# Patient Record
Sex: FEMALE | Race: WHITE | NOT HISPANIC OR LATINO
[De-identification: names, ages, dates, MRNs, and addresses within clinical notes are randomized per-mention and may not be internally consistent; named-entity substitution may affect disease eponyms.]

---

## 2017-01-25 DIAGNOSIS — M76.892 OTHER SPECIFIED ENTHESOPATHIES OF LEFT LOWER LIMB, EXCLUDING FOOT: ICD-10-CM

## 2017-01-25 DIAGNOSIS — M25.552 PAIN IN LEFT HIP: ICD-10-CM

## 2017-02-02 ENCOUNTER — FORM ENCOUNTER (OUTPATIENT)
Age: 67
End: 2017-02-02

## 2017-02-03 ENCOUNTER — APPOINTMENT (OUTPATIENT)
Dept: RADIOLOGY | Facility: CLINIC | Age: 67
End: 2017-02-03

## 2017-02-03 ENCOUNTER — OUTPATIENT (OUTPATIENT)
Dept: OUTPATIENT SERVICES | Facility: HOSPITAL | Age: 67
LOS: 1 days | End: 2017-02-03
Payer: MEDICARE

## 2017-02-03 ENCOUNTER — APPOINTMENT (OUTPATIENT)
Dept: MRI IMAGING | Facility: CLINIC | Age: 67
End: 2017-02-03

## 2017-02-03 DIAGNOSIS — N84.0 POLYP OF CORPUS UTERI: Chronic | ICD-10-CM

## 2017-02-03 DIAGNOSIS — Z96.641 PRESENCE OF RIGHT ARTIFICIAL HIP JOINT: Chronic | ICD-10-CM

## 2017-02-03 DIAGNOSIS — Z98.89 OTHER SPECIFIED POSTPROCEDURAL STATES: Chronic | ICD-10-CM

## 2017-02-03 DIAGNOSIS — Z00.8 ENCOUNTER FOR OTHER GENERAL EXAMINATION: ICD-10-CM

## 2017-02-03 PROCEDURE — 73721 MRI JNT OF LWR EXTRE W/O DYE: CPT

## 2017-04-05 ENCOUNTER — CLINICAL ADVICE (OUTPATIENT)
Age: 67
End: 2017-04-05

## 2017-04-11 ENCOUNTER — APPOINTMENT (OUTPATIENT)
Dept: ORTHOPEDIC SURGERY | Facility: CLINIC | Age: 67
End: 2017-04-11

## 2017-04-11 VITALS
DIASTOLIC BLOOD PRESSURE: 83 MMHG | BODY MASS INDEX: 25.76 KG/M2 | HEIGHT: 68 IN | WEIGHT: 170 LBS | HEART RATE: 86 BPM | SYSTOLIC BLOOD PRESSURE: 143 MMHG

## 2017-04-20 RX ADMIN — Medication 0 MG/3ML: at 00:00

## 2017-05-01 RX ORDER — HYALURONATE SOD, CROSS-LINKED 30 MG/3 ML
30 SYRINGE (ML) INTRAARTICULAR
Refills: 0 | Status: COMPLETED | OUTPATIENT
Start: 2017-04-20

## 2017-07-25 ENCOUNTER — RESULT REVIEW (OUTPATIENT)
Age: 67
End: 2017-07-25

## 2017-11-07 ENCOUNTER — APPOINTMENT (OUTPATIENT)
Dept: ORTHOPEDIC SURGERY | Facility: CLINIC | Age: 67
End: 2017-11-07
Payer: MEDICARE

## 2017-11-07 VITALS
DIASTOLIC BLOOD PRESSURE: 84 MMHG | SYSTOLIC BLOOD PRESSURE: 130 MMHG | HEIGHT: 68 IN | WEIGHT: 173.8 LBS | BODY MASS INDEX: 26.34 KG/M2 | HEART RATE: 85 BPM

## 2017-11-07 DIAGNOSIS — T84.84XA PAIN DUE TO INTERNAL ORTHOPEDIC PROSTHETIC DEVICES, IMPLANTS AND GRAFTS, INITIAL ENCOUNTER: ICD-10-CM

## 2017-11-07 DIAGNOSIS — Z96.649 PAIN DUE TO INTERNAL ORTHOPEDIC PROSTHETIC DEVICES, IMPLANTS AND GRAFTS, INITIAL ENCOUNTER: ICD-10-CM

## 2017-11-07 PROCEDURE — 20610 DRAIN/INJ JOINT/BURSA W/O US: CPT | Mod: RT

## 2017-11-07 PROCEDURE — 73502 X-RAY EXAM HIP UNI 2-3 VIEWS: CPT

## 2017-11-07 PROCEDURE — 99213 OFFICE O/P EST LOW 20 MIN: CPT | Mod: 25

## 2017-11-07 PROCEDURE — 73562 X-RAY EXAM OF KNEE 3: CPT | Mod: RT

## 2018-01-17 ENCOUNTER — TRANSCRIPTION ENCOUNTER (OUTPATIENT)
Age: 68
End: 2018-01-17

## 2018-01-20 ENCOUNTER — APPOINTMENT (OUTPATIENT)
Dept: ORTHOPEDIC SURGERY | Facility: CLINIC | Age: 68
End: 2018-01-20
Payer: MEDICARE

## 2018-01-20 VITALS
DIASTOLIC BLOOD PRESSURE: 84 MMHG | HEART RATE: 75 BPM | WEIGHT: 173 LBS | SYSTOLIC BLOOD PRESSURE: 137 MMHG | HEIGHT: 68 IN | BODY MASS INDEX: 26.22 KG/M2

## 2018-01-20 PROCEDURE — 20610 DRAIN/INJ JOINT/BURSA W/O US: CPT | Mod: RT

## 2018-01-20 PROCEDURE — 99214 OFFICE O/P EST MOD 30 MIN: CPT | Mod: 25

## 2018-01-20 RX ADMIN — Medication 0 MG/3ML: at 00:00

## 2018-01-26 RX ORDER — HYALURONATE SOD, CROSS-LINKED 30 MG/3 ML
30 SYRINGE (ML) INTRAARTICULAR
Qty: 0 | Refills: 0 | Status: COMPLETED | OUTPATIENT
Start: 2018-01-20

## 2018-07-20 ENCOUNTER — APPOINTMENT (OUTPATIENT)
Dept: ORTHOPEDIC SURGERY | Facility: CLINIC | Age: 68
End: 2018-07-20
Payer: MEDICARE

## 2018-07-20 VITALS
SYSTOLIC BLOOD PRESSURE: 121 MMHG | HEIGHT: 68 IN | DIASTOLIC BLOOD PRESSURE: 76 MMHG | BODY MASS INDEX: 26.22 KG/M2 | HEART RATE: 71 BPM | WEIGHT: 173 LBS

## 2018-07-20 PROCEDURE — 20610 DRAIN/INJ JOINT/BURSA W/O US: CPT | Mod: 50

## 2018-07-20 PROCEDURE — 99214 OFFICE O/P EST MOD 30 MIN: CPT | Mod: 25

## 2018-07-26 ENCOUNTER — RESULT REVIEW (OUTPATIENT)
Age: 68
End: 2018-07-26

## 2018-11-27 ENCOUNTER — APPOINTMENT (OUTPATIENT)
Dept: ORTHOPEDIC SURGERY | Facility: CLINIC | Age: 68
End: 2018-11-27
Payer: MEDICARE

## 2018-11-27 VITALS
HEART RATE: 91 BPM | DIASTOLIC BLOOD PRESSURE: 89 MMHG | BODY MASS INDEX: 23.57 KG/M2 | WEIGHT: 155 LBS | SYSTOLIC BLOOD PRESSURE: 142 MMHG

## 2018-11-27 PROCEDURE — 99213 OFFICE O/P EST LOW 20 MIN: CPT

## 2018-11-27 PROCEDURE — 73502 X-RAY EXAM HIP UNI 2-3 VIEWS: CPT

## 2019-01-31 ENCOUNTER — APPOINTMENT (OUTPATIENT)
Dept: ORTHOPEDIC SURGERY | Facility: CLINIC | Age: 69
End: 2019-01-31

## 2019-05-07 ENCOUNTER — APPOINTMENT (OUTPATIENT)
Dept: ORTHOPEDIC SURGERY | Facility: CLINIC | Age: 69
End: 2019-05-07
Payer: MEDICARE

## 2019-05-07 VITALS — DIASTOLIC BLOOD PRESSURE: 84 MMHG | HEART RATE: 79 BPM | SYSTOLIC BLOOD PRESSURE: 125 MMHG

## 2019-05-07 PROCEDURE — 99213 OFFICE O/P EST LOW 20 MIN: CPT

## 2019-05-07 PROCEDURE — 73502 X-RAY EXAM HIP UNI 2-3 VIEWS: CPT

## 2019-05-07 PROCEDURE — 73562 X-RAY EXAM OF KNEE 3: CPT | Mod: LT

## 2019-05-08 ENCOUNTER — FORM ENCOUNTER (OUTPATIENT)
Age: 69
End: 2019-05-08

## 2019-05-08 LAB
COBALT: NORMAL UG/L
NICKEL: NORMAL UG/L

## 2019-05-09 ENCOUNTER — OUTPATIENT (OUTPATIENT)
Dept: OUTPATIENT SERVICES | Facility: HOSPITAL | Age: 69
LOS: 1 days | End: 2019-05-09
Payer: MEDICARE

## 2019-05-09 ENCOUNTER — APPOINTMENT (OUTPATIENT)
Dept: MRI IMAGING | Facility: CLINIC | Age: 69
End: 2019-05-09
Payer: MEDICARE

## 2019-05-09 DIAGNOSIS — Z98.89 OTHER SPECIFIED POSTPROCEDURAL STATES: Chronic | ICD-10-CM

## 2019-05-09 DIAGNOSIS — Z96.641 PRESENCE OF RIGHT ARTIFICIAL HIP JOINT: Chronic | ICD-10-CM

## 2019-05-09 DIAGNOSIS — Z00.8 ENCOUNTER FOR OTHER GENERAL EXAMINATION: ICD-10-CM

## 2019-05-09 DIAGNOSIS — N84.0 POLYP OF CORPUS UTERI: Chronic | ICD-10-CM

## 2019-05-09 LAB — CR BLD-MCNC: 1.1 UG/L

## 2019-05-09 PROCEDURE — 73721 MRI JNT OF LWR EXTRE W/O DYE: CPT | Mod: 26,RT

## 2019-05-09 PROCEDURE — 73721 MRI JNT OF LWR EXTRE W/O DYE: CPT

## 2019-05-14 ENCOUNTER — FORM ENCOUNTER (OUTPATIENT)
Age: 69
End: 2019-05-14

## 2019-05-14 ENCOUNTER — APPOINTMENT (OUTPATIENT)
Dept: ORTHOPEDIC SURGERY | Facility: CLINIC | Age: 69
End: 2019-05-14
Payer: MEDICARE

## 2019-05-14 VITALS — DIASTOLIC BLOOD PRESSURE: 81 MMHG | SYSTOLIC BLOOD PRESSURE: 126 MMHG | HEART RATE: 72 BPM

## 2019-05-14 DIAGNOSIS — M70.61 TROCHANTERIC BURSITIS, RIGHT HIP: ICD-10-CM

## 2019-05-14 PROCEDURE — 99213 OFFICE O/P EST LOW 20 MIN: CPT

## 2019-05-15 ENCOUNTER — APPOINTMENT (OUTPATIENT)
Dept: ULTRASOUND IMAGING | Facility: CLINIC | Age: 69
End: 2019-05-15
Payer: MEDICARE

## 2019-05-15 ENCOUNTER — OUTPATIENT (OUTPATIENT)
Dept: OUTPATIENT SERVICES | Facility: HOSPITAL | Age: 69
LOS: 1 days | End: 2019-05-15
Payer: MEDICARE

## 2019-05-15 DIAGNOSIS — Z98.89 OTHER SPECIFIED POSTPROCEDURAL STATES: Chronic | ICD-10-CM

## 2019-05-15 DIAGNOSIS — N84.0 POLYP OF CORPUS UTERI: Chronic | ICD-10-CM

## 2019-05-15 DIAGNOSIS — Z96.641 PRESENCE OF RIGHT ARTIFICIAL HIP JOINT: Chronic | ICD-10-CM

## 2019-05-15 DIAGNOSIS — Z00.8 ENCOUNTER FOR OTHER GENERAL EXAMINATION: ICD-10-CM

## 2019-05-15 PROCEDURE — 20611 DRAIN/INJ JOINT/BURSA W/US: CPT

## 2019-05-15 PROCEDURE — 20611 DRAIN/INJ JOINT/BURSA W/US: CPT | Mod: RT

## 2019-05-21 ENCOUNTER — APPOINTMENT (OUTPATIENT)
Dept: ORTHOPEDIC SURGERY | Facility: CLINIC | Age: 69
End: 2019-05-21

## 2019-06-12 ENCOUNTER — APPOINTMENT (OUTPATIENT)
Dept: ORTHOPEDIC SURGERY | Facility: CLINIC | Age: 69
End: 2019-06-12

## 2019-06-30 ENCOUNTER — INPATIENT (INPATIENT)
Facility: HOSPITAL | Age: 69
LOS: 4 days | Discharge: HOME | DRG: 392 | End: 2019-07-05
Attending: INTERNAL MEDICINE | Admitting: INTERNAL MEDICINE
Payer: MEDICARE

## 2019-06-30 VITALS
SYSTOLIC BLOOD PRESSURE: 131 MMHG | WEIGHT: 160.06 LBS | RESPIRATION RATE: 20 BRPM | TEMPERATURE: 98 F | HEIGHT: 68 IN | HEART RATE: 91 BPM | OXYGEN SATURATION: 99 % | DIASTOLIC BLOOD PRESSURE: 71 MMHG

## 2019-06-30 DIAGNOSIS — Z98.89 OTHER SPECIFIED POSTPROCEDURAL STATES: Chronic | ICD-10-CM

## 2019-06-30 DIAGNOSIS — Z96.641 PRESENCE OF RIGHT ARTIFICIAL HIP JOINT: Chronic | ICD-10-CM

## 2019-06-30 DIAGNOSIS — N84.0 POLYP OF CORPUS UTERI: Chronic | ICD-10-CM

## 2019-06-30 LAB
ALBUMIN SERPL ELPH-MCNC: 2.7 G/DL — LOW (ref 3.3–5)
ALP SERPL-CCNC: 60 U/L — SIGNIFICANT CHANGE UP (ref 40–120)
ALT FLD-CCNC: 35 U/L — SIGNIFICANT CHANGE UP (ref 10–45)
ANION GAP SERPL CALC-SCNC: 15 MMOL/L — SIGNIFICANT CHANGE UP (ref 5–17)
APTT BLD: 20.1 SEC — LOW (ref 27.5–36.3)
AST SERPL-CCNC: 54 U/L — HIGH (ref 10–40)
BASE EXCESS BLDV CALC-SCNC: 3.8 MMOL/L — HIGH (ref -2–2)
BASOPHILS # BLD AUTO: 0 K/UL — SIGNIFICANT CHANGE UP (ref 0–0.2)
BASOPHILS NFR BLD AUTO: 0.1 % — SIGNIFICANT CHANGE UP (ref 0–2)
BILIRUB SERPL-MCNC: 0.6 MG/DL — SIGNIFICANT CHANGE UP (ref 0.2–1.2)
BUN SERPL-MCNC: 12 MG/DL — SIGNIFICANT CHANGE UP (ref 7–23)
CA-I SERPL-SCNC: 1.15 MMOL/L — SIGNIFICANT CHANGE UP (ref 1.12–1.3)
CALCIUM SERPL-MCNC: 9.1 MG/DL — SIGNIFICANT CHANGE UP (ref 8.4–10.5)
CHLORIDE BLDV-SCNC: 101 MMOL/L — SIGNIFICANT CHANGE UP (ref 96–108)
CHLORIDE SERPL-SCNC: 92 MMOL/L — LOW (ref 96–108)
CO2 BLDV-SCNC: 30 MMOL/L — SIGNIFICANT CHANGE UP (ref 22–30)
CO2 SERPL-SCNC: 23 MMOL/L — SIGNIFICANT CHANGE UP (ref 22–31)
CREAT SERPL-MCNC: 0.54 MG/DL — SIGNIFICANT CHANGE UP (ref 0.5–1.3)
EOSINOPHIL # BLD AUTO: 0.4 K/UL — SIGNIFICANT CHANGE UP (ref 0–0.5)
EOSINOPHIL NFR BLD AUTO: 3.6 % — SIGNIFICANT CHANGE UP (ref 0–6)
GAS PNL BLDV: 132 MMOL/L — LOW (ref 135–145)
GAS PNL BLDV: SIGNIFICANT CHANGE UP
GLUCOSE BLDV-MCNC: 127 MG/DL — HIGH (ref 70–99)
GLUCOSE SERPL-MCNC: 129 MG/DL — HIGH (ref 70–99)
HCO3 BLDV-SCNC: 28 MMOL/L — SIGNIFICANT CHANGE UP (ref 21–29)
HCT VFR BLD CALC: 40.4 % — SIGNIFICANT CHANGE UP (ref 34.5–45)
HCT VFR BLDA CALC: 41 % — SIGNIFICANT CHANGE UP (ref 39–50)
HGB BLD CALC-MCNC: 13.5 G/DL — SIGNIFICANT CHANGE UP (ref 11.5–15.5)
HGB BLD-MCNC: 13.8 G/DL — SIGNIFICANT CHANGE UP (ref 11.5–15.5)
INR BLD: 1.19 RATIO — HIGH (ref 0.88–1.16)
LACTATE BLDV-MCNC: 1.4 MMOL/L — SIGNIFICANT CHANGE UP (ref 0.7–2)
LIDOCAIN IGE QN: 17 U/L — SIGNIFICANT CHANGE UP (ref 7–60)
LYMPHOCYTES # BLD AUTO: 0.6 K/UL — LOW (ref 1–3.3)
LYMPHOCYTES # BLD AUTO: 5.9 % — LOW (ref 13–44)
MAGNESIUM SERPL-MCNC: 2 MG/DL — SIGNIFICANT CHANGE UP (ref 1.6–2.6)
MCHC RBC-ENTMCNC: 33.3 PG — SIGNIFICANT CHANGE UP (ref 27–34)
MCHC RBC-ENTMCNC: 34.1 GM/DL — SIGNIFICANT CHANGE UP (ref 32–36)
MCV RBC AUTO: 97.6 FL — SIGNIFICANT CHANGE UP (ref 80–100)
MONOCYTES # BLD AUTO: 0.9 K/UL — SIGNIFICANT CHANGE UP (ref 0–0.9)
MONOCYTES NFR BLD AUTO: 8.6 % — SIGNIFICANT CHANGE UP (ref 2–14)
NEUTROPHILS # BLD AUTO: 8.8 K/UL — HIGH (ref 1.8–7.4)
NEUTROPHILS NFR BLD AUTO: 81.9 % — HIGH (ref 43–77)
PCO2 BLDV: 44 MMHG — SIGNIFICANT CHANGE UP (ref 35–50)
PH BLDV: 7.42 — SIGNIFICANT CHANGE UP (ref 7.35–7.45)
PLATELET # BLD AUTO: 294 K/UL — SIGNIFICANT CHANGE UP (ref 150–400)
PO2 BLDV: 41 MMHG — SIGNIFICANT CHANGE UP (ref 25–45)
POTASSIUM BLDV-SCNC: 3.4 MMOL/L — LOW (ref 3.5–5.3)
POTASSIUM SERPL-MCNC: 5.9 MMOL/L — HIGH (ref 3.5–5.3)
POTASSIUM SERPL-SCNC: 5.9 MMOL/L — HIGH (ref 3.5–5.3)
PROT SERPL-MCNC: 7.8 G/DL — SIGNIFICANT CHANGE UP (ref 6–8.3)
PROTHROM AB SERPL-ACNC: 13.6 SEC — HIGH (ref 10–12.9)
RBC # BLD: 4.13 M/UL — SIGNIFICANT CHANGE UP (ref 3.8–5.2)
RBC # FLD: 11.7 % — SIGNIFICANT CHANGE UP (ref 10.3–14.5)
SAO2 % BLDV: 70 % — SIGNIFICANT CHANGE UP (ref 67–88)
SODIUM SERPL-SCNC: 130 MMOL/L — LOW (ref 135–145)
WBC # BLD: 10.7 K/UL — HIGH (ref 3.8–10.5)
WBC # FLD AUTO: 10.7 K/UL — HIGH (ref 3.8–10.5)

## 2019-06-30 PROCEDURE — 74177 CT ABD & PELVIS W/CONTRAST: CPT | Mod: 26

## 2019-06-30 PROCEDURE — 99284 EMERGENCY DEPT VISIT MOD MDM: CPT | Mod: GC

## 2019-06-30 RX ORDER — ONDANSETRON 8 MG/1
4 TABLET, FILM COATED ORAL ONCE
Refills: 0 | Status: COMPLETED | OUTPATIENT
Start: 2019-06-30 | End: 2019-06-30

## 2019-06-30 RX ORDER — ACETAMINOPHEN 500 MG
650 TABLET ORAL EVERY 6 HOURS
Refills: 0 | Status: DISCONTINUED | OUTPATIENT
Start: 2019-06-30 | End: 2019-06-30

## 2019-06-30 RX ORDER — SODIUM CHLORIDE 9 MG/ML
1000 INJECTION, SOLUTION INTRAVENOUS ONCE
Refills: 0 | Status: COMPLETED | OUTPATIENT
Start: 2019-06-30 | End: 2019-06-30

## 2019-06-30 RX ADMIN — ONDANSETRON 4 MILLIGRAM(S): 8 TABLET, FILM COATED ORAL at 22:45

## 2019-06-30 RX ADMIN — SODIUM CHLORIDE 1000 MILLILITER(S): 9 INJECTION, SOLUTION INTRAVENOUS at 22:37

## 2019-06-30 RX ADMIN — Medication 650 MILLIGRAM(S): at 22:45

## 2019-06-30 NOTE — ED ADULT NURSE NOTE - ED STAT RN HANDOFF DETAILS 2
Report received from Alis Cerrato RN. Pt TBA med. No bed yet. A&Ox4. IV intact x 2 RACF and LACF without sx of infilt. Heparin infusing well via pump. Color pink. skin W&D. lungs clear. Abd distended TTP upper abd bilat. c/o upper bilat abd pain 4/10. voiding well 0700Nursing Reassesment: Report received from Alis Cerrato RN. Pt TBA med. No bed yet. A&Ox4. IV intact x 2 RACF and LACF without sx of infilt. Heparin infusing well via pump. Color pink. skin W&D. lungs clear. Abd distended TTP upper abd bilat. c/o upper bilat abd pain 4/10. voiding well.  0840 c/o increasing abd pain, lower back pain and nausea. PA Ladece notified. 0910 Pt medicated for pain and nausea.  0915 PMD Dr León salgado pt.  0920 Awaiting transport to [address] [place] [address]. Feeling a little better.

## 2019-06-30 NOTE — ED ADULT NURSE NOTE - OBJECTIVE STATEMENT
68y female with hx of HLD, factor V and OA presents to the ER c/o abdominal pain x2 days. pt is alert and oriented x 4 and speaking coherently. pt states that last monday she began to have fevers, chills, nausea pt called her PCP who wanted her to have an abdominal CT. Pt states she was supposed to go to the Dr tomorrow for the CT but the pain and abdominal swelling worsened tonight. Pt denies cp, sob. pt abdomen is distended. pt passing gas and BM. Pt c/o generalized abdominal pain. MD salgado completed. will reassess.

## 2019-07-01 DIAGNOSIS — R10.9 UNSPECIFIED ABDOMINAL PAIN: ICD-10-CM

## 2019-07-01 LAB
ALBUMIN SERPL ELPH-MCNC: 2.4 G/DL — LOW (ref 3.3–5)
ALBUMIN SERPL ELPH-MCNC: 2.5 G/DL — LOW (ref 3.3–5)
ALP SERPL-CCNC: 47 U/L — SIGNIFICANT CHANGE UP (ref 40–120)
ALP SERPL-CCNC: 49 U/L — SIGNIFICANT CHANGE UP (ref 40–120)
ALT FLD-CCNC: 19 U/L — SIGNIFICANT CHANGE UP (ref 10–45)
ALT FLD-CCNC: 28 U/L — SIGNIFICANT CHANGE UP (ref 10–45)
ANION GAP SERPL CALC-SCNC: 10 MMOL/L — SIGNIFICANT CHANGE UP (ref 5–17)
ANION GAP SERPL CALC-SCNC: 14 MMOL/L — SIGNIFICANT CHANGE UP (ref 5–17)
APPEARANCE UR: CLEAR — SIGNIFICANT CHANGE UP
APPEARANCE UR: CLEAR — SIGNIFICANT CHANGE UP
APTT BLD: 40.6 SEC — HIGH (ref 27.5–36.3)
APTT BLD: 54.9 SEC — HIGH (ref 27.5–36.3)
AST SERPL-CCNC: 19 U/L — SIGNIFICANT CHANGE UP (ref 10–40)
AST SERPL-CCNC: 29 U/L — SIGNIFICANT CHANGE UP (ref 10–40)
BACTERIA # UR AUTO: NEGATIVE — SIGNIFICANT CHANGE UP
BILIRUB SERPL-MCNC: 0.3 MG/DL — SIGNIFICANT CHANGE UP (ref 0.2–1.2)
BILIRUB SERPL-MCNC: 0.4 MG/DL — SIGNIFICANT CHANGE UP (ref 0.2–1.2)
BILIRUB UR-MCNC: NEGATIVE — SIGNIFICANT CHANGE UP
BILIRUB UR-MCNC: NEGATIVE — SIGNIFICANT CHANGE UP
BLD GP AB SCN SERPL QL: NEGATIVE — SIGNIFICANT CHANGE UP
BUN SERPL-MCNC: 11 MG/DL — SIGNIFICANT CHANGE UP (ref 7–23)
BUN SERPL-MCNC: 8 MG/DL — SIGNIFICANT CHANGE UP (ref 7–23)
CALCIUM SERPL-MCNC: 8.6 MG/DL — SIGNIFICANT CHANGE UP (ref 8.4–10.5)
CALCIUM SERPL-MCNC: 8.8 MG/DL — SIGNIFICANT CHANGE UP (ref 8.4–10.5)
CHLORIDE SERPL-SCNC: 101 MMOL/L — SIGNIFICANT CHANGE UP (ref 96–108)
CHLORIDE SERPL-SCNC: 93 MMOL/L — LOW (ref 96–108)
CO2 SERPL-SCNC: 24 MMOL/L — SIGNIFICANT CHANGE UP (ref 22–31)
CO2 SERPL-SCNC: 25 MMOL/L — SIGNIFICANT CHANGE UP (ref 22–31)
COLOR SPEC: SIGNIFICANT CHANGE UP
COLOR SPEC: SIGNIFICANT CHANGE UP
CREAT SERPL-MCNC: 0.64 MG/DL — SIGNIFICANT CHANGE UP (ref 0.5–1.3)
CREAT SERPL-MCNC: 0.67 MG/DL — SIGNIFICANT CHANGE UP (ref 0.5–1.3)
DIFF PNL FLD: NEGATIVE — SIGNIFICANT CHANGE UP
DIFF PNL FLD: NEGATIVE — SIGNIFICANT CHANGE UP
EPI CELLS # UR: 1 /HPF — SIGNIFICANT CHANGE UP
GLUCOSE SERPL-MCNC: 136 MG/DL — HIGH (ref 70–99)
GLUCOSE SERPL-MCNC: 147 MG/DL — HIGH (ref 70–99)
GLUCOSE UR QL: NEGATIVE — SIGNIFICANT CHANGE UP
GLUCOSE UR QL: NEGATIVE — SIGNIFICANT CHANGE UP
HCT VFR BLD CALC: 34.2 % — LOW (ref 34.5–45)
HCT VFR BLD CALC: 38.2 % — SIGNIFICANT CHANGE UP (ref 34.5–45)
HGB BLD-MCNC: 11.6 G/DL — SIGNIFICANT CHANGE UP (ref 11.5–15.5)
HGB BLD-MCNC: 12.6 G/DL — SIGNIFICANT CHANGE UP (ref 11.5–15.5)
HYALINE CASTS # UR AUTO: 0 /LPF — SIGNIFICANT CHANGE UP (ref 0–2)
KETONES UR-MCNC: ABNORMAL
KETONES UR-MCNC: ABNORMAL
LACTATE SERPL-SCNC: 2.1 MMOL/L — HIGH (ref 0.7–2)
LEUKOCYTE ESTERASE UR-ACNC: NEGATIVE — SIGNIFICANT CHANGE UP
LEUKOCYTE ESTERASE UR-ACNC: NEGATIVE — SIGNIFICANT CHANGE UP
MCHC RBC-ENTMCNC: 32.5 PG — SIGNIFICANT CHANGE UP (ref 27–34)
MCHC RBC-ENTMCNC: 33 PG — SIGNIFICANT CHANGE UP (ref 27–34)
MCHC RBC-ENTMCNC: 33.1 GM/DL — SIGNIFICANT CHANGE UP (ref 32–36)
MCHC RBC-ENTMCNC: 34 GM/DL — SIGNIFICANT CHANGE UP (ref 32–36)
MCV RBC AUTO: 97.1 FL — SIGNIFICANT CHANGE UP (ref 80–100)
MCV RBC AUTO: 98.1 FL — SIGNIFICANT CHANGE UP (ref 80–100)
NITRITE UR-MCNC: NEGATIVE — SIGNIFICANT CHANGE UP
NITRITE UR-MCNC: NEGATIVE — SIGNIFICANT CHANGE UP
PH UR: 6.5 — SIGNIFICANT CHANGE UP (ref 5–8)
PH UR: 6.5 — SIGNIFICANT CHANGE UP (ref 5–8)
PLATELET # BLD AUTO: 282 K/UL — SIGNIFICANT CHANGE UP (ref 150–400)
PLATELET # BLD AUTO: 327 K/UL — SIGNIFICANT CHANGE UP (ref 150–400)
POTASSIUM SERPL-MCNC: 3.6 MMOL/L — SIGNIFICANT CHANGE UP (ref 3.5–5.3)
POTASSIUM SERPL-MCNC: 4.1 MMOL/L — SIGNIFICANT CHANGE UP (ref 3.5–5.3)
POTASSIUM SERPL-SCNC: 3.6 MMOL/L — SIGNIFICANT CHANGE UP (ref 3.5–5.3)
POTASSIUM SERPL-SCNC: 4.1 MMOL/L — SIGNIFICANT CHANGE UP (ref 3.5–5.3)
PROT SERPL-MCNC: 5.8 G/DL — LOW (ref 6–8.3)
PROT SERPL-MCNC: 6.1 G/DL — SIGNIFICANT CHANGE UP (ref 6–8.3)
PROT UR-MCNC: SIGNIFICANT CHANGE UP
PROT UR-MCNC: SIGNIFICANT CHANGE UP
RBC # BLD: 3.52 M/UL — LOW (ref 3.8–5.2)
RBC # BLD: 3.89 M/UL — SIGNIFICANT CHANGE UP (ref 3.8–5.2)
RBC # FLD: 11.4 % — SIGNIFICANT CHANGE UP (ref 10.3–14.5)
RBC # FLD: 11.7 % — SIGNIFICANT CHANGE UP (ref 10.3–14.5)
RBC CASTS # UR COMP ASSIST: 1 /HPF — SIGNIFICANT CHANGE UP (ref 0–4)
RH IG SCN BLD-IMP: POSITIVE — SIGNIFICANT CHANGE UP
SODIUM SERPL-SCNC: 131 MMOL/L — LOW (ref 135–145)
SODIUM SERPL-SCNC: 136 MMOL/L — SIGNIFICANT CHANGE UP (ref 135–145)
SP GR SPEC: 1.02 — SIGNIFICANT CHANGE UP (ref 1.01–1.02)
SP GR SPEC: 1.04 — HIGH (ref 1.01–1.02)
UROBILINOGEN FLD QL: NEGATIVE — SIGNIFICANT CHANGE UP
UROBILINOGEN FLD QL: SIGNIFICANT CHANGE UP
WBC # BLD: 10 K/UL — SIGNIFICANT CHANGE UP (ref 3.8–10.5)
WBC # BLD: 9.7 K/UL — SIGNIFICANT CHANGE UP (ref 3.8–10.5)
WBC # FLD AUTO: 10 K/UL — SIGNIFICANT CHANGE UP (ref 3.8–10.5)
WBC # FLD AUTO: 9.7 K/UL — SIGNIFICANT CHANGE UP (ref 3.8–10.5)
WBC UR QL: 2 /HPF — SIGNIFICANT CHANGE UP (ref 0–5)

## 2019-07-01 PROCEDURE — 99284 EMERGENCY DEPT VISIT MOD MDM: CPT

## 2019-07-01 PROCEDURE — 76705 ECHO EXAM OF ABDOMEN: CPT | Mod: 26

## 2019-07-01 PROCEDURE — 74019 RADEX ABDOMEN 2 VIEWS: CPT | Mod: 26

## 2019-07-01 RX ORDER — ACETAMINOPHEN 500 MG
650 TABLET ORAL ONCE
Refills: 0 | Status: COMPLETED | OUTPATIENT
Start: 2019-07-01 | End: 2019-07-01

## 2019-07-01 RX ORDER — ONDANSETRON 8 MG/1
4 TABLET, FILM COATED ORAL EVERY 6 HOURS
Refills: 0 | Status: DISCONTINUED | OUTPATIENT
Start: 2019-07-01 | End: 2019-07-05

## 2019-07-01 RX ORDER — ATORVASTATIN CALCIUM 80 MG/1
10 TABLET, FILM COATED ORAL AT BEDTIME
Refills: 0 | Status: DISCONTINUED | OUTPATIENT
Start: 2019-07-01 | End: 2019-07-05

## 2019-07-01 RX ORDER — METRONIDAZOLE 500 MG
500 TABLET ORAL EVERY 8 HOURS
Refills: 0 | Status: DISCONTINUED | OUTPATIENT
Start: 2019-07-01 | End: 2019-07-05

## 2019-07-01 RX ORDER — CEFTRIAXONE 500 MG/1
1000 INJECTION, POWDER, FOR SOLUTION INTRAMUSCULAR; INTRAVENOUS EVERY 24 HOURS
Refills: 0 | Status: DISCONTINUED | OUTPATIENT
Start: 2019-07-02 | End: 2019-07-02

## 2019-07-01 RX ORDER — METRONIDAZOLE 500 MG
500 TABLET ORAL ONCE
Refills: 0 | Status: COMPLETED | OUTPATIENT
Start: 2019-07-01 | End: 2019-07-01

## 2019-07-01 RX ORDER — SODIUM CHLORIDE 9 MG/ML
1000 INJECTION, SOLUTION INTRAVENOUS
Refills: 0 | Status: DISCONTINUED | OUTPATIENT
Start: 2019-07-01 | End: 2019-07-05

## 2019-07-01 RX ORDER — CEFTRIAXONE 500 MG/1
1000 INJECTION, POWDER, FOR SOLUTION INTRAMUSCULAR; INTRAVENOUS ONCE
Refills: 0 | Status: COMPLETED | OUTPATIENT
Start: 2019-07-01 | End: 2019-07-01

## 2019-07-01 RX ORDER — PANTOPRAZOLE SODIUM 20 MG/1
40 TABLET, DELAYED RELEASE ORAL AT BEDTIME
Refills: 0 | Status: DISCONTINUED | OUTPATIENT
Start: 2019-07-01 | End: 2019-07-05

## 2019-07-01 RX ORDER — HEPARIN SODIUM 5000 [USP'U]/ML
6000 INJECTION INTRAVENOUS; SUBCUTANEOUS ONCE
Refills: 0 | Status: COMPLETED | OUTPATIENT
Start: 2019-07-01 | End: 2019-07-01

## 2019-07-01 RX ORDER — HEPARIN SODIUM 5000 [USP'U]/ML
3000 INJECTION INTRAVENOUS; SUBCUTANEOUS EVERY 6 HOURS
Refills: 0 | Status: DISCONTINUED | OUTPATIENT
Start: 2019-07-01 | End: 2019-07-02

## 2019-07-01 RX ORDER — ACETAMINOPHEN 500 MG
1000 TABLET ORAL ONCE
Refills: 0 | Status: COMPLETED | OUTPATIENT
Start: 2019-07-01 | End: 2019-07-01

## 2019-07-01 RX ORDER — HEPARIN SODIUM 5000 [USP'U]/ML
6000 INJECTION INTRAVENOUS; SUBCUTANEOUS EVERY 6 HOURS
Refills: 0 | Status: DISCONTINUED | OUTPATIENT
Start: 2019-07-01 | End: 2019-07-02

## 2019-07-01 RX ORDER — TRAMADOL HYDROCHLORIDE 50 MG/1
50 TABLET ORAL EVERY 8 HOURS
Refills: 0 | Status: DISCONTINUED | OUTPATIENT
Start: 2019-07-01 | End: 2019-07-01

## 2019-07-01 RX ORDER — HEPARIN SODIUM 5000 [USP'U]/ML
INJECTION INTRAVENOUS; SUBCUTANEOUS
Qty: 25000 | Refills: 0 | Status: DISCONTINUED | OUTPATIENT
Start: 2019-07-01 | End: 2019-07-02

## 2019-07-01 RX ORDER — METRONIDAZOLE 500 MG
TABLET ORAL
Refills: 0 | Status: DISCONTINUED | OUTPATIENT
Start: 2019-07-01 | End: 2019-07-05

## 2019-07-01 RX ORDER — SODIUM CHLORIDE 9 MG/ML
1000 INJECTION, SOLUTION INTRAVENOUS ONCE
Refills: 0 | Status: COMPLETED | OUTPATIENT
Start: 2019-07-01 | End: 2019-07-01

## 2019-07-01 RX ADMIN — ONDANSETRON 4 MILLIGRAM(S): 8 TABLET, FILM COATED ORAL at 09:20

## 2019-07-01 RX ADMIN — HEPARIN SODIUM 3000 UNIT(S): 5000 INJECTION INTRAVENOUS; SUBCUTANEOUS at 10:58

## 2019-07-01 RX ADMIN — HEPARIN SODIUM 3000 UNIT(S): 5000 INJECTION INTRAVENOUS; SUBCUTANEOUS at 18:21

## 2019-07-01 RX ADMIN — HEPARIN SODIUM 6000 UNIT(S): 5000 INJECTION INTRAVENOUS; SUBCUTANEOUS at 03:37

## 2019-07-01 RX ADMIN — HEPARIN SODIUM 1500 UNIT(S)/HR: 5000 INJECTION INTRAVENOUS; SUBCUTANEOUS at 10:50

## 2019-07-01 RX ADMIN — SODIUM CHLORIDE 100 MILLILITER(S): 9 INJECTION, SOLUTION INTRAVENOUS at 12:12

## 2019-07-01 RX ADMIN — SODIUM CHLORIDE 1000 MILLILITER(S): 9 INJECTION, SOLUTION INTRAVENOUS at 03:44

## 2019-07-01 RX ADMIN — TRAMADOL HYDROCHLORIDE 50 MILLIGRAM(S): 50 TABLET ORAL at 12:11

## 2019-07-01 RX ADMIN — HEPARIN SODIUM 1300 UNIT(S)/HR: 5000 INJECTION INTRAVENOUS; SUBCUTANEOUS at 03:38

## 2019-07-01 RX ADMIN — TRAMADOL HYDROCHLORIDE 50 MILLIGRAM(S): 50 TABLET ORAL at 13:11

## 2019-07-01 RX ADMIN — ATORVASTATIN CALCIUM 10 MILLIGRAM(S): 80 TABLET, FILM COATED ORAL at 22:44

## 2019-07-01 RX ADMIN — Medication 650 MILLIGRAM(S): at 06:25

## 2019-07-01 RX ADMIN — Medication 100 MILLIGRAM(S): at 02:25

## 2019-07-01 RX ADMIN — Medication 10 MILLIGRAM(S): at 18:21

## 2019-07-01 RX ADMIN — Medication 100 MILLIGRAM(S): at 10:58

## 2019-07-01 RX ADMIN — SODIUM CHLORIDE 1000 MILLILITER(S): 9 INJECTION, SOLUTION INTRAVENOUS at 03:15

## 2019-07-01 RX ADMIN — HEPARIN SODIUM 1700 UNIT(S)/HR: 5000 INJECTION INTRAVENOUS; SUBCUTANEOUS at 18:17

## 2019-07-01 RX ADMIN — Medication 650 MILLIGRAM(S): at 03:46

## 2019-07-01 RX ADMIN — PANTOPRAZOLE SODIUM 40 MILLIGRAM(S): 20 TABLET, DELAYED RELEASE ORAL at 22:44

## 2019-07-01 RX ADMIN — ONDANSETRON 4 MILLIGRAM(S): 8 TABLET, FILM COATED ORAL at 18:17

## 2019-07-01 RX ADMIN — Medication 1000 MILLIGRAM(S): at 10:21

## 2019-07-01 RX ADMIN — CEFTRIAXONE 100 MILLIGRAM(S): 500 INJECTION, POWDER, FOR SOLUTION INTRAMUSCULAR; INTRAVENOUS at 01:17

## 2019-07-01 RX ADMIN — Medication 400 MILLIGRAM(S): at 09:20

## 2019-07-01 RX ADMIN — ONDANSETRON 4 MILLIGRAM(S): 8 TABLET, FILM COATED ORAL at 12:12

## 2019-07-01 RX ADMIN — Medication 650 MILLIGRAM(S): at 03:13

## 2019-07-01 RX ADMIN — Medication 100 MILLIGRAM(S): at 18:17

## 2019-07-01 RX ADMIN — CEFTRIAXONE 1000 MILLIGRAM(S): 500 INJECTION, POWDER, FOR SOLUTION INTRAMUSCULAR; INTRAVENOUS at 03:15

## 2019-07-01 NOTE — CHART NOTE - NSCHARTNOTEFT_GEN_A_CORE
GENERAL SURGERY ABDOMINAL EXAM:       Patient examined at bedside in ED. Denies nausea/vomiting, last time passed flatus/BM was 6/29 evening    Vital Signs Last 24 Hrs  T(C): 36.9 (01 Jul 2019 10:21), Max: 36.9 (01 Jul 2019 10:21)  T(F): 98.4 (01 Jul 2019 10:21), Max: 98.4 (01 Jul 2019 10:21)  HR: 78 (01 Jul 2019 10:21) (78 - 91)  BP: 109/72 (01 Jul 2019 10:21) (109/72 - 136/78)  BP(mean): --  RR: 18 (01 Jul 2019 10:21) (16 - 20)  SpO2: 98% (01 Jul 2019 10:21) (98% - 99%)      General: NAD, well-nourished  HEENT: Atraumatic, EOMI  Resp: Breathing comfortably on RA  CV: Normal sinus rhythm  Abd: soft, mildly distended, minimal tenderness in RLQ  Ext: ROMIx4, motor strength intact x 4      Plan:   - Serial abdominal exam  - Anticoagulation  - Antibiotics  - F/u GI plan  - F/u Heme Onc plan    VIKY Mcguire, PGY2  Acute Care Surgery, [id number] GENERAL SURGERY ABDOMINAL EXAM:       Patient examined at bedside in ED. Denies nausea/vomiting, last time passed flatus/BM was 6/29 evening    Vital Signs Last 24 Hrs  T(C): 36.9 (01 Jul 2019 10:21), Max: 36.9 (01 Jul 2019 10:21)  T(F): 98.4 (01 Jul 2019 10:21), Max: 98.4 (01 Jul 2019 10:21)  HR: 78 (01 Jul 2019 10:21) (78 - 91)  BP: 109/72 (01 Jul 2019 10:21) (109/72 - 136/78)  BP(mean): --  RR: 18 (01 Jul 2019 10:21) (16 - 20)  SpO2: 98% (01 Jul 2019 10:21) (98% - 99%)      General: NAD, well-nourished  HEENT: Atraumatic, EOMI  Resp: Breathing comfortably on RA  CV: Normal sinus rhythm  Abd: soft, mildly distended, minimal tenderness in RLQ  Ext: ROMIx4, motor strength intact x 4      Plan:   - Serial abdominal exams  - Anticoagulation  - Antibiotics  - F/u GI plan  - F/u Heme Onc plan    VIKY Mcguire, PGY2  Acute Care Surgery, [id number]

## 2019-07-01 NOTE — CHART NOTE - NSCHARTNOTEFT_GEN_A_CORE
GENERAL SURGERY ABDOMINAL EXAM:       Patient examined at bedside. Denies nausea/vomiting, passed flatus this AM    Vital Signs Last 24 Hrs  T(C): 36.9 (01 Jul 2019 10:21), Max: 36.9 (01 Jul 2019 10:21)  T(F): 98.4 (01 Jul 2019 10:21), Max: 98.4 (01 Jul 2019 10:21)  HR: 78 (01 Jul 2019 10:21) (78 - 91)  BP: 109/72 (01 Jul 2019 10:21) (109/72 - 136/78)  BP(mean): --  RR: 18 (01 Jul 2019 10:21) (16 - 20)  SpO2: 98% (01 Jul 2019 10:21) (98% - 99%)      General: NAD, well-nourished  HEENT: Atraumatic, EOMI  Resp: Breathing comfortably on RA  CV: Normal sinus rhythm  Abd: soft, mildly distended, minimal tenderness in RLQ  Ext: ROMIx4, motor strength intact x 4      Plan:   - Serial abdominal exams  - Anticoagulation  - Antibiotics  - F/u GI plan  - F/u Heme Onc plan    VIKY Mcguire, PGY2  Acute Care Surgery, [id number] GENERAL SURGERY ABDOMINAL EXAM:       Patient examined at bedside. Denies nausea/vomiting, passed flatus this AM    Vital Signs Last 24 Hrs  T(C): 36.9 (01 Jul 2019 10:21), Max: 36.9 (01 Jul 2019 10:21)  T(F): 98.4 (01 Jul 2019 10:21), Max: 98.4 (01 Jul 2019 10:21)  HR: 78 (01 Jul 2019 10:21) (78 - 91)  BP: 109/72 (01 Jul 2019 10:21) (109/72 - 136/78)  BP(mean): --  RR: 18 (01 Jul 2019 10:21) (16 - 20)  SpO2: 98% (01 Jul 2019 10:21) (98% - 99%)      General: NAD, well-nourished  HEENT: Atraumatic, EOMI  Resp: Breathing comfortably on RA  CV: Normal sinus rhythm  Abd: soft, mildly distended, minimal tenderness in RLQ  Ext: ROMIx4, motor strength intact x 4      Plan:   - Serial abdominal exams  - Anticoagulation  - Antibiotics  - F/u GI plan  - F/u Heme Onc plan    VIKY Mcguire, PGY2  Acute Care Surgery, [id number]    Surgery Attending   Patient seen and examine on AM rounds with house staff  Pain improving  No peritoneal signs  No signs of uncontrolled sepsis  No indications for acute surgical treatment at this time

## 2019-07-01 NOTE — ED PROVIDER NOTE - ATTENDING CONTRIBUTION TO CARE
Attending MD Malgorzata Carlson:  I personally have seen and examined this patient.  Resident note reviewed and agree on plan of care and except where noted.  See HPI, PE, and MDM for details.

## 2019-07-01 NOTE — ED PROVIDER NOTE - CLINICAL SUMMARY MEDICAL DECISION MAKING FREE TEXT BOX
68y female with diffuse abdominal pain x1 week. Will get labs, ct ab/pel. 68y female with diffuse abdominal pain x1 week. Will get labs, ct ab/pel.  Attending Malgorzata Carlson: 69 y/o female presenting with worsening abdominal pain. on exam abd distended with diffuse ttp. pocus shows evidence of free fluid. no trauma or injury. will obtain ct abd/pelv to further evluate, for possible obstruction vs colitis, vs diverticulitis, labs, and likely admit.

## 2019-07-01 NOTE — CONSULT NOTE ADULT - ATTENDING COMMENTS
Patient seen and examined.  Agree with above NP note.  patient with hx of factor 5 leiden def adm with abdominal pain, ct imaging concerning for bowel ischemia  care per surgery  ivx abx, npo  heme f/u and a/c for factor 5 le deficiency  no concern for acs, no chf  med f/u

## 2019-07-01 NOTE — H&P ADULT - NSHPPHYSICALEXAM_GEN_ALL_CORE
wdwn woman in severe pain, dry mucous membranes    T(F): 98.4 (07-01-19 @ 10:21), Max: 98.4 (07-01-19 @ 10:21)  HR: 78 (07-01-19 @ 10:21) (78 - 91)  BP: 109/72 (07-01-19 @ 10:21) (109/72 - 136/78)  RR: 18 (07-01-19 @ 10:21) (16 - 20)  SpO2: 98% (07-01-19 @ 10:21) (98% - 99%)    PHYSICAL EXAM:  GENERAL: NAD, well-developed  HEAD:  Atraumatic, Normocephalic  EYES: EOMI, PERRLA, conjunctiva and sclera clear  NECK: Supple, No JVD  CHEST/LUNG: Clear to auscultation bilaterally; No wheeze  HEART: Regular rate and rhythm; No murmurs, rubs, or gallops  ABDOMEN: Soft, RLQ, RUQ tend, no rebound, Nondistended; Bowel sounds present  EXTREMITIES:  2+ Peripheral Pulses, No clubbing, cyanosis, or edema  PSYCH: AAOx3  NEUROLOGY: non-focal  SKIN: No rashes or lesions

## 2019-07-01 NOTE — CONSULT NOTE ADULT - ATTENDING COMMENTS
Pt seen and examined.  Chart reviewed.  Resident note confirmed.  Pt is a 68 year old female with a medical history signficant for Factor V Leiden mutation who presents to [facility] with abdominal pain, anorexia and nausea of one week's duration. She denies fever or chills. She continues to pass flatus and have bowel movements. She saw her gastroenterologist (Dr. Reji Figueredo) on Thursday and treatment with cipro was started for a presumed infectious gastroenteritis. She developed worsening abdominal pain and distension over the weekend and presented to the ED for evaluation.     In the ED, she was afebrile, with normal vital signs. WBC and lactate are normal (1.4). CT reveals  thickened jejunum with mesenteric edema and interloop fluid concerning for venous thrombosis.    PMH/PSH/MEDS/ALL/SH/FH/ROS:  Unchanged from H&P above  Vitals/PE/Labs/Radiographic data:  Reviewed       A/P  Neuro:  Abdominal pain  	Contine to monitor		    CVS:	No active issues  	Monitor vitals    Pulm:  	Atelectasis   	Continue ISP    GI:	Diffuse thickening of the small intestine  	Rule out mesenteric venous thrombosis  	Suggest anticoagulation  	Suggest GI consultation.    :  	No active issues  	Monitor I’s and O’s    Heme:   Factor V Leiden mutation  	Suggest Hematology consultation  	Monitor H/h    ID: 	Rule out infectious etiology for jejunal thickening  	Suggest stool culture.  	Consider abx.    Will follow with you.

## 2019-07-01 NOTE — ED ADULT NURSE REASSESSMENT NOTE - NS ED NURSE REASSESS COMMENT FT1
pt sleeping comfortably, heparin infusing. MD frye spoke to family on the phone with pts permission and updated them as to the plan of care.

## 2019-07-01 NOTE — CONSULT NOTE ADULT - ASSESSMENT
69 y/o woman with h/o Factor V leiden. No h/o thrombosis.  Patient on chronic advil.  Now with possible mesenteric ischemia.   Gi and surgery seeing patient.  No hematologic objection to use of AC if this is felt to be the appropriate dx/course of action.   Case d/w Dr. Kenny who is planning to review findings.      Patient is known to Dr. Pemberton who asked that i see patient today, his team will likely follow in days ahead

## 2019-07-01 NOTE — CONSULT NOTE ADULT - ASSESSMENT
agree with current abx regimen. unclear if hypercoaguble, will need heme input.  agree with current managmetn.  will folow with you

## 2019-07-01 NOTE — H&P ADULT - NSICDXPASTMEDICALHX_GEN_ALL_CORE_FT
PAST MEDICAL HISTORY:  Factor V Leiden     Gastroesophageal reflux disease with esophagitis     History of osteoarthritis     Hypercholesteremia     Insomnia

## 2019-07-01 NOTE — ED PROVIDER NOTE - OBJECTIVE STATEMENT
Attending Malgorzata Carlson: 69 y/o female h/o factor 5 leiden, gerd in the past who gets regular tvus, with last colonoscopy recently presenting with abdominal pain. pt states has tvus a week ago and at that time found to have small amount of free fluid and was supposed to follow up. pt initially denied any pain. over last couple of days worsening lower abdominal pain. +nausea. was scheduled to have o/p CT but noticed today that her abdomen has become more bloated. no fevers. no prior abdominal surgeries. no dysuria. no weight loss or weight gain. denies any trauma to abdomen. additionally reports decreased po

## 2019-07-01 NOTE — CONSULT NOTE ADULT - ASSESSMENT
69 yo woman heterozygous for Factor V Leiden mutation on no ASA, AC presenting with abdominal pain, anorexia and nausea for one week, found to have jejunal thickening secondary to possible venous bowel ischemia.     - NPO / IVF  - GI consultation   - Heme / onc consultation 69 yo woman heterozygous for Factor V Leiden mutation on no ASA, AC presenting with abdominal pain, anorexia and nausea for one week, found to have jejunal thickening secondary to possible venous bowel ischemia.     - NPO / IVF  - GI consultation   - Heme / onc consultation for AC recommendations  - Serial abdominal exams  - No acute surgical intervention     Patient seen and examined with Surgical Attending, Dr. John Huffman MD PGY3  [id number]

## 2019-07-01 NOTE — ED PROVIDER NOTE - CADM POA PRESS ULCER
MARTY       Rupinder Spaulding is a 21 year old  who presents for   Chief Complaint   Patient presents with     RECHECK     weight       Malnutrition  After patient's last visit when she was told that she may need to be admitted in the hospital or have a tube placed for tube feeding her mother and her report that she has been eating much better.  Her mother reports that she has been eating 3 and sometimes 5 meals a day and feeling much better.  Patient also reports feeling better and enjoying her food more.  N 648 form  Mother requested a immigration form assistance.  I informed her that this would take some time and we needed the form and we needed them to come in to complete the form.  A Entellium  was used for  this visit.    +++++++      Problem, Medication and Allergy Lists were reviewed and updated if needed..    Patient is an established patient of this clinic..         Review of Systems:   Review of Systems   Constitutional: Negative for fever.   HENT: Negative for trouble swallowing.    Eyes: Negative for pain.   Respiratory: Negative for cough and wheezing.    Cardiovascular: Negative for chest pain.   Gastrointestinal: Negative for abdominal pain.   Genitourinary: Negative for dysuria and vaginal discharge.            Physical Exam:     Vitals:    11/16/18 1517   BP: 107/71   Pulse: 74   Temp: 97.4  F (36.3  C)   TempSrc: Oral   SpO2: 100%   Weight: 81 lb 9.6 oz (37 kg)     Body mass index is 16.02 kg/(m^2).  Vitals were reviewed and were normal     Physical Exam   Constitutional: She is oriented to person, place, and time. No distress.   She appears thin and cachectic.   Neurological: She is alert and oriented to person, place, and time.   Psychiatric: She has a normal mood and affect. Judgment normal. Cognition and memory are normal.   MENTAL STATUS EXAM  Appearance: appropriate  Attitude: answers with head movements at other times she will answers with one words  Behavior: withdrawn   Eye Contact:  minimal  Speech: Minimally verbal though she is much more verbal when speaking with her community health worker thnt she has with other staff.  Orientation: unable to assess  Mood: appears more depressed than usual  Affect: Mood Congruent, though at times she smiles inappropriately.  Thought Process: unable to assess, she often does not seem to understand the question.  Hallucination: no     Nursing note and vitals reviewed.        Assessment and Plan          1. Moderate malnutrition (H)  Patient has gained significant weight and is doing better also appears to be happier today.  Continue eating 4-5 meals a day  I informed mom and patient that I would like to see her in 1 week and also for 45-minute visit to review her immigration status and do some testing.    Please call or return to clinic if your symptoms don't go away.    Follow up plan  Please make a clinic appointment for follow up with me (REUBEN MIXON) in 1-2   weeks for .  Make a 40 min visit for citizen ship testing.        There are no discontinued medications.    Options for treatment and follow-up care were reviewed with the patient. Rupinder Spaulding  engaged in the decision making process and verbalized understanding of the options discussed and agreed with the final plan.    Reuben Mixon MD   No

## 2019-07-01 NOTE — CONSULT NOTE ADULT - SUBJECTIVE AND OBJECTIVE BOX
OLEG LEO  MRN-[id number]    Patient is a 68y old  Female who presents with a chief complaint of same (01 Jul 2019 07:07)    HPI: 69 y/o woman with no significant PMHX. She has a known family h/o Factor V Leiden which is what led to her being identified in the past as having this problem, heterozygote. Patient saw Dr. Pemberton last week during a preop eval for facelift. Patient has never experienced a thrombotic event ( has used hormone replacement in the past, also had prior hip surgery). Due to arthritis patient ahs been on chronic advil, daily.  Patient now presents with difficulty over last week with abdominal distention and pain. No n/v/d. No f/c/s. Ct scan suggests intestinal thickening. Possible ischemia    PAST MEDICAL & SURGICAL HISTORY:  Insomnia  Gastroesophageal reflux disease with esophagitis  History of osteoarthritis  Hypercholesteremia  Factor V Leiden  S/P LASIK surgery of both eyes  Intrauterine polyp  S/P D&C (status post dilation and curettage)  S/P hip replacement, right      Current Meds  MEDICATIONS  (STANDING):  heparin  Infusion.  Unit(s)/Hr (13 mL/Hr) IV Continuous <Continuous>  metroNIDAZOLE  IVPB 500 milliGRAM(s) IV Intermittent every 8 hours  metroNIDAZOLE  IVPB        MEDICATIONS  (PRN):  heparin  Injectable 6000 Unit(s) IV Push every 6 hours PRN For aPTT less than 40  heparin  Injectable 3000 Unit(s) IV Push every 6 hours PRN For aPTT between 40 - 57      Allergies    codeine (Hives; Anaphylaxis)  Dilaudid (Unknown)  fentanyl (Pruritus; Urticaria)  iodine (Anaphylaxis)    Intolerances    Social History  ,  No tob.  No etoh      FAMILY HISTORY:  Family history of MI (myocardial infarction)  Family history of factor V Leiden mutation: osteoarthritis      REVIEW OF SYSTEMS    General:	Denies fatigue, fevers, chills, sweats, decreased appetite.    Skin/Breast: denies pruritis, rash  	  Ophthalmologic: no change in vision or blurring  	  HEENT	Denies dry mouth, oral sores, dysphagia,  change in hearing.    Respiratory and Thorax:  cough, sob, wheeze, hemoptysis  	  Cardiovascular:	no cp , palp, orthopnea    Gastrointestinal:	no n/v/d constipation. + pain and distention    Genitourinary:	no dysuria of frequency, no hematuria, no flank pain    Musculoskeletal:	no bone or joint pain. no muscle aches.     Neurological:	no change in sensory or motor function. no headache. no weakness.     Psychiatric:	no depression, no anxiety, insomnia.     Hematology/Lymphatics:	no bleeding or bruising      Vital Signs Last 24 Hrs  T(C): 36.7 (01 Jul 2019 03:50), Max: 36.7 (01 Jul 2019 03:50)  T(F): 98 (01 Jul 2019 03:50), Max: 98 (01 Jul 2019 03:50)  HR: 83 (01 Jul 2019 06:35) (78 - 91)  BP: 136/78 (01 Jul 2019 06:35) (123/77 - 136/78)  BP(mean): --  RR: 17 (01 Jul 2019 06:35) (16 - 20)  SpO2: 98% (01 Jul 2019 06:35) (98% - 99%)      PHYSICAL EXAM:  Constitutional: NAD    Eyes: PERRLA EOMI, anicteric sclera    Heent :No oral sores, no pharyngeal injection. moist mucosa.    Neck: supple, no jvd, no LAD    Respiratory: CTA b/l     Cardiovascular: s1s2, no m/g/r    Gastrointestinal: soft, nt, nd, + BS    Extremities: no c/c/e    Neurological:A&O x 3 moves all ext.    Skin: no rash on exposed skin    Lymph Nodes: no lymphadenopathy.      Lab  CBC Full  -  ( 30 Jun 2019 22:36 )  WBC Count : 10.7 K/uL  RBC Count : 4.13 M/uL  Hemoglobin : 13.8 g/dL  Hematocrit : 40.4 %  Platelet Count - Automated : 294 K/uL  Mean Cell Volume : 97.6 fl  Mean Cell Hemoglobin : 33.3 pg  Mean Cell Hemoglobin Concentration : 34.1 gm/dL  Auto Neutrophil # : 8.8 K/uL  Auto Lymphocyte # : 0.6 K/uL  Auto Monocyte # : 0.9 K/uL  Auto Eosinophil # : 0.4 K/uL  Auto Basophil # : 0.0 K/uL  Auto Neutrophil % : 81.9 %  Auto Lymphocyte % : 5.9 %  Auto Monocyte % : 8.6 %  Auto Eosinophil % : 3.6 %  Auto Basophil % : 0.1 %    07-01    131<L>  |  93<L>  |  11  ----------------------------<  136<H>  4.1   |  24  |  0.64    Ca    8.6      01 Jul 2019 00:20  Mg     2.0     06-30    TPro  6.1  /  Alb  2.5<L>  /  TBili  0.4  /  DBili  x   /  AST  29  /  ALT  28  /  AlkPhos  49  07-01    PT/INR - ( 30 Jun 2019 22:36 )   PT: 13.6 sec;   INR: 1.19 ratio         PTT - ( 30 Jun 2019 22:36 )  PTT:20.1 sec    Rad:    Assessment/Plan

## 2019-07-01 NOTE — CHART NOTE - NSCHARTNOTEFT_GEN_A_CORE
GENERAL SURGERY ABDOMINAL EXAM:       Patient examined at bedside. Had some clears today and didn't feel well and feels more bloated, denies vomiting, last passed flatus this AM.    Vital Signs Last 24 Hrs  T(C): 36.8 (01 Jul 2019 17:58), Max: 36.9 (01 Jul 2019 10:21)  T(F): 98.2 (01 Jul 2019 17:58), Max: 98.4 (01 Jul 2019 10:21)  HR: 77 (01 Jul 2019 17:58) (76 - 91)  BP: 108/66 (01 Jul 2019 17:58) (108/66 - 137/76)  BP(mean): --  RR: 18 (01 Jul 2019 17:58) (16 - 20)  SpO2: 96% (01 Jul 2019 17:58) (94% - 99%)      General: NAD, well-nourished  HEENT: Atraumatic, EOMI  Resp: Breathing comfortably on RA  CV: Normal sinus rhythm  Abd: soft, moderately distended increased from previous exam, minimal tenderness in RLQ, no RT, no guarding  Ext: ROMIx4, motor strength intact x 4      Plan:   - NPO/IVF  - Recommend Abdominal XRay  - Repeat labs and lactate  - Serial abdominal exams  - Anticoagulation  - Antibiotics  - Seen with Chief resident VIKY Mcguire, PGY2  Acute Care Surgery, [id number] GENERAL SURGERY ABDOMINAL EXAM:       Patient examined at bedside. Had some clears today and didn't feel well and feels more bloated, denies vomiting, last passed flatus this AM.    Vital Signs Last 24 Hrs  T(C): 36.8 (01 Jul 2019 17:58), Max: 36.9 (01 Jul 2019 10:21)  T(F): 98.2 (01 Jul 2019 17:58), Max: 98.4 (01 Jul 2019 10:21)  HR: 77 (01 Jul 2019 17:58) (76 - 91)  BP: 108/66 (01 Jul 2019 17:58) (108/66 - 137/76)  BP(mean): --  RR: 18 (01 Jul 2019 17:58) (16 - 20)  SpO2: 96% (01 Jul 2019 17:58) (94% - 99%)      General: NAD, well-nourished  HEENT: Atraumatic, EOMI  Resp: Breathing comfortably on RA  CV: Normal sinus rhythm  Abd: soft, moderately distended increased from previous exam, minimal tenderness in RLQ, no RT, no guarding  Ext: ROMIx4, motor strength intact x 4      Plan:   - NPO/IVF  - Recommend Abdominal XRay  - Repeat labs and lactate  - Serial abdominal exams  - Anticoagulation  - Antibiotics  - Seen with Chief resident VIKY Mcguire, PGY2  Acute Care Surgery, [id number]

## 2019-07-01 NOTE — H&P ADULT - HISTORY OF PRESENT ILLNESS
67 yo woman who was in her USOH developed severe RLQ and RUQ abd pain 5 days ago, associated w anorexia nausea, NO diarrhea, no vomiting, no chills, no change in BMs, saw her GI doc a day after and was prescribed CIPRO for an infectious GE, but ptn w no diarrhea. 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 Marquez) Ptn states pain level is 10/10. CT A/P c/w severe intramural jejunal edema w mesenteric edema, suggestive of mesenteric ischemia, all mesenteric vessels are patent, seen by GI, Surgery, Heme, placed on Flagyl, IV Heparin

## 2019-07-01 NOTE — ED PROVIDER NOTE - PHYSICAL EXAMINATION
Attending Malgorzata Carlson: Gen: NAD, heent: atrauamtic, eomi, perrla, mmm, op pink, uvula midline, neck; nttp, no nuchal rigidity, chest: nttp, no crepitus, cv: rrr, no murmurs, lungs: ctab, abd:distended ttp LUQ and RLQ, no peritoneal signs, +BS, no guarding, ext: wwp, neg homans, skin: no rash, neuro: awake and alert, following commands, speech clear, sensation and strength intact, no focal deficits

## 2019-07-01 NOTE — CONSULT NOTE ADULT - ASSESSMENT
68 year old female with  h/o Factor V leiden, HLD, Gerd, presenting with abd pain     1. Abdominal Pain   CT a/p revealed  jejunal thickening secondary to possible venous bowel ischemia.   surgery team f/u , no acute surgical intervention , on abx   NPO / IVF  GI f/u     2. Factor V leiden  a/c per hem/onc   med f/u     3. cv stable  no chest pain or sob. no decomo chf on exam  check ekg     dvt ppx 68 year old female with  h/o Factor V leiden, HLD, Gerd, presenting with abd pain     1. Abdominal Pain   CT a/p revealed  jejunal thickening secondary to possible venous bowel ischemia.   surgery team f/u , no acute surgical intervention , on abx   NPO / IVF  GI f/u     2. Factor V leiden  a/c per hem/onc   med f/u     3. cv stable  no chest pain or sob. no decomp chf on exam  check ekg     dvt ppx English

## 2019-07-01 NOTE — ED PROVIDER NOTE - PMH
Factor V Leiden    Gastroesophageal reflux disease with esophagitis    History of osteoarthritis    Hypercholesteremia    Insomnia

## 2019-07-01 NOTE — CONSULT NOTE ADULT - SUBJECTIVE AND OBJECTIVE BOX
Patient is a 68y old  Female who presents with a chief complaint of abdominal pain (2019 12:50)      HPI:  67 yo woman who was in her USOH developed severe RLQ and RUQ abd pain 5 days ago, associated w anorexia nausea, NO diarrhea, no vomiting, no chills, no change in BMs, saw her GI doc a day after and was prescribed CIPRO for an infectious GE, but ptn w no diarrhea. 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 Marquez) Ptn states pain level is 10/10. CT A/P c/w severe intramural jejunal edema w mesenteric edema, suggestive of mesenteric ischemia, all mesenteric vessels are patent, seen by GI, Surgery, Heme, placed on Flagyl, IV Heparin (2019 12:07)    ER vss, afebrile.  WBC 10.7 --> 9.7.  UA (-) nit/LE.  CTap s/o mesenteric ischemia and possible venous clot.       REVIEW OF SYSTEMS:    CONSTITUTIONAL: No fever, weight loss, or fatigue  EYES: No eye pain, visual disturbances, or discharge  ENMT:  No sore throat  NECK: No pain or stiffness  RESPIRATORY: No cough, wheezing, chills or hemoptysis; No shortness of breath  CARDIOVASCULAR: No chest pain, palpitations, dizziness, or leg swelling  GASTROINTESTINAL: No abdominal or epigastric pain. No nausea, vomiting, or hematemesis; No diarrhea or constipation. No melena or hematochezia.  GENITOURINARY: No dysuria, frequency, hematuria, or incontinence  NEUROLOGICAL: No headaches, memory loss, loss of strength, numbness, or tremors  SKIN: No itching, burning, rashes, or lesions   LYMPH NODES: No enlarged glands  MUSCULOSKELETAL: No joint pain or swelling; No muscle, back, or extremity pain      PAST MEDICAL & SURGICAL HISTORY:  Insomnia  Gastroesophageal reflux disease with esophagitis  History of osteoarthritis  Hypercholesteremia  Factor V Leiden  S/P LASIK surgery of both eyes  Intrauterine polyp  S/P D&C (status post dilation and curettage)  S/P hip replacement, right      Allergies    codeine (Hives; Anaphylaxis)  Dilaudid (Unknown)  fentanyl (Pruritus; Urticaria)  iodine (Anaphylaxis)    Intolerances        FAMILY HISTORY:  Family history of MI (myocardial infarction)  Family history of factor V Leiden mutation: osteoarthritis      SOCIAL HISTORY:        MEDICATIONS  (STANDING):  atorvastatin 10 milliGRAM(s) Oral at bedtime  dextrose 5% + sodium chloride 0.9%. 1000 milliLiter(s) (100 mL/Hr) IV Continuous <Continuous>  dicyclomine 10 milliGRAM(s) Oral four times a day before meals  heparin  Infusion.  Unit(s)/Hr (13 mL/Hr) IV Continuous <Continuous>  metroNIDAZOLE  IVPB      metroNIDAZOLE  IVPB 500 milliGRAM(s) IV Intermittent every 8 hours  ondansetron Injectable 4 milliGRAM(s) IV Push every 6 hours  pantoprazole  Injectable 40 milliGRAM(s) IV Push at bedtime    MEDICATIONS  (PRN):  heparin  Injectable 6000 Unit(s) IV Push every 6 hours PRN For aPTT less than 40  heparin  Injectable 3000 Unit(s) IV Push every 6 hours PRN For aPTT between 40 - 57  ondansetron Injectable 4 milliGRAM(s) IV Push every 6 hours PRN Nausea and/or Vomiting      Vital Signs Last 24 Hrs  T(C): 36.9 (2019 10:21), Max: 36.9 (2019 10:21)  T(F): 98.4 (2019 10:21), Max: 98.4 (2019 10:21)  HR: 78 (2019 10:21) (76 - 91)  BP: 109/72 (2019 10:21) (109/72 - 137/76)  BP(mean): --  RR: 18 (2019 10:21) (16 - 20)  SpO2: 98% (2019 10:21) (94% - 99%)    PHYSICAL EXAM:    GENERAL: NAD, well-groomed  HEAD:  Atraumatic, Normocephalic  EYES: EOMI, PERRLA, conjunctiva and sclera clear  ENMT: No tonsillar erythema, exudates, or enlargement; Moist mucous membranes  NECK: Supple, No JVD  CHEST/LUNG: Clear to percussion bilaterally; No rales, rhonchi, wheezing, or rubs  HEART: Regular rate and rhythm; No murmurs, rubs, or gallops  ABDOMEN: Soft, Nontender, Nondistended; Bowel sounds present  EXTREMITIES:  2+ Peripheral Pulses, No clubbing, cyanosis, or edema  LYMPH: No lymphadenopathy noted  SKIN: No rashes or lesions    LABS:  CBC Full  -  ( 2019 11:14 )  WBC Count : 9.7 K/uL  RBC Count : 3.52 M/uL  Hemoglobin : 11.6 g/dL  Hematocrit : 34.2 %  Platelet Count - Automated : 282 K/uL  Mean Cell Volume : 97.1 fl  Mean Cell Hemoglobin : 33.0 pg  Mean Cell Hemoglobin Concentration : 34.0 gm/dL  Auto Neutrophil # : x  Auto Lymphocyte # : x  Auto Monocyte # : x  Auto Eosinophil # : x  Auto Basophil # : x  Auto Neutrophil % : x  Auto Lymphocyte % : x  Auto Monocyte % : x  Auto Eosinophil % : x  Auto Basophil % : x          131<L>  |  93<L>  |  11  ----------------------------<  136<H>  4.1   |  24  |  0.64    Ca    8.6      2019 00:20  Mg     2.0     -30    TPro  6.1  /  Alb  2.5<L>  /  TBili  0.4  /  DBili  x   /  AST  29  /  ALT  28  /  AlkPhos  49  07-      LIVER FUNCTIONS - ( 2019 00:20 )  Alb: 2.5 g/dL / Pro: 6.1 g/dL / ALK PHOS: 49 U/L / ALT: 28 U/L / AST: 29 U/L / GGT: x                               MICROBIOLOGY:        Urinalysis Basic - ( 2019 00:38 )    Color: Light Yellow / Appearance: Clear / S.036 / pH: x  Gluc: x / Ketone: Small  / Bili: Negative / Urobili: Negative   Blood: x / Protein: Trace / Nitrite: Negative   Leuk Esterase: Negative / RBC: 1 /hpf / WBC 2 /HPF   Sq Epi: x / Non Sq Epi: 1 /hpf / Bacteria: Negative                RADIOLOGY:        < from: CT Abdomen and Pelvis w/ IV Cont (19 @ 23:43) >  EXAM:  CT ABDOMEN AND PELVIS IC                            PROCEDURE DATE:  2019            INTERPRETATION:  Abdominal/Pelvic CT    2019 12:32 AM    Indication: Abdominal distention and diffuse abdominal pain    Technique: Axial images were obtained following IV contrast from the lung   bases through pubic symphysis.  90 cc of Omnipaque 350 was administered   intravenously without complication and 10 cc was discarded.  Reformatted   coronal and sagittal images are submitted.    Comparison: None    FINDINGS:    LUNG BASES:  There are no pleural effusions.  PERITONEUM:  There is no free air or focal collection.  Mild volume of   abdominopelvic ascites  LIVER: Normal.  SPLEEN: Normal.  GALLBLADDER: Contracted.  BILIARY TREE: Unremarkable.  PANCREAS: Normal.  ADRENAL GLANDS: Normal.  KIDNEYS: Normal.  BOWEL: Small hiatal hernia. The stomach is under distended. Marked wall   thickening, intramural edema and inflammatory change involving the   jejunum with transition to normal bowelin the left hemiabdomen. There is   severe mesenteric edema and interloop fluid. Distal bowel loops are   collapsed. The appendix is unremarkable. There is scattered colonic   diverticulosis. The colon is collapsed.    URINARY BLADDER: Incompletely distended.  PELVIC ORGANS: No pelvic masses.    There is no significant adenopathy.  VASCULATURE: The superior mesenteric vein, splenic vein, and main portal   vein are patent. There are no obvious filling defects visualized. The   superior mesenteric artery is patent proximally.  RETROPERITONEUM:  There is no mass.  BONES: The patient is status post right total hip arthroplasty.  ABDOMINAL WALL: Unremarkable.    IMPRESSION:    Marked thickening and intramural edema involving the jejunum.    Marked  mesenteric edema and interloop fluid. Appearance suggests venous bowel   ischemia.  No pneumatosis or portal venous gas. The superior mesenteric   vein and superior mesenteric artery appear patent proximally.     < end of copied text > Patient is a 68y old  Female who presents with a chief complaint of abdominal pain (2019 12:50)      HPI:  67 yo woman who was in her USOH developed severe RLQ and RUQ abd pain 5 days ago, associated w anorexia nausea, NO diarrhea, no vomiting, no chills, no change in BMs, saw her GI doc a day after and was prescribed CIPRO for an infection, but ptn w no diarrhea, n/v. 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 Marquez) Ptn states pain level is 10/10. CT A/P c/w severe intramural jejunal edema w mesenteric edema, suggestive of mesenteric ischemia, all mesenteric vessels are patent, seen by GI, Surgery, Heme, placed on Flagyl, IV Heparin (2019 12:07)    ER vss, afebrile.  WBC 10.7 --> 9.7.  UA (-) nit/LE.  CTap s/o mesenteric ischemia and possible venous clot.  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  Pt states she has had giardia in the past, and her current symptoms are very similar to when she had giardia infection.  No change in dietary habits, no sick contacts.    Pt c/o feeling bloated, has not had a BM for 2-3 days.      ID consult called for further abx managment.      REVIEW OF SYSTEMS:    CONSTITUTIONAL: No fever, weight loss, or fatigue  EYES: No eye pain, visual disturbances, or discharge  ENMT:  No sore throat  NECK: No pain or stiffness  RESPIRATORY: No cough, wheezing, chills or hemoptysis; No shortness of breath  CARDIOVASCULAR: No chest pain, palpitations, dizziness, or leg swelling  GASTROINTESTINAL: No abdominal or epigastric pain. No nausea, vomiting, or hematemesis; No diarrhea or constipation. No melena or hematochezia.  GENITOURINARY: No dysuria, frequency, hematuria, or incontinence  NEUROLOGICAL: No headaches, memory loss, loss of strength, numbness, or tremors  SKIN: No itching, burning, rashes, or lesions   LYMPH NODES: No enlarged glands  MUSCULOSKELETAL: No joint pain or swelling; No muscle, back, or extremity pain      PAST MEDICAL & SURGICAL HISTORY:  Insomnia  Gastroesophageal reflux disease with esophagitis  History of osteoarthritis  Hypercholesteremia  Factor V Leiden  S/P LASIK surgery of both eyes  Intrauterine polyp  S/P D&C (status post dilation and curettage)  S/P hip replacement, right      Allergies    codeine (Hives; Anaphylaxis)  Dilaudid (Unknown)  fentanyl (Pruritus; Urticaria)  iodine (Anaphylaxis)    Intolerances        FAMILY HISTORY:  Family history of MI (myocardial infarction)  Family history of factor V Leiden mutation: osteoarthritis      SOCIAL HISTORY:    No smoking.  NO recent travel or sick contacts.  Psychologist by profession    MEDICATIONS  (STANDING):  atorvastatin 10 milliGRAM(s) Oral at bedtime  dextrose 5% + sodium chloride 0.9%. 1000 milliLiter(s) (100 mL/Hr) IV Continuous <Continuous>  dicyclomine 10 milliGRAM(s) Oral four times a day before meals  heparin  Infusion.  Unit(s)/Hr (13 mL/Hr) IV Continuous <Continuous>  metroNIDAZOLE  IVPB      metroNIDAZOLE  IVPB 500 milliGRAM(s) IV Intermittent every 8 hours  ondansetron Injectable 4 milliGRAM(s) IV Push every 6 hours  pantoprazole  Injectable 40 milliGRAM(s) IV Push at bedtime    MEDICATIONS  (PRN):  heparin  Injectable 6000 Unit(s) IV Push every 6 hours PRN For aPTT less than 40  heparin  Injectable 3000 Unit(s) IV Push every 6 hours PRN For aPTT between 40 - 57  ondansetron Injectable 4 milliGRAM(s) IV Push every 6 hours PRN Nausea and/or Vomiting      Vital Signs Last 24 Hrs  T(C): 36.9 (2019 10:21), Max: 36.9 (2019 10:21)  T(F): 98.4 (2019 10:21), Max: 98.4 (2019 10:21)  HR: 78 (2019 10:21) (76 - 91)  BP: 109/72 (2019 10:21) (109/72 - 137/76)  BP(mean): --  RR: 18 (2019 10:21) (16 - 20)  SpO2: 98% (2019 10:21) (94% - 99%)    PHYSICAL EXAM:    GENERAL: NAD, well-groomed  HEAD:  Atraumatic, Normocephalic  EYES: EOMI, PERRLA, conjunctiva and sclera clear  ENMT: No tonsillar erythema, exudates, or enlargement; Moist mucous membranes  NECK: Supple, No JVD  CHEST/LUNG: Clear to percussion bilaterally; No rales, rhonchi, wheezing, or rubs  HEART: Regular rate and rhythm; No murmurs, rubs, or gallops  ABDOMEN: Soft, (+) TTP in upper quadrants, no rebound/guarding, (+) distention,  Bowel sounds present  EXTREMITIES:  2+ Peripheral Pulses, No clubbing, cyanosis, or edema  LYMPH: No lymphadenopathy noted  SKIN: No rashes or lesions    LABS:  CBC Full  -  ( 2019 11:14 )  WBC Count : 9.7 K/uL  RBC Count : 3.52 M/uL  Hemoglobin : 11.6 g/dL  Hematocrit : 34.2 %  Platelet Count - Automated : 282 K/uL  Mean Cell Volume : 97.1 fl  Mean Cell Hemoglobin : 33.0 pg  Mean Cell Hemoglobin Concentration : 34.0 gm/dL  Auto Neutrophil # : x  Auto Lymphocyte # : x  Auto Monocyte # : x  Auto Eosinophil # : x  Auto Basophil # : x  Auto Neutrophil % : x  Auto Lymphocyte % : x  Auto Monocyte % : x  Auto Eosinophil % : x  Auto Basophil % : x      07-01    131<L>  |  93<L>  |  11  ----------------------------<  136<H>  4.1   |  24  |  0.64    Ca    8.6      2019 00:20  Mg     2.0     06-30    TPro  6.1  /  Alb  2.5<L>  /  TBili  0.4  /  DBili  x   /  AST  29  /  ALT  28  /  AlkPhos  49  07-01      LIVER FUNCTIONS - ( 2019 00:20 )  Alb: 2.5 g/dL / Pro: 6.1 g/dL / ALK PHOS: 49 U/L / ALT: 28 U/L / AST: 29 U/L / GGT: x                               MICROBIOLOGY:        Urinalysis Basic - ( 2019 00:38 )    Color: Light Yellow / Appearance: Clear / S.036 / pH: x  Gluc: x / Ketone: Small  / Bili: Negative / Urobili: Negative   Blood: x / Protein: Trace / Nitrite: Negative   Leuk Esterase: Negative / RBC: 1 /hpf / WBC 2 /HPF   Sq Epi: x / Non Sq Epi: 1 /hpf / Bacteria: Negative                RADIOLOGY:        < from: CT Abdomen and Pelvis w/ IV Cont (19 @ 23:43) >  EXAM:  CT ABDOMEN AND PELVIS IC                            PROCEDURE DATE:  2019            INTERPRETATION:  Abdominal/Pelvic CT    2019 12:32 AM    Indication: Abdominal distention and diffuse abdominal pain    Technique: Axial images were obtained following IV contrast from the lung   bases through pubic symphysis.  90 cc of Omnipaque 350 was administered   intravenously without complication and 10 cc was discarded.  Reformatted   coronal and sagittal images are submitted.    Comparison: None    FINDINGS:    LUNG BASES:  There are no pleural effusions.  PERITONEUM:  There is no free air or focal collection.  Mild volume of   abdominopelvic ascites  LIVER: Normal.  SPLEEN: Normal.  GALLBLADDER: Contracted.  BILIARY TREE: Unremarkable.  PANCREAS: Normal.  ADRENAL GLANDS: Normal.  KIDNEYS: Normal.  BOWEL: Small hiatal hernia. The stomach is under distended. Marked wall   thickening, intramural edema and inflammatory change involving the   jejunum with transition to normal bowelin the left hemiabdomen. There is   severe mesenteric edema and interloop fluid. Distal bowel loops are   collapsed. The appendix is unremarkable. There is scattered colonic   diverticulosis. The colon is collapsed.    URINARY BLADDER: Incompletely distended.  PELVIC ORGANS: No pelvic masses.    There is no significant adenopathy.  VASCULATURE: The superior mesenteric vein, splenic vein, and main portal   vein are patent. There are no obvious filling defects visualized. The   superior mesenteric artery is patent proximally.  RETROPERITONEUM:  There is no mass.  BONES: The patient is status post right total hip arthroplasty.  ABDOMINAL WALL: Unremarkable.    IMPRESSION:    Marked thickening and intramural edema involving the jejunum.    Marked  mesenteric edema and interloop fluid. Appearance suggests venous bowel   ischemia.  No pneumatosis or portal venous gas. The superior mesenteric   vein and superior mesenteric artery appear patent proximally.     < end of copied text >

## 2019-07-01 NOTE — H&P ADULT - NSICDXFAMILYHX_GEN_ALL_CORE_FT
FAMILY HISTORY:  Family history of factor V Leiden mutation, osteoarthritis  Family history of MI (myocardial infarction)

## 2019-07-01 NOTE — CONSULT NOTE ADULT - ASSESSMENT
67 yo woman who was in her USOH developed severe RLQ and RUQ abd pain 5 days ago, associated w anorexia nausea, NO diarrhea, no vomiting, no chills, no change in BMs, saw her GI doc a day after and was prescribed CIPRO for an infection, but ptn w no diarrhea, n/v. 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 Marquez) Ptn states pain level is 10/10. CT A/P c/w severe intramural jejunal edema w mesenteric edema, suggestive of mesenteric ischemia, all mesenteric vessels are patent, seen by GI, Surgery, Heme, placed on Flagyl, IV Heparin (01 Jul 2019 12:07)    ER vss, afebrile.  WBC 10.7 --> 9.7.  UA (-) nit/LE.  CTap s/o mesenteric ischemia and possible venous clot, now on heparin gtt.  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  Pt states she has had giardia in the past, and her current symptoms are very similar to when she had giardia infection.  No change in dietary habits, no sick contacts.    Pt c/o feeling bloated, has not had a BM for 2-3 days.      ID consult called for further abx managment.      r/o infectious enteritits:    - Findings s/o mesenteric ischemia.  Thickening and edema seen in the jejunum and mesenteric edema noted on CT imaging. Pt started on heparin gtt for anticoagulation.    - r/o infectious etiologies.  Send stool cx, O&P, GI pcr.  Cont flagyl/rocephin on empiric basis.  Pt planned for EGD, f/u path reports.    Will follow,    Paty Bennett  [phone] 67 yo woman who was in her USOH developed severe RLQ and RUQ abd pain 5 days ago, associated w anorexia nausea, NO diarrhea, no vomiting, no chills, no change in BMs, saw her GI doc a day after and was prescribed CIPRO for an infection, but ptn w no diarrhea, n/v. 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 Marquez) Ptn states pain level is 10/10. CT A/P c/w severe intramural jejunal edema w mesenteric edema, suggestive of mesenteric ischemia, all mesenteric vessels are patent, seen by GI, Surgery, Heme, placed on Flagyl, IV Heparin (01 Jul 2019 12:07)    ER vss, afebrile.  WBC 10.7 --> 9.7.  UA (-) nit/LE.  CTap s/o mesenteric ischemia and possible venous clot, now on heparin gtt.  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  Pt states she has had giardia in the past, and her current symptoms are very similar to when she had giardia infection.  No change in dietary habits, no sick contacts.    Pt c/o feeling bloated, has not had a BM for 2-3 days.      ID consult called for further abx managment.      r/o infectious enteritits:    - Findings s/o mesenteric ischemia.  Thickening and edema seen in the jejunum and mesenteric edema noted on CT imaging. Pt started on heparin gtt for anticoagulation.    - r/o infectious etiologies.  Send stool cx, O&P, GI pcr, giardia Ag.  Cont flagyl/rocephin on empiric basis.  Pt planned for EGD, f/u path reports.    Will follow,    Paty Bennett  [phone]

## 2019-07-01 NOTE — CHART NOTE - NSCHARTNOTEFT_GEN_A_CORE
Received a call from Surgery team re: antibiotic treatment option. Patient is currently on empiric ABx with Ceftriaxone and Flagyl for possible GI infection. As per surgery, Received a call from Surgery team re: antibiotic treatment option. Patient is currently on empiric ABx with Ceftriaxone and Flagyl for possible GI infection. As per surgery, 1) Zosyn or 2) Cipro with Flagyl give better coverage. Unable to reach Dr. Duke at this time. Will maintain current ID recommendation of ABx. Primary team / ID team to decide ABx. Will monitor closely and endorse to primary team in AM.    Pallavi Maloney PA-C

## 2019-07-01 NOTE — ED PROVIDER NOTE - PROGRESS NOTE DETAILS
Reuben Hidalgo, PGY-1: she states that she gets yearly routine TVUS, saw hematologist for presurgical work up, was get plastic surgery. Attending Malgorzata Carlson: d/w hem/onc who recommeds starting heparin, pt seen by surgery will follow, continue abx and anticoagulation Attending Malgorzata Carlson: pt seen by surgery will follow, continue abx and anticoagulation. d/w surgery attending heme onc consult to see

## 2019-07-01 NOTE — CONSULT NOTE ADULT - SUBJECTIVE AND OBJECTIVE BOX
CARDIOLOGY CONSULT - Dr. Lloyd         HPI:  67 yo woman  with Pmed hx as mentioned below presenting with severe RLQ and RUQ abd pain 5 days ago, associated w anorexia nausea.  CT A/P c/w severe intramural jejunal edema w mesenteric edema, suggestive of mesenteric ischemia, all mesenteric vessels are patent. Patient being evaluated to cardiac management, possible clearance for any upcoming procedures. She denies any cp, sob, orthopnea, LE edema or palps. Denies hx of chf, cad, valvula disease or mi. h/o Factor V leiden. No h/o thrombosis. Patient on chronic advil. Reports hx of PAC dx several years ago, not on any av barbara meds, resolved after decreasing caffeine intake. Echo >2 years ago from outpt, reportedly unremarkable. ROS otherwise negative.       PAST MEDICAL & SURGICAL HISTORY:  Insomnia  Gastroesophageal reflux disease with esophagitis  History of osteoarthritis  Hypercholesteremia  Factor V Leiden  S/P LASIK surgery of both eyes  Intrauterine polyp  S/P D&C (status post dilation and curettage)  S/P hip replacement, right          PREVIOUS DIAGNOSTIC TESTING:    [ ] Echocardiogram:  [ ]  Catheterization:  [ ] Stress Test:  	      MEDICATIONS:  Home Medications:  Lipitor 10 mg oral tablet: 1 tab(s) orally once a day (at bedtime) (01 Jul 2019 12:24)  pantoprazole 40 mg oral delayed release tablet: 1 tab(s) orally once a day (01 Jul 2019 12:24)      MEDICATIONS  (STANDING):  atorvastatin 10 milliGRAM(s) Oral at bedtime  dextrose 5% + sodium chloride 0.9%. 1000 milliLiter(s) (100 mL/Hr) IV Continuous <Continuous>  dicyclomine 10 milliGRAM(s) Oral four times a day before meals  heparin  Infusion.  Unit(s)/Hr (13 mL/Hr) IV Continuous <Continuous>  metroNIDAZOLE  IVPB      metroNIDAZOLE  IVPB 500 milliGRAM(s) IV Intermittent every 8 hours  ondansetron Injectable 4 milliGRAM(s) IV Push every 6 hours  pantoprazole  Injectable 40 milliGRAM(s) IV Push at bedtime  traMADol 50 milliGRAM(s) Oral every 8 hours      FAMILY HISTORY:  Family history of MI (myocardial infarction)  Family history of factor V Leiden mutation: osteoarthritis      SOCIAL HISTORY:    [ x] Non-smoker  [ ] Smoker  [ ] Alcohol    Allergies    codeine (Hives; Anaphylaxis)  Dilaudid (Unknown)  fentanyl (Pruritus; Urticaria)  iodine (Anaphylaxis)    Intolerances    	    REVIEW OF SYSTEMS:  CONSTITUTIONAL: No fever, weight loss, or fatigue  EYES: No eye pain, visual disturbances, or discharge  ENMT:  No difficulty hearing, tinnitus, vertigo; No sinus or throat pain  NECK: No pain or stiffness  RESPIRATORY: No cough, wheezing, chills or hemoptysis; No Shortness of Breath  CARDIOVASCULAR: No chest pain, palpitations, passing out, dizziness, or leg swelling  GASTROINTESTINAL: SEE HPI   GENITOURINARY: No dysuria, frequency, hematuria, or incontinence  NEUROLOGICAL: No headaches, memory loss, loss of strength, numbness, or tremors  SKIN: No itching, burning, rashes, or lesions   	    [x ] All others negative	  [ ] Unable to obtain    PHYSICAL EXAM:  T(C): 36.9 (07-01-19 @ 10:21), Max: 36.9 (07-01-19 @ 10:21)  HR: 78 (07-01-19 @ 10:21) (78 - 91)  BP: 109/72 (07-01-19 @ 10:21) (109/72 - 136/78)  RR: 18 (07-01-19 @ 10:21) (16 - 20)  SpO2: 98% (07-01-19 @ 10:21) (98% - 99%)  Wt(kg): --  I&O's Summary      Appearance: Normal	  Psychiatry: A & O x 3, Mood & affect appropriate  HEENT:   Normal oral mucosa, PERRL, EOMI	  Lymphatic: No lymphadenopathy  Cardiovascular: Normal S1 S2,RRR, No JVD, No murmurs  Respiratory: Lungs clear to auscultation	  Gastrointestinal:  Soft, Non-tender, + BS	  Skin: No rashes, No ecchymoses, No cyanosis	  Neurologic: Non-focal  Extremities: Normal range of motion, No clubbing, cyanosis or edema  Vascular: Peripheral pulses palpable 2+ bilaterally    TELEMETRY: 	    ECG: pending  	  RADIOLOGY:  < from: CT Abdomen and Pelvis w/ IV Cont (06.30.19 @ 23:43) >  IMPRESSION:    Marked thickening and intramural edema involving the jejunum.    Marked  mesenteric edema and interloop fluid. Appearance suggests venous bowel   ischemia.  No pneumatosis or portal venous gas. The superior mesenteric   vein and superior mesenteric artery appear patent proximally.  Findings   discussed with Dr. Carlson at1:10 am on 7/1/19 with RBV.          < end of copied text >    OTHER: 	  	  LABS:	 	    CARDIAC MARKERS:                                  11.6   9.7   )-----------( 282      ( 01 Jul 2019 11:14 )             34.2     07-01    131<L>  |  93<L>  |  11  ----------------------------<  136<H>  4.1   |  24  |  0.64    Ca    8.6      01 Jul 2019 00:20  Mg     2.0     06-30    TPro  6.1  /  Alb  2.5<L>  /  TBili  0.4  /  DBili  x   /  AST  29  /  ALT  28  /  AlkPhos  49  07-01    PT/INR - ( 30 Jun 2019 22:36 )   PT: 13.6 sec;   INR: 1.19 ratio         PTT - ( 01 Jul 2019 09:44 )  PTT:54.9 sec  proBNP:   Lipid Profile:   HgA1c:   TSH:

## 2019-07-01 NOTE — H&P ADULT - NSICDXPASTSURGICALHX_GEN_ALL_CORE_FT
PAST SURGICAL HISTORY:  Intrauterine polyp     S/P D&C (status post dilation and curettage)     S/P hip replacement, right     S/P LASIK surgery of both eyes

## 2019-07-01 NOTE — CONSULT NOTE ADULT - SUBJECTIVE AND OBJECTIVE BOX
Patient is a 68y old  Female who presents with a chief complaint of abdominal pain    HPI: factor 5 heterozygote with no history of event.  pt with 1 week of abdominal pan and ? chills.  denies travel or diarrhea, n/v  now with ? [place] clot on ct      PAST MEDICAL & SURGICAL HISTORY:  Insomnia  Gastroesophageal reflux disease with esophagitis  History of osteoarthritis  Hypercholesteremia  Factor V Leiden  S/P LASIK surgery of both eyes  Intrauterine polyp  S/P D&C (status post dilation and curettage)  S/P hip replacement, right      MEDICATIONS  (STANDING):  heparin  Infusion.  Unit(s)/Hr (13 mL/Hr) IV Continuous <Continuous>  metroNIDAZOLE  IVPB 500 milliGRAM(s) IV Intermittent every 8 hours  metroNIDAZOLE  IVPB          Allergies    codeine (Hives; Anaphylaxis)  Dilaudid (Unknown)  fentanyl (Pruritus; Urticaria)  iodine (Anaphylaxis)    Intolerances        SOCIAL HISTORY:  Denies ETOh,Smoking,     FAMILY HISTORY:  Family history of MI (myocardial infarction)  Family history of factor V Leiden mutation: osteoarthritis      REVIEW OF SYSTEMS:    CONSTITUTIONAL: No weakness, fevers or chills  EYES/ENT: No visual changes;  No vertigo or throat pain   NECK: No pain or stiffness  RESPIRATORY: No cough, wheezing, hemoptysis; No shortness of breath  CARDIOVASCULAR: No chest pain or palpitations  GASTROINTESTINAL: No abdominal or epigastric pain. No nausea, vomiting, or hematemesis; No diarrhea or constipation. No melena or hematochezia.  GENITOURINARY: No dysuria, frequency or hematuria  NEUROLOGICAL: No numbness or weakness  SKIN: No itching, burning, rashes, or lesions   All other review of systems is negative unless indicated above.    VITAL:  T(C): , Max: 36.7 (07-01-19 @ 03:50)  T(F): , Max: 98 (07-01-19 @ 03:50)  HR: 83 (07-01-19 @ 06:35)  BP: 136/78 (07-01-19 @ 06:35)  BP(mean): --  RR: 17 (07-01-19 @ 06:35)  SpO2: 98% (07-01-19 @ 06:35)  Wt(kg): --    I and O's:    Height (cm): 172.72 (06-30 @ 21:08)  Weight (kg): 74.8 (07-01 @ 03:23)  BMI (kg/m2): 25.1 (07-01 @ 03:23)  BSA (m2): 1.88 (07-01 @ 03:23)    PHYSICAL EXAM:    Constitutional: NAD  HEENT: PERRLA,   Neck: No JVD  Respiratory: CTA B/L  Cardiovascular: S1 and S2  Gastrointestinal: BS+, soft, NT/ND  Extremities: No peripheral edema  Neurological: A/O x 3, no focal deficits  Psychiatric: Normal mood, normal affect  : No Redd  Skin: No rashes  Access: Not applicable  Back: No CVA tenderness    LABS:                        13.8   10.7  )-----------( 294      ( 30 Jun 2019 22:36 )             40.4     07-01    131<L>  |  93<L>  |  11  ----------------------------<  136<H>  4.1   |  24  |  0.64    Ca    8.6      01 Jul 2019 00:20  Mg     2.0     06-30    TPro  6.1  /  Alb  2.5<L>  /  TBili  0.4  /  DBili  x   /  AST  29  /  ALT  28  /  AlkPhos  49  07-01          RADIOLOGY & ADDITIONAL STUDIES:

## 2019-07-01 NOTE — H&P ADULT - ASSESSMENT
69 yo woman w acute abd pain started 5 days ago, no diarrhea, CT findings c/w jejunal inflammation, ot clear why, no sign of vascular clot, possible mesenteric ischemia, seen by heme and GI and due to h/o being possibly hypercoagulable ptn is now on HEPARIN drip, will switch to sc lovenox to cont full AC though doubt this is vascular driven. awaiting Endoscopy tomorrow. cont empiric ABx. place on clears, place on tramadol since ptn is allergic to mult pain meds but was OK w tramadol in the past, cont prn ZOFRAN, place on IV protonix

## 2019-07-01 NOTE — CONSULT NOTE ADULT - SUBJECTIVE AND OBJECTIVE BOX
HPI:    PMHx: Factor V Leiden (heterozygous)    PSHx: Hip replacement x 2 (, )          Medications (inpatient): lactated ringers Bolus 1000 milliLiter(s) IV Bolus once  metroNIDAZOLE  IVPB      metroNIDAZOLE  IVPB 500 milliGRAM(s) IV Intermittent every 8 hours    Medications (PRN):  Allergies: codeine (Hives; Anaphylaxis)  Dilaudid (Unknown)  fentanyl (Pruritus; Urticaria)  iodine (Anaphylaxis)  (Intolerances: )  Social Hx:     Physical Exam  T(C): 36.5 (19 @ 21:08)  HR: 91 (19 @ 21:08) (91 - 91)  BP: 131/71 (19 @ 21:08) (131/71 - 131/71)  RR: 20 (19 @ 21:08) (20 - 20)  SpO2: 99% (19 @ 21:08) (99% - 99%)  Tmax: T(C): , Max: 36.5 (19 @ 21:08)    General: well developed, well nourished, NAD  Neuro: alert and oriented, no focal deficits, moves all extremities spontaneously  HEENT: NCAT, EOMI, anicteric, mucosa moist  Respiratory: airway patent, respirations unlabored  CVS: regular rate and rhythm  Abdomen: soft, mildly distended, RUQ/ RLQ mild tenderness.   Extremities: no edema, sensation and movement grossly intact  Skin: warm, dry, appropriate color    Labs:                        13.8   10.7  )-----------( 294      ( 2019 22:36 )             40.4     PT/INR - ( 2019 22:36 )   PT: 13.6 sec;   INR: 1.19 ratio         PTT - ( 2019 22:36 )  PTT:20.1 sec      131<L>  |  93<L>  |  11  ----------------------------<  136<H>  4.1   |  24  |  0.64    Ca    8.6      2019 00:20  Mg     2.0         TPro  6.1  /  Alb  2.5<L>  /  TBili  0.4  /  DBili  x   /  AST  29  /  ALT  28  /  AlkPhos  49      Urinalysis Basic - ( 2019 00:38 )    Color: Light Yellow / Appearance: Clear / S.036 / pH: x  Gluc: x / Ketone: Small  / Bili: Negative / Urobili: Negative   Blood: x / Protein: Trace / Nitrite: Negative   Leuk Esterase: Negative / RBC: 1 /hpf / WBC 2 /HPF   Sq Epi: x / Non Sq Epi: 1 /hpf / Bacteria: Negative    Imaging and other studies:  < from: CT Abdomen and Pelvis w/ IV Cont (19 @ 23:43) >    EXAM:  CT ABDOMEN AND PELVIS IC                        PROCEDURE DATE:  2019      INTERPRETATION:  Abdominal/Pelvic CT    2019 12:32 AM    Indication: Abdominal distention and diffuse abdominal pain    Technique: Axial images were obtained following IV contrast from the lung   bases through pubic symphysis.  90 cc of Omnipaque 350 was administered   intravenously without complication and 10 cc was discarded.  Reformatted   coronal and sagittal images are submitted.    Comparison: None    FINDINGS:    LUNG BASES:  There are no pleural effusions.  PERITONEUM:  There is no free air or focal collection.  Mild volume of   abdominopelvic ascites  LIVER: Normal.  SPLEEN: Normal.  GALLBLADDER: Contracted.  BILIARY TREE: Unremarkable.  PANCREAS: Normal.  ADRENAL GLANDS: Normal.  KIDNEYS: Normal.  BOWEL: Small hiatal hernia. The stomach is under distended. Marked wall   thickening, intramural edema and inflammatory change involving the   jejunum with transition to normal bowelin the left hemiabdomen. There is   severe mesenteric edema and interloop fluid. Distal bowel loops are   collapsed. The appendix is unremarkable. There is scattered colonic   diverticulosis. The colon is collapsed.    URINARY BLADDER: Incompletely distended.  PELVIC ORGANS: No pelvic masses.    There is no significant adenopathy.  VASCULATURE: The superior mesenteric vein, splenic vein, and main portal   vein are patent. There are no obvious filling defects visualized. The   superior mesenteric artery is patent proximally.  RETROPERITONEUM:  There is no mass.  BONES: The patient is status post right total hip arthroplasty.  ABDOMINAL WALL: Unremarkable.    IMPRESSION:    Marked thickening and intramural edema involving the jejunum.    Marked  mesenteric edema and interloop fluid. Appearance suggests venous bowel   ischemia.  No pneumatosis or portal venous gas. The superior mesenteric   vein and superior mesenteric artery appear patent proximally.  Findings   discussed with Dr. Carlson at1:10 am on 19 with RBV.    MAO JING M.D, ATTENDING RADIOLOGIST  This document has been electronically signed. 2019  1:11AM        < end of copied text > HPI: 69 yo woman heterozygous for Factor V Leiden mutation on no ASA, AC presenting with abdominal pain, anorexia and nausea for one week. She denies fever or chills. She continues to pass flatus and have bowel movements. She saw her gastroenterologist (Dr. Reji Figueredo) on Thursday and was initiated on cipro. However, despite this regimen she developed worsening abdominal distension and pain over the weekend and sought ED evaluation. Of note, patient denies prior thrombotic events.    In the ED, patient is afebrile and hemodynamically normal. She has no leukocytosis and her lactate is normal (1.4). On exam, she is well-appearing with a soft, minimally distended abdomen with RLQ/ RUQ tenderness. CT indicates a thickened jejunum with mesenteric edema and interloop fluid concerning for venous bowel ischemia.     GI: Dr. Reji Figueredo; Heme/ Onc: Dr. Vega Pemberton ([facility]).     PMHx: Factor V Leiden (heterozygous)    PSHx: Hip replacement x 2 (, )    Medications (inpatient): lactated ringers Bolus 1000 milliLiter(s) IV Bolus once  metroNIDAZOLE  IVPB      metroNIDAZOLE  IVPB 500 milliGRAM(s) IV Intermittent every 8 hours    Medications (home): Protonix    Allergies: codeine (Hives; Anaphylaxis)  Dilaudid (Unknown)  fentanyl (Pruritus; Urticaria)  iodine (Anaphylaxis)  (Intolerances: )    Social Hx: Nonsmoker, social alcohol use. Psychiatrist     Physical Exam  T(C): 36.5 (19 @ 21:08)  HR: 91 (19 @ 21:08) (91 - 91)  BP: 131/71 (19 @ 21:08) (131/71 - 131/71)  RR: 20 (19 @ 21:08) (20 - 20)  SpO2: 99% (19 @ 21:08) (99% - 99%)  Tmax: T(C): , Max: 36.5 (19 @ 21:08)    General: well developed, well nourished, NAD  Neuro: alert and oriented, no focal deficits, moves all extremities spontaneously  HEENT: NCAT, EOMI, anicteric, mucosa moist  Respiratory: airway patent, respirations unlabored  CVS: regular rate and rhythm  Abdomen: soft, mildly distended, RUQ/ RLQ mild tenderness.   Extremities: no edema, sensation and movement grossly intact  Skin: warm, dry, appropriate color    Labs:                        13.8   10.7  )-----------( 294      ( 2019 22:36 )             40.4     PT/INR - ( 2019 22:36 )   PT: 13.6 sec;   INR: 1.19 ratio         PTT - ( 2019 22:36 )  PTT:20.1 sec      131<L>  |  93<L>  |  11  ----------------------------<  136<H>  4.1   |  24  |  0.64    Ca    8.6      2019 00:20  Mg     2.0     -30    TPro  6.1  /  Alb  2.5<L>  /  TBili  0.4  /  DBili  x   /  AST  29  /  ALT  28  /  AlkPhos  49  07-    Urinalysis Basic - ( 2019 00:38 )    Color: Light Yellow / Appearance: Clear / S.036 / pH: x  Gluc: x / Ketone: Small  / Bili: Negative / Urobili: Negative   Blood: x / Protein: Trace / Nitrite: Negative   Leuk Esterase: Negative / RBC: 1 /hpf / WBC 2 /HPF   Sq Epi: x / Non Sq Epi: 1 /hpf / Bacteria: Negative    Imaging and other studies:  < from: CT Abdomen and Pelvis w/ IV Cont (19 @ 23:43) >    EXAM:  CT ABDOMEN AND PELVIS IC                        PROCEDURE DATE:  2019      INTERPRETATION:  Abdominal/Pelvic CT    2019 12:32 AM    Indication: Abdominal distention and diffuse abdominal pain    Technique: Axial images were obtained following IV contrast from the lung   bases through pubic symphysis.  90 cc of Omnipaque 350 was administered   intravenously without complication and 10 cc was discarded.  Reformatted   coronal and sagittal images are submitted.    Comparison: None    FINDINGS:    LUNG BASES:  There are no pleural effusions.  PERITONEUM:  There is no free air or focal collection.  Mild volume of   abdominopelvic ascites  LIVER: Normal.  SPLEEN: Normal.  GALLBLADDER: Contracted.  BILIARY TREE: Unremarkable.  PANCREAS: Normal.  ADRENAL GLANDS: Normal.  KIDNEYS: Normal.  BOWEL: Small hiatal hernia. The stomach is under distended. Marked wall   thickening, intramural edema and inflammatory change involving the   jejunum with transition to normal bowelin the left hemiabdomen. There is   severe mesenteric edema and interloop fluid. Distal bowel loops are   collapsed. The appendix is unremarkable. There is scattered colonic   diverticulosis. The colon is collapsed.    URINARY BLADDER: Incompletely distended.  PELVIC ORGANS: No pelvic masses.    There is no significant adenopathy.  VASCULATURE: The superior mesenteric vein, splenic vein, and main portal   vein are patent. There are no obvious filling defects visualized. The   superior mesenteric artery is patent proximally.  RETROPERITONEUM:  There is no mass.  BONES: The patient is status post right total hip arthroplasty.  ABDOMINAL WALL: Unremarkable.    IMPRESSION:    Marked thickening and intramural edema involving the jejunum.    Marked  mesenteric edema and interloop fluid. Appearance suggests venous bowel   ischemia.  No pneumatosis or portal venous gas. The superior mesenteric   vein and superior mesenteric artery appear patent proximally.  Findings   discussed with Dr. Carlson at1:10 am on 19 with RBV.    MAO CH M.D ATTENDING RADIOLOGIST  This document has been electronically signed. 2019  1:11AM        < end of copied text >

## 2019-07-02 LAB
ANION GAP SERPL CALC-SCNC: 10 MMOL/L — SIGNIFICANT CHANGE UP (ref 5–17)
APTT BLD: 78.8 SEC — HIGH (ref 27.5–36.3)
APTT BLD: 99.4 SEC — HIGH (ref 27.5–36.3)
BUN SERPL-MCNC: 8 MG/DL — SIGNIFICANT CHANGE UP (ref 7–23)
CALCIUM SERPL-MCNC: 8.7 MG/DL — SIGNIFICANT CHANGE UP (ref 8.4–10.5)
CHLORIDE SERPL-SCNC: 100 MMOL/L — SIGNIFICANT CHANGE UP (ref 96–108)
CO2 SERPL-SCNC: 29 MMOL/L — SIGNIFICANT CHANGE UP (ref 22–31)
CREAT SERPL-MCNC: 0.72 MG/DL — SIGNIFICANT CHANGE UP (ref 0.5–1.3)
CULTURE RESULTS: SIGNIFICANT CHANGE UP
GLUCOSE SERPL-MCNC: 140 MG/DL — HIGH (ref 70–99)
HCT VFR BLD CALC: 38.7 % — SIGNIFICANT CHANGE UP (ref 34.5–45)
HCV AB S/CO SERPL IA: 0.17 S/CO — SIGNIFICANT CHANGE UP (ref 0–0.99)
HCV AB SERPL-IMP: SIGNIFICANT CHANGE UP
HGB BLD-MCNC: 12.2 G/DL — SIGNIFICANT CHANGE UP (ref 11.5–15.5)
LACTATE SERPL-SCNC: 1.4 MMOL/L — SIGNIFICANT CHANGE UP (ref 0.7–2)
MCHC RBC-ENTMCNC: 30.7 PG — SIGNIFICANT CHANGE UP (ref 27–34)
MCHC RBC-ENTMCNC: 31.4 GM/DL — LOW (ref 32–36)
MCV RBC AUTO: 97.7 FL — SIGNIFICANT CHANGE UP (ref 80–100)
PLATELET # BLD AUTO: 337 K/UL — SIGNIFICANT CHANGE UP (ref 150–400)
POTASSIUM SERPL-MCNC: 3.7 MMOL/L — SIGNIFICANT CHANGE UP (ref 3.5–5.3)
POTASSIUM SERPL-SCNC: 3.7 MMOL/L — SIGNIFICANT CHANGE UP (ref 3.5–5.3)
RBC # BLD: 3.96 M/UL — SIGNIFICANT CHANGE UP (ref 3.8–5.2)
RBC # FLD: 11.7 % — SIGNIFICANT CHANGE UP (ref 10.3–14.5)
SODIUM SERPL-SCNC: 139 MMOL/L — SIGNIFICANT CHANGE UP (ref 135–145)
SPECIMEN SOURCE: SIGNIFICANT CHANGE UP
WBC # BLD: 10.3 K/UL — SIGNIFICANT CHANGE UP (ref 3.8–10.5)
WBC # FLD AUTO: 10.3 K/UL — SIGNIFICANT CHANGE UP (ref 3.8–10.5)

## 2019-07-02 PROCEDURE — 99232 SBSQ HOSP IP/OBS MODERATE 35: CPT

## 2019-07-02 RX ORDER — HEPARIN SODIUM 5000 [USP'U]/ML
3000 INJECTION INTRAVENOUS; SUBCUTANEOUS EVERY 6 HOURS
Refills: 0 | Status: DISCONTINUED | OUTPATIENT
Start: 2019-07-02 | End: 2019-07-02

## 2019-07-02 RX ORDER — HEPARIN SODIUM 5000 [USP'U]/ML
1700 INJECTION INTRAVENOUS; SUBCUTANEOUS
Qty: 25000 | Refills: 0 | Status: DISCONTINUED | OUTPATIENT
Start: 2019-07-02 | End: 2019-07-02

## 2019-07-02 RX ORDER — HEPARIN SODIUM 5000 [USP'U]/ML
6000 INJECTION INTRAVENOUS; SUBCUTANEOUS EVERY 6 HOURS
Refills: 0 | Status: DISCONTINUED | OUTPATIENT
Start: 2019-07-02 | End: 2019-07-02

## 2019-07-02 RX ORDER — ENOXAPARIN SODIUM 100 MG/ML
80 INJECTION SUBCUTANEOUS EVERY 12 HOURS
Refills: 0 | Status: DISCONTINUED | OUTPATIENT
Start: 2019-07-02 | End: 2019-07-05

## 2019-07-02 RX ORDER — ENOXAPARIN SODIUM 100 MG/ML
75 INJECTION SUBCUTANEOUS EVERY 12 HOURS
Refills: 0 | Status: DISCONTINUED | OUTPATIENT
Start: 2019-07-02 | End: 2019-07-02

## 2019-07-02 RX ORDER — CIPROFLOXACIN LACTATE 400MG/40ML
400 VIAL (ML) INTRAVENOUS EVERY 12 HOURS
Refills: 0 | Status: DISCONTINUED | OUTPATIENT
Start: 2019-07-02 | End: 2019-07-05

## 2019-07-02 RX ADMIN — Medication 200 MILLIGRAM(S): at 18:27

## 2019-07-02 RX ADMIN — Medication 100 MILLIGRAM(S): at 12:01

## 2019-07-02 RX ADMIN — HEPARIN SODIUM 1700 UNIT(S)/HR: 5000 INJECTION INTRAVENOUS; SUBCUTANEOUS at 08:52

## 2019-07-02 RX ADMIN — ENOXAPARIN SODIUM 80 MILLIGRAM(S): 100 INJECTION SUBCUTANEOUS at 21:47

## 2019-07-02 RX ADMIN — CEFTRIAXONE 100 MILLIGRAM(S): 500 INJECTION, POWDER, FOR SOLUTION INTRAMUSCULAR; INTRAVENOUS at 01:20

## 2019-07-02 RX ADMIN — ONDANSETRON 4 MILLIGRAM(S): 8 TABLET, FILM COATED ORAL at 12:00

## 2019-07-02 RX ADMIN — ONDANSETRON 4 MILLIGRAM(S): 8 TABLET, FILM COATED ORAL at 05:25

## 2019-07-02 RX ADMIN — Medication 10 MILLIGRAM(S): at 21:47

## 2019-07-02 RX ADMIN — ONDANSETRON 4 MILLIGRAM(S): 8 TABLET, FILM COATED ORAL at 00:01

## 2019-07-02 RX ADMIN — Medication 100 MILLIGRAM(S): at 21:48

## 2019-07-02 RX ADMIN — Medication 100 MILLIGRAM(S): at 03:04

## 2019-07-02 RX ADMIN — ATORVASTATIN CALCIUM 10 MILLIGRAM(S): 80 TABLET, FILM COATED ORAL at 21:47

## 2019-07-02 RX ADMIN — HEPARIN SODIUM 1700 UNIT(S)/HR: 5000 INJECTION INTRAVENOUS; SUBCUTANEOUS at 01:39

## 2019-07-02 RX ADMIN — PANTOPRAZOLE SODIUM 40 MILLIGRAM(S): 20 TABLET, DELAYED RELEASE ORAL at 21:47

## 2019-07-02 RX ADMIN — ONDANSETRON 4 MILLIGRAM(S): 8 TABLET, FILM COATED ORAL at 18:27

## 2019-07-02 NOTE — PROGRESS NOTE ADULT - SUBJECTIVE AND OBJECTIVE BOX
GENERAL SURGERY DAILY PROGRESS NOTE:       Subjective: Patient examined at bedside. States pain and nausea have improved, however feels bloated.           Objective:        Vital Signs Last 24 Hrs  T(C): 36.8 (2019 05:08), Max: 36.8 (2019 17:58)  T(F): 98.3 (2019 05:08), Max: 98.3 (2019 05:08)  HR: 77 (2019 05:08) (76 - 77)  BP: 100/52 (2019 05:08) (100/52 - 108/66)  BP(mean): --  RR: 18 (2019 05:08) (18 - 18)  SpO2: 94% (2019 05:08) (94% - 96%)    I&O's Detail    2019 07:01  -  2019 07:00  --------------------------------------------------------  IN:    dextrose 5% + sodium chloride 0.9%.: 1200 mL    heparin  Infusion.: 204 mL  Total IN: 1404 mL    OUT:  Total OUT: 0 mL    Total NET: 1404 mL            General: NAD, well-nourished  HEENT: Atraumatic, EOMI  Resp: Breathing comfortably on RA  CV: Normal sinus rhythm  Abd: soft, mod distension, nontender, nonperitoneal  Ext: ROMIx4, motor strength intact x 4      LABS:                        12.2   10.3  )-----------( 337      ( 2019 08:17 )             38.7     07-02    139  |  100  |  8   ----------------------------<  140<H>  3.7   |  29  |  0.72    Ca    8.7      2019 08:17  Mg     2.0     06-30    TPro  5.8<L>  /  Alb  2.4<L>  /  TBili  0.3  /  DBili  x   /  AST  19  /  ALT  19  /  AlkPhos  47  07-01    PT/INR - ( 2019 22:36 )   PT: 13.6 sec;   INR: 1.19 ratio         PTT - ( 2019 08:18 )  PTT:99.4 sec  Urinalysis Basic - ( 2019 19:41 )    Color: Light Yellow / Appearance: Clear / S.024 / pH: x  Gluc: x / Ketone: Small  / Bili: Negative / Urobili: <2 mg/dL   Blood: x / Protein: Trace / Nitrite: Negative   Leuk Esterase: Negative / RBC: x / WBC x   Sq Epi: x / Non Sq Epi: x / Bacteria: x        RADIOLOGY & ADDITIONAL STUDIES:    MEDICATIONS  (STANDING):  atorvastatin 10 milliGRAM(s) Oral at bedtime  cefTRIAXone   IVPB 1000 milliGRAM(s) IV Intermittent every 24 hours  dextrose 5% + sodium chloride 0.9%. 1000 milliLiter(s) (100 mL/Hr) IV Continuous <Continuous>  dicyclomine 10 milliGRAM(s) Oral four times a day before meals  heparin  Infusion. 1700 Unit(s)/Hr (17 mL/Hr) IV Continuous <Continuous>  metroNIDAZOLE  IVPB      metroNIDAZOLE  IVPB 500 milliGRAM(s) IV Intermittent every 8 hours  ondansetron Injectable 4 milliGRAM(s) IV Push every 6 hours  pantoprazole  Injectable 40 milliGRAM(s) IV Push at bedtime    MEDICATIONS  (PRN):  heparin  Injectable 6000 Unit(s) IV Push every 6 hours PRN For aPTT less than 40  heparin  Injectable 3000 Unit(s) IV Push every 6 hours PRN For aPTT between 40 - 57  ondansetron Injectable 4 milliGRAM(s) IV Push every 6 hours PRN Nausea and/or Vomiting GENERAL SURGERY DAILY PROGRESS NOTE:       Subjective: Patient examined at bedside. States pain and nausea have resolved, however feels bloated.           Objective:        Vital Signs Last 24 Hrs  T(C): 36.8 (2019 05:08), Max: 36.8 (2019 17:58)  T(F): 98.3 (2019 05:08), Max: 98.3 (2019 05:08)  HR: 77 (2019 05:08) (76 - 77)  BP: 100/52 (2019 05:08) (100/52 - 108/66)  BP(mean): --  RR: 18 (2019 05:08) (18 - 18)  SpO2: 94% (2019 05:08) (94% - 96%)    I&O's Detail    2019 07:01  -  2019 07:00  --------------------------------------------------------  IN:    dextrose 5% + sodium chloride 0.9%.: 1200 mL    heparin  Infusion.: 204 mL  Total IN: 1404 mL    OUT:  Total OUT: 0 mL    Total NET: 1404 mL            General: NAD, well-nourished  HEENT: Atraumatic, EOMI  Resp: Breathing comfortably on RA  CV: Normal sinus rhythm  Abd: soft, mod distension, nontender, nonperitoneal  Ext: ROMIx4, motor strength intact x 4      LABS:                        12.2   10.3  )-----------( 337      ( 2019 08:17 )             38.7     07-02    139  |  100  |  8   ----------------------------<  140<H>  3.7   |  29  |  0.72    Ca    8.7      2019 08:17  Mg     2.0     06-30    TPro  5.8<L>  /  Alb  2.4<L>  /  TBili  0.3  /  DBili  x   /  AST  19  /  ALT  19  /  AlkPhos  47  07-01    PT/INR - ( 2019 22:36 )   PT: 13.6 sec;   INR: 1.19 ratio         PTT - ( 2019 08:18 )  PTT:99.4 sec  Urinalysis Basic - ( 2019 19:41 )    Color: Light Yellow / Appearance: Clear / S.024 / pH: x  Gluc: x / Ketone: Small  / Bili: Negative / Urobili: <2 mg/dL   Blood: x / Protein: Trace / Nitrite: Negative   Leuk Esterase: Negative / RBC: x / WBC x   Sq Epi: x / Non Sq Epi: x / Bacteria: x        RADIOLOGY & ADDITIONAL STUDIES:    MEDICATIONS  (STANDING):  atorvastatin 10 milliGRAM(s) Oral at bedtime  cefTRIAXone   IVPB 1000 milliGRAM(s) IV Intermittent every 24 hours  dextrose 5% + sodium chloride 0.9%. 1000 milliLiter(s) (100 mL/Hr) IV Continuous <Continuous>  dicyclomine 10 milliGRAM(s) Oral four times a day before meals  heparin  Infusion. 1700 Unit(s)/Hr (17 mL/Hr) IV Continuous <Continuous>  metroNIDAZOLE  IVPB      metroNIDAZOLE  IVPB 500 milliGRAM(s) IV Intermittent every 8 hours  ondansetron Injectable 4 milliGRAM(s) IV Push every 6 hours  pantoprazole  Injectable 40 milliGRAM(s) IV Push at bedtime    MEDICATIONS  (PRN):  heparin  Injectable 6000 Unit(s) IV Push every 6 hours PRN For aPTT less than 40  heparin  Injectable 3000 Unit(s) IV Push every 6 hours PRN For aPTT between 40 - 57  ondansetron Injectable 4 milliGRAM(s) IV Push every 6 hours PRN Nausea and/or Vomiting

## 2019-07-02 NOTE — PROGRESS NOTE ADULT - ASSESSMENT
67 yo woman who was in her USOH developed severe RLQ and RUQ abd pain 5 days ago, associated w anorexia nausea, NO diarrhea, no vomiting, no chills, no change in BMs, saw her GI doc a day after and was prescribed CIPRO for an infection, but ptn w no diarrhea, n/v. 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 Marquez) Ptn states pain level is 10/10. CT A/P c/w severe intramural jejunal edema w mesenteric edema, suggestive of mesenteric ischemia, all mesenteric vessels are patent, seen by GI, Surgery, Heme, placed on Flagyl, IV Heparin (01 Jul 2019 12:07)    ER vss, afebrile.  WBC 10.7 --> 9.7.  UA (-) nit/LE.  CTap s/o mesenteric ischemia and possible venous clot, now on heparin gtt.  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  Pt states she has had giardia in the past, and her current symptoms are very similar to when she had giardia infection.  No change in dietary habits, no sick contacts.    Pt c/o feeling bloated, has not had a BM for 2-3 days.      ID consult called for further abx managment.      r/o infectious enteritits:    - Findings s/o mesenteric ischemia.  Thickening and edema seen in the jejunum and mesenteric edema noted on CT imaging. Pt started on heparin gtt for anticoagulation.    - r/o infectious etiologies.  Send stool cx, O&P, GI pcr, giardia Ag.  Will change to flagyl + cipro for broader coverage.  Awaiting stool studies.  Pt planned for EGD, f/u path reports.    Will follow,    Paty Bennett  [phone]

## 2019-07-02 NOTE — PROGRESS NOTE ADULT - ASSESSMENT
69 yo woman heterozygous for Factor V Leiden mutation on no ASA, AC presenting with abdominal pain, anorexia and nausea for one week, found to have jejunal thickening secondary to possible venous bowel ischemia.     Plan:   - Enteroscopy tomorrow with GI  - NPO/IVF  - Trend labs and lactate  - Serial abdominal exams  - Cont Anticoagulation  - Cont Antibiotics    VIKY Mcguire, PGY2  Acute Care Surgery, [id number]. 67 yo woman heterozygous for Factor V Leiden mutation on no ASA, AC presenting with abdominal pain, anorexia and nausea for one week, found to have jejunal thickening secondary to possible venous bowel ischemia.     Plan:   - symptoms significantly improved, now without pain, benign abdominal exam, normal WBC, normal lactate  - very unlikely that mesenteric ischemia is etiology of pain, given resolution of pain/tenderness  - diet per GI  - defer abx mgmt to GI/primary team  - defer A/C mgmt to heme/primary team  - seen and examined with Dr. Landrum  - please call with questions    VIKY Mcguire, PGY2  Acute Care Surgery, [id number].

## 2019-07-02 NOTE — PROGRESS NOTE ADULT - SUBJECTIVE AND OBJECTIVE BOX
Patient is a 68y old  Female who presents with a chief complaint of abdominal pain (2019 14:33)      SUBJECTIVE / OVERNIGHT EVENTS: abd pain and nausea resolved, still w distended abdomen, still on AC, no diarrhea passing flatus, tolerating clears, EGD tomorrow    MEDICATIONS  (STANDING):  atorvastatin 10 milliGRAM(s) Oral at bedtime  ciprofloxacin   IVPB 400 milliGRAM(s) IV Intermittent every 12 hours  dextrose 5% + sodium chloride 0.9%. 1000 milliLiter(s) (100 mL/Hr) IV Continuous <Continuous>  dicyclomine 10 milliGRAM(s) Oral four times a day before meals  enoxaparin Injectable 80 milliGRAM(s) SubCutaneous every 12 hours  heparin  Infusion. 1700 Unit(s)/Hr (17 mL/Hr) IV Continuous <Continuous>  metroNIDAZOLE  IVPB      metroNIDAZOLE  IVPB 500 milliGRAM(s) IV Intermittent every 8 hours  ondansetron Injectable 4 milliGRAM(s) IV Push every 6 hours  pantoprazole  Injectable 40 milliGRAM(s) IV Push at bedtime    MEDICATIONS  (PRN):  heparin  Injectable 6000 Unit(s) IV Push every 6 hours PRN For aPTT less than 40  heparin  Injectable 3000 Unit(s) IV Push every 6 hours PRN For aPTT between 40 - 57  ondansetron Injectable 4 milliGRAM(s) IV Push every 6 hours PRN Nausea and/or Vomiting      Vital Signs Last 24 Hrs  T(F): 98.6 (19 @ 12:41), Max: 98.6 (19 @ 12:41)  HR: 76 (19 @ 12:41) (76 - 77)  BP: 111/75 (19 @ 12:41) (100/52 - 111/75)  RR: 18 (19 @ 12:41) (18 - 18)  SpO2: 95% (19 @ 12:41) (94% - 96%)  Telemetry:   CAPILLARY BLOOD GLUCOSE        I&O's Summary    2019 07:01  -  2019 07:00  --------------------------------------------------------  IN: 1404 mL / OUT: 0 mL / NET: 1404 mL        PHYSICAL EXAM:  GENERAL: NAD, well-developed  HEAD:  Atraumatic, Normocephalic  EYES: EOMI, PERRLA, conjunctiva and sclera clear  NECK: Supple, No JVD  CHEST/LUNG: Clear to auscultation bilaterally; No wheeze  HEART: Regular rate and rhythm; No murmurs, rubs, or gallops  ABDOMEN: Soft, Nontender, Nondistended; Bowel sounds present  EXTREMITIES:  2+ Peripheral Pulses, No clubbing, cyanosis, or edema  PSYCH: AAOx3  NEUROLOGY: non-focal  SKIN: No rashes or lesions    LABS:                        12.2   10.3  )-----------( 337      ( 2019 08:17 )             38.7     07-02    139  |  100  |  8   ----------------------------<  140<H>  3.7   |  29  |  0.72    Ca    8.7      2019 08:17  Mg     2.0     06-30    TPro  5.8<L>  /  Alb  2.4<L>  /  TBili  0.3  /  DBili  x   /  AST  19  /  ALT  19  /  AlkPhos  47  07-01    PT/INR - ( 2019 22:36 )   PT: 13.6 sec;   INR: 1.19 ratio         PTT - ( 2019 08:18 )  PTT:99.4 sec      Urinalysis Basic - ( 2019 19:41 )    Color: Light Yellow / Appearance: Clear / S.024 / pH: x  Gluc: x / Ketone: Small  / Bili: Negative / Urobili: <2 mg/dL   Blood: x / Protein: Trace / Nitrite: Negative   Leuk Esterase: Negative / RBC: x / WBC x   Sq Epi: x / Non Sq Epi: x / Bacteria: x        RADIOLOGY & ADDITIONAL TESTS:    Imaging Personally Reviewed:    Consultant(s) Notes Reviewed:      Care Discussed with Consultants/Other Providers:

## 2019-07-02 NOTE — PROGRESS NOTE ADULT - SUBJECTIVE AND OBJECTIVE BOX
CARDIOLOGY FOLLOW UP - Dr. Lloyd    CC no cp or sob  c.o cough/ abd distention      PHYSICAL EXAM:  T(C): 36.8 (07-02-19 @ 05:08), Max: 36.8 (07-01-19 @ 17:58)  HR: 77 (07-02-19 @ 05:08) (76 - 77)  BP: 100/52 (07-02-19 @ 05:08) (100/52 - 108/66)  RR: 18 (07-02-19 @ 05:08) (18 - 18)  SpO2: 94% (07-02-19 @ 05:08) (94% - 96%)  Wt(kg): --  I&O's Summary    01 Jul 2019 07:01  -  02 Jul 2019 07:00  --------------------------------------------------------  IN: 1404 mL / OUT: 0 mL / NET: 1404 mL        Appearance: Normal	  Cardiovascular: Normal S1 S2,RRR, No JVD, No murmurs  Respiratory: Lungs clear to auscultation	  Gastrointestinal:  distended , Non-tender, + BS	  Extremities: Normal range of motion, No clubbing, cyanosis or edema        MEDICATIONS  (STANDING):  atorvastatin 10 milliGRAM(s) Oral at bedtime  cefTRIAXone   IVPB 1000 milliGRAM(s) IV Intermittent every 24 hours  dextrose 5% + sodium chloride 0.9%. 1000 milliLiter(s) (100 mL/Hr) IV Continuous <Continuous>  dicyclomine 10 milliGRAM(s) Oral four times a day before meals  heparin  Infusion. 1700 Unit(s)/Hr (17 mL/Hr) IV Continuous <Continuous>  metroNIDAZOLE  IVPB      metroNIDAZOLE  IVPB 500 milliGRAM(s) IV Intermittent every 8 hours  ondansetron Injectable 4 milliGRAM(s) IV Push every 6 hours  pantoprazole  Injectable 40 milliGRAM(s) IV Push at bedtime      TELEMETRY: 	    ECG:  	  RADIOLOGY:   DIAGNOSTIC TESTING:  [ ] Echocardiogram:  [ ]  Catheterization:  [ ] Stress Test:    OTHER: 	    LABS:	 	                            12.2   10.3  )-----------( 337      ( 02 Jul 2019 08:17 )             38.7     07-02    139  |  100  |  8   ----------------------------<  140<H>  3.7   |  29  |  0.72    Ca    8.7      02 Jul 2019 08:17  Mg     2.0     06-30    TPro  5.8<L>  /  Alb  2.4<L>  /  TBili  0.3  /  DBili  x   /  AST  19  /  ALT  19  /  AlkPhos  47  07-01    PT/INR - ( 30 Jun 2019 22:36 )   PT: 13.6 sec;   INR: 1.19 ratio         PTT - ( 02 Jul 2019 08:18 )  PTT:99.4 sec

## 2019-07-02 NOTE — PROGRESS NOTE ADULT - SUBJECTIVE AND OBJECTIVE BOX
Covering for OLEG Ramos  MRN-[id number]    Patient is a 68y old  Female who presents with a chief complaint of abdominal pain (02 Jul 2019 07:18)      Review of System      General:	Denies fatigue, fevers, chills, sweats, decreased appetite.    Skin/Breast: denies pruritis, rash  	  Ophthalmologic: no change in vision or blurring  	  HEENT	Denies dry mouth, oral sores, dysphagia,  change in hearing.    Respiratory and Thorax:  reports cough over last 6 weeks. also with some pleuritic left sided cp  	  Cardiovascular:	no cp , palp, orthopnea    Gastrointestinal:	no n/v/d constipation. bloating and distention    Genitourinary:	no dysuria of frequency, no hematuria, no flank pain    Musculoskeletal:	no bone or joint pain. no muscle aches.     Neurological:	no change in sensory or motor function. no headache. no weakness.     Psychiatric:	no depression, no anxiety, insomnia.     Hematology/Lymphatics:	no bleeding or bruising        Current Meds  MEDICATIONS  (STANDING):  atorvastatin 10 milliGRAM(s) Oral at bedtime  cefTRIAXone   IVPB 1000 milliGRAM(s) IV Intermittent every 24 hours  dextrose 5% + sodium chloride 0.9%. 1000 milliLiter(s) (100 mL/Hr) IV Continuous <Continuous>  dicyclomine 10 milliGRAM(s) Oral four times a day before meals  heparin  Infusion. 1700 Unit(s)/Hr (17 mL/Hr) IV Continuous <Continuous>  metroNIDAZOLE  IVPB      metroNIDAZOLE  IVPB 500 milliGRAM(s) IV Intermittent every 8 hours  ondansetron Injectable 4 milliGRAM(s) IV Push every 6 hours  pantoprazole  Injectable 40 milliGRAM(s) IV Push at bedtime    MEDICATIONS  (PRN):  heparin  Injectable 6000 Unit(s) IV Push every 6 hours PRN For aPTT less than 40  heparin  Injectable 3000 Unit(s) IV Push every 6 hours PRN For aPTT between 40 - 57  ondansetron Injectable 4 milliGRAM(s) IV Push every 6 hours PRN Nausea and/or Vomiting        Vital Signs Last 24 Hrs  T(C): 36.8 (02 Jul 2019 05:08), Max: 36.9 (01 Jul 2019 10:21)  T(F): 98.3 (02 Jul 2019 05:08), Max: 98.4 (01 Jul 2019 10:21)  HR: 77 (02 Jul 2019 05:08) (76 - 85)  BP: 100/52 (02 Jul 2019 05:08) (100/52 - 137/76)  BP(mean): --  RR: 18 (02 Jul 2019 05:08) (16 - 20)  SpO2: 94% (02 Jul 2019 05:08) (94% - 98%)    Physical Exam  PHYSICAL EXAM:      Constitutional: NAD    Eyes: PERRLA EOMI, anicteric sclera    Heent :No oral sores, no pharyngeal injection. moist mucosa.    Neck: supple, no jvd, no LAD    Respiratory: CTA b/l     Cardiovascular: s1s2, no m/g/r    Gastrointestinal: soft, nt, nd, + BS    Extremities: no c/c/e    Neurological:A&O x 3 moves all ext.    Skin: no rash on exposed skin    Lymph Nodes: no lymphadenopathy.      Lab  CBC Full  -  ( 01 Jul 2019 20:05 )  WBC Count : 10.0 K/uL  RBC Count : 3.89 M/uL  Hemoglobin : 12.6 g/dL  Hematocrit : 38.2 %  Platelet Count - Automated : 327 K/uL  Mean Cell Volume : 98.1 fl  Mean Cell Hemoglobin : 32.5 pg  Mean Cell Hemoglobin Concentration : 33.1 gm/dL  Auto Neutrophil # : x  Auto Lymphocyte # : x  Auto Monocyte # : x  Auto Eosinophil # : x  Auto Basophil # : x  Auto Neutrophil % : x  Auto Lymphocyte % : x  Auto Monocyte % : x  Auto Eosinophil % : x  Auto Basophil % : x    07-01    136  |  101  |  8   ----------------------------<  147<H>  3.6   |  25  |  0.67    Ca    8.8      01 Jul 2019 20:05  Mg     2.0     06-30    TPro  5.8<L>  /  Alb  2.4<L>  /  TBili  0.3  /  DBili  x   /  AST  19  /  ALT  19  /  AlkPhos  47  07-01    PT/INR - ( 30 Jun 2019 22:36 )   PT: 13.6 sec;   INR: 1.19 ratio         PTT - ( 02 Jul 2019 00:28 )  PTT:78.8 sec    Rad:    Assessment/Plan

## 2019-07-02 NOTE — PROGRESS NOTE ADULT - SUBJECTIVE AND OBJECTIVE BOX
OLEG LEO:[id number],   68yFemale followed for:  codeine (Hives; Anaphylaxis)  Dilaudid (Unknown)  fentanyl (Pruritus; Urticaria)  iodine (Anaphylaxis)    PAST MEDICAL & SURGICAL HISTORY:  Insomnia  Gastroesophageal reflux disease with esophagitis  History of osteoarthritis  Hypercholesteremia  Factor V Leiden  S/P LASIK surgery of both eyes  Intrauterine polyp  S/P D&C (status post dilation and curettage)  S/P hip replacement, right    FAMILY HISTORY:  Family history of MI (myocardial infarction)  Family history of factor V Leiden mutation: osteoarthritis    MEDICATIONS  (STANDING):  atorvastatin 10 milliGRAM(s) Oral at bedtime  cefTRIAXone   IVPB 1000 milliGRAM(s) IV Intermittent every 24 hours  dextrose 5% + sodium chloride 0.9%. 1000 milliLiter(s) (100 mL/Hr) IV Continuous <Continuous>  dicyclomine 10 milliGRAM(s) Oral four times a day before meals  heparin  Infusion. 1700 Unit(s)/Hr (17 mL/Hr) IV Continuous <Continuous>  metroNIDAZOLE  IVPB      metroNIDAZOLE  IVPB 500 milliGRAM(s) IV Intermittent every 8 hours  ondansetron Injectable 4 milliGRAM(s) IV Push every 6 hours  pantoprazole  Injectable 40 milliGRAM(s) IV Push at bedtime    MEDICATIONS  (PRN):  heparin  Injectable 6000 Unit(s) IV Push every 6 hours PRN For aPTT less than 40  heparin  Injectable 3000 Unit(s) IV Push every 6 hours PRN For aPTT between 40 - 57  ondansetron Injectable 4 milliGRAM(s) IV Push every 6 hours PRN Nausea and/or Vomiting      Vital Signs Last 24 Hrs  T(C): 36.8 (02 Jul 2019 05:08), Max: 36.9 (01 Jul 2019 10:21)  T(F): 98.3 (02 Jul 2019 05:08), Max: 98.4 (01 Jul 2019 10:21)  HR: 77 (02 Jul 2019 05:08) (76 - 85)  BP: 100/52 (02 Jul 2019 05:08) (100/52 - 137/76)  BP(mean): --  RR: 18 (02 Jul 2019 05:08) (16 - 20)  SpO2: 94% (02 Jul 2019 05:08) (94% - 98%)  nc/at  s1s2  cta  soft, nt, nd no guarding or rebound  no c/c/e    CBC Full  -  ( 01 Jul 2019 20:05 )  WBC Count : 10.0 K/uL  RBC Count : 3.89 M/uL  Hemoglobin : 12.6 g/dL  Hematocrit : 38.2 %  Platelet Count - Automated : 327 K/uL  Mean Cell Volume : 98.1 fl  Mean Cell Hemoglobin : 32.5 pg  Mean Cell Hemoglobin Concentration : 33.1 gm/dL  Auto Neutrophil # : x  Auto Lymphocyte # : x  Auto Monocyte # : x  Auto Eosinophil # : x  Auto Basophil # : x  Auto Neutrophil % : x  Auto Lymphocyte % : x  Auto Monocyte % : x  Auto Eosinophil % : x  Auto Basophil % : x    07-01    136  |  101  |  8   ----------------------------<  147<H>  3.6   |  25  |  0.67    Ca    8.8      01 Jul 2019 20:05  Mg     2.0     06-30    TPro  5.8<L>  /  Alb  2.4<L>  /  TBili  0.3  /  DBili  x   /  AST  19  /  ALT  19  /  AlkPhos  47  07-01    PT/INR - ( 30 Jun 2019 22:36 )   PT: 13.6 sec;   INR: 1.19 ratio         PTT - ( 02 Jul 2019 00:28 )  PTT:78.8 sec

## 2019-07-02 NOTE — PROGRESS NOTE ADULT - ASSESSMENT
69 y/o woman with h/o Factor V leiden. No h/o thrombosis.  Patient on chronic advil.  Now with possible mesenteric ischemia, possible infectious colitis. Heparin and abx started.   Gi and surgery seeing patient. ID also consulted.  No hematologic objection to use of AC     Patient reports cough in recent weeks. also with some pleuritic discomfort.  Will order CTA      Patient is known to Dr. Pemberton who asked that i see patient again today

## 2019-07-02 NOTE — PROGRESS NOTE ADULT - ASSESSMENT
69 yo woman w acute abd pain started on 6/26, no diarrhea, CT findings c/w jejunal inflammation, not clear why, no sign of vascular clot, possible mesenteric ischemia, seen by heme and GI and due to h/o being possibly hypercoagulable ( heterozygous factor V defficiency) ptn is on HEPARIN drip, 24 hrs into the treatment course pain gone, nausea gone, awaiting EGD in am  will switch to sc lovenox to cont full AC though doubt this is vascular driven. will hold lovenox in am while awaiting Endoscopy tomorrow.   cont empiric ABx. cont on clears,

## 2019-07-02 NOTE — PROGRESS NOTE ADULT - SUBJECTIVE AND OBJECTIVE BOX
Infectious Diseases progress note:    Subjective: No acute o/n events.  Resting comfortably.  Afebrile    ROS:  CONSTITUTIONAL:  No fever, chills, rigors  CARDIOVASCULAR:  No chest pain or palpitations  RESPIRATORY:   No SOB, cough, dyspnea on exertion.  No wheezing  GASTROINTESTINAL:  No abd pain, N/V, diarrhea/constipation  EXTREMITIES:  No swelling or joint pain  GENITOURINARY:  No burning on urination, increased frequency or urgency.  No flank pain  NEUROLOGIC:  No HA, visual disturbances  SKIN: No rashes    Allergies    codeine (Hives; Anaphylaxis)  Dilaudid (Unknown)  fentanyl (Pruritus; Urticaria)  iodine (Anaphylaxis)    Intolerances        ANTIBIOTICS/RELEVANT:  antimicrobials  cefTRIAXone   IVPB 1000 milliGRAM(s) IV Intermittent every 24 hours  metroNIDAZOLE  IVPB      metroNIDAZOLE  IVPB 500 milliGRAM(s) IV Intermittent every 8 hours    immunologic:    OTHER:  atorvastatin 10 milliGRAM(s) Oral at bedtime  dextrose 5% + sodium chloride 0.9%. 1000 milliLiter(s) IV Continuous <Continuous>  dicyclomine 10 milliGRAM(s) Oral four times a day before meals  enoxaparin Injectable 80 milliGRAM(s) SubCutaneous every 12 hours  heparin  Infusion. 1700 Unit(s)/Hr IV Continuous <Continuous>  heparin  Injectable 6000 Unit(s) IV Push every 6 hours PRN  heparin  Injectable 3000 Unit(s) IV Push every 6 hours PRN  ondansetron Injectable 4 milliGRAM(s) IV Push every 6 hours PRN  ondansetron Injectable 4 milliGRAM(s) IV Push every 6 hours  pantoprazole  Injectable 40 milliGRAM(s) IV Push at bedtime      Objective:  Vital Signs Last 24 Hrs  T(C): 37 (2019 12:41), Max: 37 (2019 12:41)  T(F): 98.6 (2019 12:41), Max: 98.6 (2019 12:41)  HR: 76 (2019 12:41) (76 - 77)  BP: 111/75 (2019 12:41) (100/52 - 111/75)  BP(mean): --  RR: 18 (2019 12:41) (18 - 18)  SpO2: 95% (2019 12:41) (94% - 96%)    PHYSICAL EXAM:  Constitutional:NAD  Eyes:MARTA, EOMI  Ear/Nose/Throat: no thrush, mucositis.  Moist mucous membranes	  Neck:no JVD, no lymphadenopathy, supple  Respiratory: CTA eri  Cardiovascular: S1S2 RRR, no murmurs  Gastrointestinal:soft, nontender,  nondistended (+) BS  Extremities:no e/e/c  Skin:  no rashes, open wounds or ulcerations        LABS:                        12.2   10.3  )-----------( 337      ( 2019 08:17 )             38.7     07-    139  |  100  |  8   ----------------------------<  140<H>  3.7   |  29  |  0.72    Ca    8.7      2019 08:17  Mg     2.0     06-30    TPro  5.8<L>  /  Alb  2.4<L>  /  TBili  0.3  /  DBili  x   /  AST  19  /  ALT  19  /  AlkPhos  47  07-    PT/INR - ( 2019 22:36 )   PT: 13.6 sec;   INR: 1.19 ratio         PTT - ( 2019 08:18 )  PTT:99.4 sec  Urinalysis Basic - ( 2019 19:41 )    Color: Light Yellow / Appearance: Clear / S.024 / pH: x  Gluc: x / Ketone: Small  / Bili: Negative / Urobili: <2 mg/dL   Blood: x / Protein: Trace / Nitrite: Negative   Leuk Esterase: Negative / RBC: x / WBC x   Sq Epi: x / Non Sq Epi: x / Bacteria: x                          MICROBIOLOGY:    Culture - Urine (19 @ 06:03)    Specimen Source: .Urine    Culture Results:   <10,000 CFU/mL Normal Urogenital Jana          RADIOLOGY & ADDITIONAL STUDIES:    < from: Xray Abdomen 2 Views (19 @ 22:29) >  PROCEDURE DATE:  2019      ******PRELIMINARY REPORT******    ******PRELIMINARY REPORT******              INTERPRETATION: no emergent findings    < end of copied text >

## 2019-07-02 NOTE — PROGRESS NOTE ADULT - ASSESSMENT
68 year old female with  h/o Factor V leiden, HLD, Gerd, presenting with abd pain     1. Abdominal Pain   CT a/p revealed  jejunal thickening secondary to possible venous bowel ischemia.   surgery team f/u , no acute surgical intervention , on abx   NPO / IVF  plan for Enteroscopy tomorrow with GI, pt can proceed with low cv risk  GI f/u     2. Factor V leiden  a/c per hem/onc   med f/u     3. pleuritic cp, cough  cv stable  no chest pain or sob. no decomp chf on exam  check ekg   pending ct chest     dvt ppx

## 2019-07-03 ENCOUNTER — RESULT REVIEW (OUTPATIENT)
Age: 69
End: 2019-07-03

## 2019-07-03 LAB
CULTURE RESULTS: SIGNIFICANT CHANGE UP
CULTURE RESULTS: SIGNIFICANT CHANGE UP
HCT VFR BLD CALC: 36.9 % — SIGNIFICANT CHANGE UP (ref 34.5–45)
HGB BLD-MCNC: 11.7 G/DL — SIGNIFICANT CHANGE UP (ref 11.5–15.5)
MCHC RBC-ENTMCNC: 31.1 PG — SIGNIFICANT CHANGE UP (ref 27–34)
MCHC RBC-ENTMCNC: 31.7 GM/DL — LOW (ref 32–36)
MCV RBC AUTO: 98.1 FL — SIGNIFICANT CHANGE UP (ref 80–100)
PLATELET # BLD AUTO: 394 K/UL — SIGNIFICANT CHANGE UP (ref 150–400)
RBC # BLD: 3.76 M/UL — LOW (ref 3.8–5.2)
RBC # FLD: 12.9 % — SIGNIFICANT CHANGE UP (ref 10.3–14.5)
SPECIMEN SOURCE: SIGNIFICANT CHANGE UP
SPECIMEN SOURCE: SIGNIFICANT CHANGE UP
WBC # BLD: 7.34 K/UL — SIGNIFICANT CHANGE UP (ref 3.8–10.5)
WBC # FLD AUTO: 7.34 K/UL — SIGNIFICANT CHANGE UP (ref 3.8–10.5)

## 2019-07-03 PROCEDURE — 74182 MRI ABDOMEN W/CONTRAST: CPT | Mod: 26

## 2019-07-03 PROCEDURE — 88305 TISSUE EXAM BY PATHOLOGIST: CPT | Mod: 26

## 2019-07-03 RX ADMIN — ONDANSETRON 4 MILLIGRAM(S): 8 TABLET, FILM COATED ORAL at 05:56

## 2019-07-03 RX ADMIN — Medication 10 MILLIGRAM(S): at 18:16

## 2019-07-03 RX ADMIN — Medication 100 MILLIGRAM(S): at 05:19

## 2019-07-03 RX ADMIN — ONDANSETRON 4 MILLIGRAM(S): 8 TABLET, FILM COATED ORAL at 12:11

## 2019-07-03 RX ADMIN — Medication 200 MILLIGRAM(S): at 18:16

## 2019-07-03 RX ADMIN — Medication 10 MILLIGRAM(S): at 21:55

## 2019-07-03 RX ADMIN — Medication 200 MILLIGRAM(S): at 05:19

## 2019-07-03 RX ADMIN — Medication 100 MILLIGRAM(S): at 21:54

## 2019-07-03 RX ADMIN — ENOXAPARIN SODIUM 80 MILLIGRAM(S): 100 INJECTION SUBCUTANEOUS at 18:22

## 2019-07-03 RX ADMIN — ATORVASTATIN CALCIUM 10 MILLIGRAM(S): 80 TABLET, FILM COATED ORAL at 21:55

## 2019-07-03 RX ADMIN — Medication 10 MILLIGRAM(S): at 12:10

## 2019-07-03 RX ADMIN — PANTOPRAZOLE SODIUM 40 MILLIGRAM(S): 20 TABLET, DELAYED RELEASE ORAL at 21:55

## 2019-07-03 RX ADMIN — Medication 100 MILLIGRAM(S): at 12:10

## 2019-07-03 RX ADMIN — ONDANSETRON 4 MILLIGRAM(S): 8 TABLET, FILM COATED ORAL at 18:16

## 2019-07-03 NOTE — PROGRESS NOTE ADULT - SUBJECTIVE AND OBJECTIVE BOX
Patient is a 68y old  Female who presents with a chief complaint of abdominal pain (2019 11:21)      SUBJECTIVE / OVERNIGHT EVENTS: still w abd distention, passing flatus, s/p EGD , c/w severe jejunal  wall edema, sent for pathology, concerning for venous clot, awaiting MRV    MEDICATIONS  (STANDING):  atorvastatin 10 milliGRAM(s) Oral at bedtime  ciprofloxacin   IVPB 400 milliGRAM(s) IV Intermittent every 12 hours  dextrose 5% + sodium chloride 0.9%. 1000 milliLiter(s) (100 mL/Hr) IV Continuous <Continuous>  dicyclomine 10 milliGRAM(s) Oral four times a day before meals  enoxaparin Injectable 80 milliGRAM(s) SubCutaneous every 12 hours  metroNIDAZOLE  IVPB      metroNIDAZOLE  IVPB 500 milliGRAM(s) IV Intermittent every 8 hours  ondansetron Injectable 4 milliGRAM(s) IV Push every 6 hours  pantoprazole  Injectable 40 milliGRAM(s) IV Push at bedtime    MEDICATIONS  (PRN):  ondansetron Injectable 4 milliGRAM(s) IV Push every 6 hours PRN Nausea and/or Vomiting      Vital Signs Last 24 Hrs  T(F): 98.3 (19 @ 10:53), Max: 99.1 (19 @ 22:08)  HR: 73 (19 @ 10:53) (73 - 76)  BP: 122/78 (19 @ 10:53) (102/68 - 122/78)  RR: 18 (19 @ 10:53) (18 - 18)  SpO2: 96% (19 @ 10:53) (93% - 96%)  Telemetry:   CAPILLARY BLOOD GLUCOSE        I&O's Summary      PHYSICAL EXAM:  GENERAL: NAD, well-developed  HEAD:  Atraumatic, Normocephalic  EYES: EOMI, PERRLA, conjunctiva and sclera clear  NECK: Supple, No JVD  CHEST/LUNG: Clear to auscultation bilaterally; No wheeze  HEART: Regular rate and rhythm; No murmurs, rubs, or gallops  ABDOMEN: Soft, Nontender, Nondistended; Bowel sounds present  EXTREMITIES:  2+ Peripheral Pulses, No clubbing, cyanosis, or edema  PSYCH: AAOx3  NEUROLOGY: non-focal  SKIN: No rashes or lesions    LABS:                        11.7   7.34  )-----------( 394      ( 2019 12:07 )             36.9     07-02    139  |  100  |  8   ----------------------------<  140<H>  3.7   |  29  |  0.72    Ca    8.7      2019 08:17    TPro  5.8<L>  /  Alb  2.4<L>  /  TBili  0.3  /  DBili  x   /  AST  19  /  ALT  19  /  AlkPhos  47  07-    PTT - ( 2019 08:18 )  PTT:99.4 sec      Urinalysis Basic - ( 2019 19:41 )    Color: Light Yellow / Appearance: Clear / S.024 / pH: x  Gluc: x / Ketone: Small  / Bili: Negative / Urobili: <2 mg/dL   Blood: x / Protein: Trace / Nitrite: Negative   Leuk Esterase: Negative / RBC: x / WBC x   Sq Epi: x / Non Sq Epi: x / Bacteria: x        RADIOLOGY & ADDITIONAL TESTS:    Imaging Personally Reviewed:    Consultant(s) Notes Reviewed:      Care Discussed with Consultants/Other Providers:

## 2019-07-03 NOTE — PROGRESS NOTE ADULT - SUBJECTIVE AND OBJECTIVE BOX
Infectious Diseases progress note:    Subjective: Still feels bloated.  s/p EGD, found to have esophageal edema.  Denies diarhea, n/v, no fever/chills/rash/joint pain/cough.     ROS:  CONSTITUTIONAL:  No fever, chills, rigors  CARDIOVASCULAR:  No chest pain or palpitations  RESPIRATORY:   No SOB, cough, dyspnea on exertion.  No wheezing  GASTROINTESTINAL:  No abd pain, N/V, diarrhea/constipation  EXTREMITIES:  No swelling or joint pain  GENITOURINARY:  No burning on urination, increased frequency or urgency.  No flank pain  NEUROLOGIC:  No HA, visual disturbances  SKIN: No rashes    Allergies    codeine (Hives; Anaphylaxis)  Dilaudid (Unknown)  fentanyl (Pruritus; Urticaria)  iodine (Anaphylaxis)    Intolerances        ANTIBIOTICS/RELEVANT:  antimicrobials  ciprofloxacin   IVPB 400 milliGRAM(s) IV Intermittent every 12 hours  metroNIDAZOLE  IVPB      metroNIDAZOLE  IVPB 500 milliGRAM(s) IV Intermittent every 8 hours    immunologic:    OTHER:  atorvastatin 10 milliGRAM(s) Oral at bedtime  dextrose 5% + sodium chloride 0.9%. 1000 milliLiter(s) IV Continuous <Continuous>  dicyclomine 10 milliGRAM(s) Oral four times a day before meals  enoxaparin Injectable 80 milliGRAM(s) SubCutaneous every 12 hours  ondansetron Injectable 4 milliGRAM(s) IV Push every 6 hours PRN  ondansetron Injectable 4 milliGRAM(s) IV Push every 6 hours  pantoprazole  Injectable 40 milliGRAM(s) IV Push at bedtime      Objective:  Vital Signs Last 24 Hrs  T(C): 36.8 (2019 10:53), Max: 37.3 (2019 22:08)  T(F): 98.3 (2019 10:53), Max: 99.1 (2019 22:08)  HR: 73 (2019 10:53) (73 - 76)  BP: 122/78 (2019 10:53) (102/68 - 122/78)  BP(mean): --  RR: 18 (2019 10:53) (18 - 18)  SpO2: 96% (2019 10:53) (93% - 96%)    PHYSICAL EXAM:  Constitutional:NAD  Eyes:MARTA, EOMI  Ear/Nose/Throat: no thrush, mucositis.  Moist mucous membranes	  Neck:no JVD, no lymphadenopathy, supple  Respiratory: CTA eri  Cardiovascular: S1S2 RRR, no murmurs  Gastrointestinal:soft, nontender,  mild distention (+) BS  Extremities:no e/e/c  Skin:  no rashes, open wounds or ulcerations        LABS:                        11.7   7.34  )-----------( 394      ( 2019 12:07 )             36.9         139  |  100  |  8   ----------------------------<  140<H>  3.7   |  29  |  0.72    Ca    8.7      2019 08:17    TPro  5.8<L>  /  Alb  2.4<L>  /  TBili  0.3  /  DBili  x   /  AST  19  /  ALT  19  /  AlkPhos  47  07-    PTT - ( 2019 08:18 )  PTT:99.4 sec  Urinalysis Basic - ( 2019 19:41 )    Color: Light Yellow / Appearance: Clear / S.024 / pH: x  Gluc: x / Ketone: Small  / Bili: Negative / Urobili: <2 mg/dL   Blood: x / Protein: Trace / Nitrite: Negative   Leuk Esterase: Negative / RBC: x / WBC x   Sq Epi: x / Non Sq Epi: x / Bacteria: x                          MICROBIOLOGY:  Culture Results:   GI PCR Results: NOT detected  *******Please Note:*******  GI panel PCR evaluates for:  Campylobacter, Plesiomonas shigelloides, Salmonella,  Vibrio, Yersinia enterocolitica, Enteroaggregative  Escherichia coli (EAEC), Enteropathogenic E.coli (EPEC),  Enterotoxigenic E. coli (ETEC) lt/st, Shiga-like  toxin-producing E. coli (STEC) stx1/stx2,  Shigella/ Enteroinvasive E. coli (EIEC), Cryptosporidium,  Cyclospora cayetanensis, Entamoeba histolytica,  Giardia lamblia, Adenovirus F 40/41, Astrovirus,  Norovirus GI/GII, Rotavirus A, Sapovirus ( @ 07:04)  Culture Results:   Few Yeast  No enteric gram negative rods isolated  No enteric pathogens to date: Final culture pending ( @ 19:10)  Culture Results:   Giardia antigen negative performed by rapid immunoassay (non-enzymatic).  (It is recommended that all specimens tested by  an immunochromatographic card test be  confirmed by another method.) ( @ 19:09)  Culture Results:   Testing in progress ( @ 19:06)      Ova and Parasites (19 @ 19:06)    Culture Results:   Testing in progress    Culture - Stool (19 @ 19:10)    Specimen Source: .Stool    Culture Results:   Few Yeast  No enteric gram negative rods isolated  No enteric pathogens to date: Final culture pending    Culture - Urine (19 @ 06:03)    Specimen Source: .Urine    Culture Results:   <10,000 CFU/mL Normal Urogenital Jana        RADIOLOGY & ADDITIONAL STUDIES: Infectious Diseases progress note:    Subjective: Still feels bloated.  s/p EGD, found to have jejunal edema.  Denies diarhea, n/v, no fever/chills/rash/joint pain/cough.     ROS:  CONSTITUTIONAL:  No fever, chills, rigors  CARDIOVASCULAR:  No chest pain or palpitations  RESPIRATORY:   No SOB, cough, dyspnea on exertion.  No wheezing  GASTROINTESTINAL:  No abd pain, N/V, diarrhea/constipation  EXTREMITIES:  No swelling or joint pain  GENITOURINARY:  No burning on urination, increased frequency or urgency.  No flank pain  NEUROLOGIC:  No HA, visual disturbances  SKIN: No rashes    Allergies    codeine (Hives; Anaphylaxis)  Dilaudid (Unknown)  fentanyl (Pruritus; Urticaria)  iodine (Anaphylaxis)    Intolerances        ANTIBIOTICS/RELEVANT:  antimicrobials  ciprofloxacin   IVPB 400 milliGRAM(s) IV Intermittent every 12 hours  metroNIDAZOLE  IVPB      metroNIDAZOLE  IVPB 500 milliGRAM(s) IV Intermittent every 8 hours    immunologic:    OTHER:  atorvastatin 10 milliGRAM(s) Oral at bedtime  dextrose 5% + sodium chloride 0.9%. 1000 milliLiter(s) IV Continuous <Continuous>  dicyclomine 10 milliGRAM(s) Oral four times a day before meals  enoxaparin Injectable 80 milliGRAM(s) SubCutaneous every 12 hours  ondansetron Injectable 4 milliGRAM(s) IV Push every 6 hours PRN  ondansetron Injectable 4 milliGRAM(s) IV Push every 6 hours  pantoprazole  Injectable 40 milliGRAM(s) IV Push at bedtime      Objective:  Vital Signs Last 24 Hrs  T(C): 36.8 (2019 10:53), Max: 37.3 (2019 22:08)  T(F): 98.3 (2019 10:53), Max: 99.1 (2019 22:08)  HR: 73 (2019 10:53) (73 - 76)  BP: 122/78 (2019 10:53) (102/68 - 122/78)  BP(mean): --  RR: 18 (2019 10:53) (18 - 18)  SpO2: 96% (2019 10:53) (93% - 96%)    PHYSICAL EXAM:  Constitutional:NAD  Eyes:MARTA, EOMI  Ear/Nose/Throat: no thrush, mucositis.  Moist mucous membranes	  Neck:no JVD, no lymphadenopathy, supple  Respiratory: CTA eri  Cardiovascular: S1S2 RRR, no murmurs  Gastrointestinal:soft, nontender,  mild distention (+) BS  Extremities:no e/e/c  Skin:  no rashes, open wounds or ulcerations        LABS:                        11.7   7.34  )-----------( 394      ( 2019 12:07 )             36.9         139  |  100  |  8   ----------------------------<  140<H>  3.7   |  29  |  0.72    Ca    8.7      2019 08:17    TPro  5.8<L>  /  Alb  2.4<L>  /  TBili  0.3  /  DBili  x   /  AST  19  /  ALT  19  /  AlkPhos  47  07-    PTT - ( 2019 08:18 )  PTT:99.4 sec  Urinalysis Basic - ( 2019 19:41 )    Color: Light Yellow / Appearance: Clear / S.024 / pH: x  Gluc: x / Ketone: Small  / Bili: Negative / Urobili: <2 mg/dL   Blood: x / Protein: Trace / Nitrite: Negative   Leuk Esterase: Negative / RBC: x / WBC x   Sq Epi: x / Non Sq Epi: x / Bacteria: x                          MICROBIOLOGY:  Culture Results:   GI PCR Results: NOT detected  *******Please Note:*******  GI panel PCR evaluates for:  Campylobacter, Plesiomonas shigelloides, Salmonella,  Vibrio, Yersinia enterocolitica, Enteroaggregative  Escherichia coli (EAEC), Enteropathogenic E.coli (EPEC),  Enterotoxigenic E. coli (ETEC) lt/st, Shiga-like  toxin-producing E. coli (STEC) stx1/stx2,  Shigella/ Enteroinvasive E. coli (EIEC), Cryptosporidium,  Cyclospora cayetanensis, Entamoeba histolytica,  Giardia lamblia, Adenovirus F 40/41, Astrovirus,  Norovirus GI/GII, Rotavirus A, Sapovirus ( @ 07:04)  Culture Results:   Few Yeast  No enteric gram negative rods isolated  No enteric pathogens to date: Final culture pending ( @ 19:10)  Culture Results:   Giardia antigen negative performed by rapid immunoassay (non-enzymatic).  (It is recommended that all specimens tested by  an immunochromatographic card test be  confirmed by another method.) ( @ 19:09)  Culture Results:   Testing in progress ( @ 19:06)      Ova and Parasites (19 @ 19:06)    Culture Results:   Testing in progress    Culture - Stool (19 @ 19:10)    Specimen Source: .Stool    Culture Results:   Few Yeast  No enteric gram negative rods isolated  No enteric pathogens to date: Final culture pending    Culture - Urine (19 @ 06:03)    Specimen Source: .Urine    Culture Results:   <10,000 CFU/mL Normal Urogenital Jana        RADIOLOGY & ADDITIONAL STUDIES:

## 2019-07-03 NOTE — PROGRESS NOTE ADULT - ASSESSMENT
68 year old female with  h/o Factor V leiden, HLD, Gerd, presenting with abd pain     1. Abdominal Pain   CT a/p revealed  jejunal thickening secondary to possible venous bowel ischemia.   surgery team f/u , no acute surgical intervention , on abx   s/p  Enteroscopy revealed significant edema, awaiting path results   GI f/u , pending MRI abd    2. Factor V leiden  a/c per hem/onc   med f/u     3. pleuritic cp, cough  cv stable  no chest pain or sob. no decomp chf on exam  pending ekg   pt ref ct chest     dvt ppx

## 2019-07-03 NOTE — PROGRESS NOTE ADULT - ASSESSMENT
very edematous small bowel on enterosopcy, biopsies taken, goes more with congestion secondary to venous etiology than small bowel vasculitis or infection which would give more mucosal inflammation (there is none)

## 2019-07-03 NOTE — PROGRESS NOTE ADULT - SUBJECTIVE AND OBJECTIVE BOX
OLEG LEO:[id number],   68yFemale followed for:  codeine (Hives; Anaphylaxis)  Dilaudid (Unknown)  fentanyl (Pruritus; Urticaria)  iodine (Anaphylaxis)    PAST MEDICAL & SURGICAL HISTORY:  Insomnia  Gastroesophageal reflux disease with esophagitis  History of osteoarthritis  Hypercholesteremia  Factor V Leiden  S/P LASIK surgery of both eyes  Intrauterine polyp  S/P D&C (status post dilation and curettage)  S/P hip replacement, right    FAMILY HISTORY:  Family history of MI (myocardial infarction)  Family history of factor V Leiden mutation: osteoarthritis    MEDICATIONS  (STANDING):  atorvastatin 10 milliGRAM(s) Oral at bedtime  ciprofloxacin   IVPB 400 milliGRAM(s) IV Intermittent every 12 hours  dextrose 5% + sodium chloride 0.9%. 1000 milliLiter(s) (100 mL/Hr) IV Continuous <Continuous>  dicyclomine 10 milliGRAM(s) Oral four times a day before meals  enoxaparin Injectable 80 milliGRAM(s) SubCutaneous every 12 hours  metroNIDAZOLE  IVPB      metroNIDAZOLE  IVPB 500 milliGRAM(s) IV Intermittent every 8 hours  ondansetron Injectable 4 milliGRAM(s) IV Push every 6 hours  pantoprazole  Injectable 40 milliGRAM(s) IV Push at bedtime    MEDICATIONS  (PRN):  ondansetron Injectable 4 milliGRAM(s) IV Push every 6 hours PRN Nausea and/or Vomiting      Vital Signs Last 24 Hrs  T(C): 36.9 (03 Jul 2019 04:30), Max: 37.3 (02 Jul 2019 22:08)  T(F): 98.5 (03 Jul 2019 04:30), Max: 99.1 (02 Jul 2019 22:08)  HR: 76 (03 Jul 2019 04:30) (76 - 76)  BP: 115/74 (03 Jul 2019 04:30) (102/68 - 115/74)  BP(mean): --  RR: 18 (03 Jul 2019 04:30) (18 - 18)  SpO2: 93% (03 Jul 2019 04:30) (93% - 95%)  nc/at  s1s2  cta  soft, nt, nd no guarding or rebound  no c/c/e    CBC Full  -  ( 02 Jul 2019 08:17 )  WBC Count : 10.3 K/uL  RBC Count : 3.96 M/uL  Hemoglobin : 12.2 g/dL  Hematocrit : 38.7 %  Platelet Count - Automated : 337 K/uL  Mean Cell Volume : 97.7 fl  Mean Cell Hemoglobin : 30.7 pg  Mean Cell Hemoglobin Concentration : 31.4 gm/dL  Auto Neutrophil # : x  Auto Lymphocyte # : x  Auto Monocyte # : x  Auto Eosinophil # : x  Auto Basophil # : x  Auto Neutrophil % : x  Auto Lymphocyte % : x  Auto Monocyte % : x  Auto Eosinophil % : x  Auto Basophil % : x    07-02    139  |  100  |  8   ----------------------------<  140<H>  3.7   |  29  |  0.72    Ca    8.7      02 Jul 2019 08:17    TPro  5.8<L>  /  Alb  2.4<L>  /  TBili  0.3  /  DBili  x   /  AST  19  /  ALT  19  /  AlkPhos  47  07-01    PTT - ( 02 Jul 2019 08:18 )  PTT:99.4 sec

## 2019-07-03 NOTE — PROGRESS NOTE ADULT - SUBJECTIVE AND OBJECTIVE BOX
CARDIOLOGY FOLLOW UP - Dr. Lloyd    CC no cp or sob       PHYSICAL EXAM:  T(C): 36.8 (07-03-19 @ 10:53), Max: 37.3 (07-02-19 @ 22:08)  HR: 73 (07-03-19 @ 10:53) (73 - 76)  BP: 122/78 (07-03-19 @ 10:53) (102/68 - 122/78)  RR: 18 (07-03-19 @ 10:53) (18 - 18)  SpO2: 96% (07-03-19 @ 10:53) (93% - 96%)  Wt(kg): --  I&O's Summary      Appearance: Normal	  Cardiovascular: Normal S1 S2,RRR, No JVD, No murmurs  Respiratory: Lungs clear to auscultation	  Gastrointestinal:  Soft, Non-tender, + BS	  Extremities: Normal range of motion, No clubbing, cyanosis or edema        MEDICATIONS  (STANDING):  atorvastatin 10 milliGRAM(s) Oral at bedtime  ciprofloxacin   IVPB 400 milliGRAM(s) IV Intermittent every 12 hours  dextrose 5% + sodium chloride 0.9%. 1000 milliLiter(s) (100 mL/Hr) IV Continuous <Continuous>  dicyclomine 10 milliGRAM(s) Oral four times a day before meals  enoxaparin Injectable 80 milliGRAM(s) SubCutaneous every 12 hours  metroNIDAZOLE  IVPB      metroNIDAZOLE  IVPB 500 milliGRAM(s) IV Intermittent every 8 hours  ondansetron Injectable 4 milliGRAM(s) IV Push every 6 hours  pantoprazole  Injectable 40 milliGRAM(s) IV Push at bedtime      TELEMETRY: 	    ECG:  	  RADIOLOGY:   DIAGNOSTIC TESTING:  [ ] Echocardiogram:  [ ]  Catheterization:  [ ] Stress Test:    OTHER: 	  < from: Enteroscopy (07.03.19 @ 08:44) >  Findings:       significant edema, ? clot related vs other. awiat path                                                                                                        Impression:          - No specimens collected.  Recommendation:      - Await pathology results.                                                                          < end of copied text >    LABS:	 	                            12.2   10.3  )-----------( 337      ( 02 Jul 2019 08:17 )             38.7     07-02    139  |  100  |  8   ----------------------------<  140<H>  3.7   |  29  |  0.72    Ca    8.7      02 Jul 2019 08:17    TPro  5.8<L>  /  Alb  2.4<L>  /  TBili  0.3  /  DBili  x   /  AST  19  /  ALT  19  /  AlkPhos  47  07-01    PTT - ( 02 Jul 2019 08:18 )  PTT:99.4 sec

## 2019-07-03 NOTE — PROGRESS NOTE ADULT - ASSESSMENT
67 yo woman who was in her USOH developed severe RLQ and RUQ abd pain 5 days ago, associated w anorexia nausea, NO diarrhea, no vomiting, no chills, no change in BMs, saw her GI doc a day after and was prescribed CIPRO for an infection, but ptn w no diarrhea, n/v. 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 Marquez) Ptn states pain level is 10/10. CT A/P c/w severe intramural jejunal edema w mesenteric edema, suggestive of mesenteric ischemia, all mesenteric vessels are patent, seen by GI, Surgery, Heme, placed on Flagyl, IV Heparin (01 Jul 2019 12:07)    ER vss, afebrile.  WBC 10.7 --> 9.7.  UA (-) nit/LE.  CTap s/o mesenteric ischemia and possible venous clot, now on heparin gtt.  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  Pt states she has had giardia in the past, and her current symptoms are very similar to when she had giardia infection.  No change in dietary habits, no sick contacts.    Pt c/o feeling bloated, has not had a BM for 2-3 days.      ID consult called for further abx managment.      r/o infectious enteritits:    - Findings s/o mesenteric ischemia.  Thickening and edema seen in the jejunum and mesenteric edema noted on CT imaging. Pt started on heparin gtt for anticoagulation.    - r/o infectious etiologies.  Send stool cx, O&P, GI pcr, giardia Ag (thus far negative).  Will change to flagyl + cipro for broader coverage.  Awaiting final stool studies.   d/c abx if stools studies negative.     - s/p EGD, f/u path reports.  Found to have extensive esophageal edema. Pending MRV to evaluate for venous clot.      Will follow,    Paty Bennett  [phone] 69 yo woman who was in her USOH developed severe RLQ and RUQ abd pain 5 days ago, associated w anorexia nausea, NO diarrhea, no vomiting, no chills, no change in BMs, saw her GI doc a day after and was prescribed CIPRO for an infection, but ptn w no diarrhea, n/v. 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 Marquez) Ptn states pain level is 10/10. CT A/P c/w severe intramural jejunal edema w mesenteric edema, suggestive of mesenteric ischemia, all mesenteric vessels are patent, seen by GI, Surgery, Heme, placed on Flagyl, IV Heparin (01 Jul 2019 12:07)    ER vss, afebrile.  WBC 10.7 --> 9.7.  UA (-) nit/LE.  CTap s/o mesenteric ischemia and possible venous clot, now on heparin gtt.  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  Pt states she has had giardia in the past, and her current symptoms are very similar to when she had giardia infection.  No change in dietary habits, no sick contacts.    Pt c/o feeling bloated, has not had a BM for 2-3 days.      ID consult called for further abx managment.      r/o infectious enteritits:    - Findings s/o mesenteric ischemia.  Thickening and edema seen in the jejunum and mesenteric edema noted on CT imaging. Pt started on heparin gtt for anticoagulation.    - r/o infectious etiologies.  Send stool cx, O&P, GI pcr, giardia Ag (thus far negative).  Will change to flagyl + cipro for broader coverage.  Awaiting final stool studies.   d/c abx if stools studies negative.     - s/p EGD, f/u path reports.  Found to have extensive jejunal edema. Pending MRV to evaluate for venous clot.      Will follow,    Paty Bennett  [phone]

## 2019-07-03 NOTE — PROGRESS NOTE ADULT - SUBJECTIVE AND OBJECTIVE BOX
Pre-Endoscopy Evaluation      Referring Physician: dr. liset garrido                                    Procedure: enteroscopy    Indication for Procedure: r/o infectious etiology    Pertinent History: 68y female with  h/o Factor V Leiden, HLD, GERD presenting with abdominal pain   CT a/p revealed  jejunal thickening secondary to possible venous bowel ischemia.       Sedation by Anesthesia [x]    PAST MEDICAL & SURGICAL HISTORY:  Insomnia  Gastroesophageal reflux disease with esophagitis  History of osteoarthritis  Hypercholesteremia  Factor V Leiden  S/P LASIK surgery of both eyes  Intrauterine polyp  S/P D&C (status post dilation and curettage)  S/P hip replacement, right      PMH of Gastroparesis [ ]  Gastric Surgery [ ]  Gastric Outlet Obstruction [ ]    Allergies:    codeine (Hives; Anaphylaxis)  Dilaudid (Unknown)  fentanyl (Pruritus; Urticaria)  iodine (Anaphylaxis)    Intolerances    Latex allergy: [ ] yes [x] no    Medications:MEDICATIONS  (STANDING):  atorvastatin 10 milliGRAM(s) Oral at bedtime  ciprofloxacin   IVPB 400 milliGRAM(s) IV Intermittent every 12 hours  dextrose 5% + sodium chloride 0.9%. 1000 milliLiter(s) (100 mL/Hr) IV Continuous <Continuous>  dicyclomine 10 milliGRAM(s) Oral four times a day before meals  enoxaparin Injectable 80 milliGRAM(s) SubCutaneous every 12 hours  metroNIDAZOLE  IVPB      metroNIDAZOLE  IVPB 500 milliGRAM(s) IV Intermittent every 8 hours  ondansetron Injectable 4 milliGRAM(s) IV Push every 6 hours  pantoprazole  Injectable 40 milliGRAM(s) IV Push at bedtime    MEDICATIONS  (PRN):  ondansetron Injectable 4 milliGRAM(s) IV Push every 6 hours PRN Nausea and/or Vomiting      Smoking: [ ] yes  [x] no    AICD/PPM: [ ] yes   [x] no    Pertinent lab data:                        12.2   10.3  )-----------( 337      ( 02 Jul 2019 08:17 )             38.7     07-02    139  |  100  |  8   ----------------------------<  140<H>  3.7   |  29  |  0.72    Ca    8.7      02 Jul 2019 08:17    TPro  5.8<L>  /  Alb  2.4<L>  /  TBili  0.3  /  DBili  x   /  AST  19  /  ALT  19  /  AlkPhos  47  07-01    PTT - ( 02 Jul 2019 08:18 )  PTT:99.4 sec          Physical Examination:     Daily   Vital Signs Last 24 Hrs  T(C): 36.9 (03 Jul 2019 04:30), Max: 37.3 (02 Jul 2019 22:08)  T(F): 98.5 (03 Jul 2019 04:30), Max: 99.1 (02 Jul 2019 22:08)  HR: 76 (03 Jul 2019 04:30) (76 - 76)  BP: 115/74 (03 Jul 2019 04:30) (102/68 - 115/74)  BP(mean): --  RR: 18 (03 Jul 2019 04:30) (18 - 18)  SpO2: 93% (03 Jul 2019 04:30) (93% - 95%)    Drug Dosing Weight  Height (cm): 172.72 (01 Jul 2019 23:38)  Weight (kg): 74.8 (01 Jul 2019 23:38)  BMI (kg/m2): 25.1 (01 Jul 2019 23:38)  BSA (m2): 1.88 (01 Jul 2019 23:38)    Constitutional: NAD     Neck:  No JVD    Respiratory: CTAB/L    Cardiovascular: S1 and S2    Gastrointestinal: BS+, soft, NT/ND    Extremities: No peripheral edema    Neurological: A/O x 3    : No Redd    Skin: No rashes    Comments: AM lovenox dose held    ASA Class: I [ ]  II [ ]  III [X]  IV [ ]    The patient is a suitable candidate for the planned procedure unless box checked [ ]  No, explain:

## 2019-07-04 LAB
CRP SERPL-MCNC: 10.78 MG/DL — HIGH (ref 0–0.4)
CULTURE RESULTS: SIGNIFICANT CHANGE UP
HCT VFR BLD CALC: 34.3 % — LOW (ref 34.5–45)
HGB BLD-MCNC: 11.1 G/DL — LOW (ref 11.5–15.5)
MCHC RBC-ENTMCNC: 31.6 PG — SIGNIFICANT CHANGE UP (ref 27–34)
MCHC RBC-ENTMCNC: 32.4 GM/DL — SIGNIFICANT CHANGE UP (ref 32–36)
MCV RBC AUTO: 97.7 FL — SIGNIFICANT CHANGE UP (ref 80–100)
PLATELET # BLD AUTO: 380 K/UL — SIGNIFICANT CHANGE UP (ref 150–400)
RBC # BLD: 3.51 M/UL — LOW (ref 3.8–5.2)
RBC # FLD: 12.8 % — SIGNIFICANT CHANGE UP (ref 10.3–14.5)
SPECIMEN SOURCE: SIGNIFICANT CHANGE UP
WBC # BLD: 6.8 K/UL — SIGNIFICANT CHANGE UP (ref 3.8–10.5)
WBC # FLD AUTO: 6.8 K/UL — SIGNIFICANT CHANGE UP (ref 3.8–10.5)

## 2019-07-04 PROCEDURE — 71250 CT THORAX DX C-: CPT | Mod: 26

## 2019-07-04 RX ORDER — BUDESONIDE AND FORMOTEROL FUMARATE DIHYDRATE 160; 4.5 UG/1; UG/1
2 AEROSOL RESPIRATORY (INHALATION)
Refills: 0 | Status: DISCONTINUED | OUTPATIENT
Start: 2019-07-04 | End: 2019-07-05

## 2019-07-04 RX ADMIN — Medication 10 MILLIGRAM(S): at 12:09

## 2019-07-04 RX ADMIN — Medication 100 MILLIGRAM(S): at 05:14

## 2019-07-04 RX ADMIN — ENOXAPARIN SODIUM 80 MILLIGRAM(S): 100 INJECTION SUBCUTANEOUS at 05:14

## 2019-07-04 RX ADMIN — ONDANSETRON 4 MILLIGRAM(S): 8 TABLET, FILM COATED ORAL at 12:09

## 2019-07-04 RX ADMIN — Medication 100 MILLIGRAM(S): at 21:01

## 2019-07-04 RX ADMIN — Medication 10 MILLIGRAM(S): at 17:10

## 2019-07-04 RX ADMIN — Medication 10 MILLIGRAM(S): at 21:01

## 2019-07-04 RX ADMIN — Medication 200 MILLIGRAM(S): at 05:13

## 2019-07-04 RX ADMIN — ONDANSETRON 4 MILLIGRAM(S): 8 TABLET, FILM COATED ORAL at 17:10

## 2019-07-04 RX ADMIN — Medication 200 MILLIGRAM(S): at 17:09

## 2019-07-04 RX ADMIN — ATORVASTATIN CALCIUM 10 MILLIGRAM(S): 80 TABLET, FILM COATED ORAL at 21:01

## 2019-07-04 RX ADMIN — ONDANSETRON 4 MILLIGRAM(S): 8 TABLET, FILM COATED ORAL at 05:14

## 2019-07-04 RX ADMIN — BUDESONIDE AND FORMOTEROL FUMARATE DIHYDRATE 2 PUFF(S): 160; 4.5 AEROSOL RESPIRATORY (INHALATION) at 21:01

## 2019-07-04 RX ADMIN — Medication 100 MILLIGRAM(S): at 12:10

## 2019-07-04 RX ADMIN — ENOXAPARIN SODIUM 80 MILLIGRAM(S): 100 INJECTION SUBCUTANEOUS at 17:09

## 2019-07-04 RX ADMIN — SODIUM CHLORIDE 100 MILLILITER(S): 9 INJECTION, SOLUTION INTRAVENOUS at 20:30

## 2019-07-04 RX ADMIN — PANTOPRAZOLE SODIUM 40 MILLIGRAM(S): 20 TABLET, DELAYED RELEASE ORAL at 21:00

## 2019-07-04 NOTE — PROGRESS NOTE ADULT - SUBJECTIVE AND OBJECTIVE BOX
OLEG LOE  MRN-[id number]    Patient is a 68y old  Female who presents with a chief complaint of abdominal pain (04 Jul 2019 13:18)      Review of System    still complaining of distention, discomfort    Current Meds  MEDICATIONS  (STANDING):  atorvastatin 10 milliGRAM(s) Oral at bedtime  buDESOnide  80 MICROgram(s)/formoterol 4.5 MICROgram(s) Inhaler 2 Puff(s) Inhalation two times a day  ciprofloxacin   IVPB 400 milliGRAM(s) IV Intermittent every 12 hours  dextrose 5% + sodium chloride 0.9%. 1000 milliLiter(s) (100 mL/Hr) IV Continuous <Continuous>  dicyclomine 10 milliGRAM(s) Oral four times a day before meals  enoxaparin Injectable 80 milliGRAM(s) SubCutaneous every 12 hours  metroNIDAZOLE  IVPB      metroNIDAZOLE  IVPB 500 milliGRAM(s) IV Intermittent every 8 hours  ondansetron Injectable 4 milliGRAM(s) IV Push every 6 hours  pantoprazole  Injectable 40 milliGRAM(s) IV Push at bedtime    MEDICATIONS  (PRN):  ondansetron Injectable 4 milliGRAM(s) IV Push every 6 hours PRN Nausea and/or Vomiting      Vitals  Vital Signs Last 24 Hrs  T(C): 36.8 (04 Jul 2019 12:08), Max: 37.2 (03 Jul 2019 20:41)  T(F): 98.3 (04 Jul 2019 12:08), Max: 98.9 (03 Jul 2019 20:41)  HR: 72 (04 Jul 2019 12:08) (72 - 78)  BP: 112/73 (04 Jul 2019 12:08) (108/69 - 114/72)  BP(mean): --  RR: 18 (04 Jul 2019 12:08) (17 - 18)  SpO2: 97% (04 Jul 2019 12:08) (92% - 97%)    Physical Exam      Constitutional: NAD        Lab  CBC Full  -  ( 04 Jul 2019 09:33 )  WBC Count : 6.80 K/uL  RBC Count : 3.51 M/uL  Hemoglobin : 11.1 g/dL  Hematocrit : 34.3 %  Platelet Count - Automated : 380 K/uL  Mean Cell Volume : 97.7 fl  Mean Cell Hemoglobin : 31.6 pg  Mean Cell Hemoglobin Concentration : 32.4 gm/dL  Auto Neutrophil # : x  Auto Lymphocyte # : x  Auto Monocyte # : x  Auto Eosinophil # : x  Auto Basophil # : x  Auto Neutrophil % : x  Auto Lymphocyte % : x  Auto Monocyte % : x  Auto Eosinophil % : x  Auto Basophil % : x              Rad:    Assessment/Plan

## 2019-07-04 NOTE — PROGRESS NOTE ADULT - ASSESSMENT
67 yo woman w acute abd pain started on 6/26, no diarrhea, CT findings c/w jejunal inflammation, not clear why, no sign of vascular clot, possible mesenteric ischemia, seen by heme and GI and due to h/o being possibly hypercoagulable ( heterozygous factor V deficiency ptn was on HEPARIN drip, now on sc Lovenox since 7/2,  24 hrs into the AC treatment, pain gone, nausea gone,    EGD on 7/3 shows severe jejunal edema, venous clot a concern, MRV done, results pending, i think she needs a hypercoagulable work up besides being Factor V heterozygous def. this was discussed w heme  still on empiric ABx. all PCRs are neg, change clears to full liquid diet, cont IV protonix, 69 yo woman w acute abd pain started on 6/26, no diarrhea, CT findings c/w jejunal inflammation, not clear why, no sign of vascular clot, possible mesenteric ischemia, seen by heme and GI and due to h/o being possibly hypercoagulable ( heterozygous factor V deficiency ptn was on HEPARIN drip, now on sc Lovenox since 7/2,  24 hrs into the AC treatment, pain gone, nausea gone,    EGD on 7/3 shows severe jejunal edema, venous clot a concern, MRV done, results pending, i think she needs a hypercoagulable work up besides being Factor V heterozygous def. this was discussed w heme  ptn states she has had a cough for the past 3 weeks and now at times has left pleuritic chest pain when coughing.   she refused CTA chest to R/O PE, doesn't want any more contrast. agrees to NON-contrast CT Chest, will call dr. bourgeois's group who ptn sees for her KYLE, but noncompliant w CPAP  still on empiric ABx. all PCRs are neg, change clears to full liquid diet, cont IV protonix,

## 2019-07-04 NOTE — CONSULT NOTE ADULT - ASSESSMENT
68 r old woman, history as noted, including heterozygote Factor V deficiency,  KYLE- Dr Rodriguez- ( non compliant with CPAP)- who presented to the hospital with progressive right sided abdominal pain.  work up and CT findings with jejunal edema, inflammation. work up in progress for thrombotic disease.  The patient also notes a recent URI with residual cough, intermittent wheeze and left sided back pain, mainly pleuritic. She states symptoms are improving.  She denies increasing sob, hemoptyis, fever or other respiratory complaints.  She has refused a CTA    fortunately, pulmonary status is stable.  symptoms most consistent with post infectious bronchospasm, muscle strain from coughing and pleurisy.  will follow up on CT non-contrast for evidence of infection.  will start symbicort 80 bid for bronchospasm with clinical monitoring of chest pain and respiratory symptoms.  There may also be a component of referred abdominal pain    plans, work up as per GI, heme,medicine  f/u CT chest  dvt prophylaxis  routine gi prophylaxis as necessary  Oxygen saturation greater than 92%  incentive spirometry  discussed at length with pt.  if chest discomfort progresses, may need to pursue CTA and or leg dopplers.

## 2019-07-04 NOTE — PROGRESS NOTE ADULT - ASSESSMENT
69 y/o woman with h/o Factor V leiden. No h/o thrombosis.  Patient on chronic advil.  Now with possible mesenteric ischemia, possible infectious colitis. Heparin and abx started.   Gi and surgery seeing patient. ID also following  No hematologic objection to use of AC     Hypercoag work up ordered. except AT III, as patient on lovenox

## 2019-07-04 NOTE — PROGRESS NOTE ADULT - SUBJECTIVE AND OBJECTIVE BOX
Patient is a 68y old  Female who presents with a chief complaint of abdominal pain (03 Jul 2019 18:11)      SUBJECTIVE / OVERNIGHT EVENTS: ptn is pain free, still w distended abd, states she wants a solid diet, will advance to full liquid but any further advancement needs GI clearance.    MEDICATIONS  (STANDING):  atorvastatin 10 milliGRAM(s) Oral at bedtime  ciprofloxacin   IVPB 400 milliGRAM(s) IV Intermittent every 12 hours  dextrose 5% + sodium chloride 0.9%. 1000 milliLiter(s) (100 mL/Hr) IV Continuous <Continuous>  dicyclomine 10 milliGRAM(s) Oral four times a day before meals  enoxaparin Injectable 80 milliGRAM(s) SubCutaneous every 12 hours  metroNIDAZOLE  IVPB      metroNIDAZOLE  IVPB 500 milliGRAM(s) IV Intermittent every 8 hours  ondansetron Injectable 4 milliGRAM(s) IV Push every 6 hours  pantoprazole  Injectable 40 milliGRAM(s) IV Push at bedtime    MEDICATIONS  (PRN):  ondansetron Injectable 4 milliGRAM(s) IV Push every 6 hours PRN Nausea and/or Vomiting      Vital Signs Last 24 Hrs  T(F): 98.6 (07-04-19 @ 04:36), Max: 98.9 (07-03-19 @ 20:41)  HR: 72 (07-04-19 @ 04:36) (72 - 78)  BP: 114/72 (07-04-19 @ 04:36) (108/69 - 122/78)  RR: 17 (07-04-19 @ 04:36) (17 - 18)  SpO2: 92% (07-04-19 @ 04:36) (92% - 96%)  Telemetry:   CAPILLARY BLOOD GLUCOSE        I&O's Summary      PHYSICAL EXAM:  GENERAL: NAD, well-developed  HEAD:  Atraumatic, Normocephalic  EYES: EOMI, PERRLA, conjunctiva and sclera clear  NECK: Supple, No JVD  CHEST/LUNG: Clear to auscultation bilaterally; No wheeze  HEART: Regular rate and rhythm; No murmurs, rubs, or gallops  ABDOMEN: Soft, Nontender, Nondistended; Bowel sounds present  EXTREMITIES:  2+ Peripheral Pulses, No clubbing, cyanosis, or edema  PSYCH: AAOx3  NEUROLOGY: non-focal  SKIN: No rashes or lesions    LABS:                        11.7   7.34  )-----------( 394      ( 03 Jul 2019 12:07 )             36.9     07-02    139  |  100  |  8   ----------------------------<  140<H>  3.7   |  29  |  0.72    Ca    8.7      02 Jul 2019 08:17      PTT - ( 02 Jul 2019 08:18 )  PTT:99.4 sec          RADIOLOGY & ADDITIONAL TESTS:    Imaging Personally Reviewed:    Consultant(s) Notes Reviewed:      Care Discussed with Consultants/Other Providers:

## 2019-07-04 NOTE — CONSULT NOTE ADULT - SUBJECTIVE AND OBJECTIVE BOX
[facility] - [facility]  CONSULT NOTE    CHIEF COMPLAINT: right sided abdominal pain    HPI: 68 r old woman, history as noted, including heterozygote Factor V deficiency,  KYLE- Dr Rodriguez- ( non compliant with CPAP)- who presented to the hospital with progressive right sided abdominal pain.  work up and CT findings with jejunal edema, inflammation. work up in progress for thrombotic disease.  The patient also notes a recent URI with residual cough, intermittent wheeze and left sided back pain, mainly pleuritic. She states symptoms are improving.  She denies increasing sob, hemoptyis, fever or other respiratory complaints.  She has refused a CTA    PMHX:  Insomnia  Gastroesophageal reflux disease with esophagitis  History of osteoarthritis  Hypercholesteremia  Factor V Leiden  KYLE      PSHX:  S/P LASIK surgery of both eyes  Intrauterine polyp  S/P D&C (status post dilation and curettage)  S/P hip replacement, right      FAMILY HISTORY:  Family history of MI (myocardial infarction)  Family history of factor V Leiden mutation: osteoarthritis      SOCIAL HISTORY:    Pulmonary Medications:       Antimicrobials:  ciprofloxacin   IVPB 400 milliGRAM(s) IV Intermittent every 12 hours  metroNIDAZOLE  IVPB      metroNIDAZOLE  IVPB 500 milliGRAM(s) IV Intermittent every 8 hours      Cardiology:      Other:  atorvastatin 10 milliGRAM(s) Oral at bedtime  dextrose 5% + sodium chloride 0.9%. 1000 milliLiter(s) IV Continuous <Continuous>  dicyclomine 10 milliGRAM(s) Oral four times a day before meals  enoxaparin Injectable 80 milliGRAM(s) SubCutaneous every 12 hours  ondansetron Injectable 4 milliGRAM(s) IV Push every 6 hours PRN  ondansetron Injectable 4 milliGRAM(s) IV Push every 6 hours  pantoprazole  Injectable 40 milliGRAM(s) IV Push at bedtime      Allergies    codeine (Hives; Anaphylaxis)  Dilaudid (Unknown)  fentanyl (Pruritus; Urticaria)  iodine (Anaphylaxis)    Intolerances        HOME MEDICATIONS:    REVIEW OF SYSTEMS: (Negative unless otherwise delineated)     Constitutional: No fevers, chills, sweats. weight loss, fatigue, weakness, malaise, lethargy  Eyes: No itching or discharge from the eyes, visual change, blurred vision, double vision, yellow sclerae, eye pain.  ENT: No ear pain, ear discharge, tinnitus, change in hearing, nasal congestion, runny nose, post nasal drip, epistaxis, sinus pain, sore throat, globus sensation, dental problems, oral ulcers/lesions  CV: No chest pain, chest pressure, chest discomfort, palpitations, lightheadedness/dizziness, syncope, lower extremity edema, inability to lay flat to sleep, awakening from sleep unable to sleep, claudication.  Resp: No dyspnea at rest, dyspnea on exertion, chest congestion/wheeze, cough. stridor, sputum production, +chest pain with respiration, -hemoptysis  GI: No anorexia, nausea, vomiting, constipation, + abdominal pain.- blood in the stool, diarrhea, melena, change in bowel habits or stools, dysphagia, odynophagia, heartburn  : No burning on urination, urinary urgency, urinary frequency, urinary hesitancy, incontinence, blood in the urine, waking up at night to urinate, change in urine color, urinary retention.   Neurological: No headache, dizziness, syncope, paralysis, unsteady gait, muscle weakness, change in bowel or bladder control, numbness or tingling in the extremities, change in smell or taste, change in speech, tremor, memory change/loss, vertigo, frequent falls, confusion  MSK: No muscle pain, back pain, joint pain, joint stiffness, joint swelling   Hematologic: No anemia, bleeding, bruising, ecchymoses.   Lymphatics: No enlarged nodes, history of splenectomy  Psychiatric: No depression, anxiety, bipolar disorde, schizophrenia, hallucinations, suicidal/homicidal thoughts  Endocrinologic: No weight change, sweating, cold or heat intolerance, polyuria, polydipsia, polyphagia, abnormal hair growth, change in nails, tremor, neck pain or swelling.   Allergies: No hives, eczema, allergic rhinitis  Integumentary: No rash, new/growing/changing skin lesions, bruising, pruritis, decubiti                                                                                                                                                                                [ ]Unable to obtain    OBJECTIVE:      ICU Vital Signs Last 24 Hrs  T(C): 36.8 (04 Jul 2019 12:08), Max: 37.2 (03 Jul 2019 20:41)  T(F): 98.3 (04 Jul 2019 12:08), Max: 98.9 (03 Jul 2019 20:41)  HR: 72 (04 Jul 2019 12:08) (72 - 78)  BP: 112/73 (04 Jul 2019 12:08) (108/69 - 114/72)  BP(mean): --  ABP: --  ABP(mean): --  RR: 18 (04 Jul 2019 12:08) (17 - 18)  SpO2: 97% (04 Jul 2019 12:08) (92% - 97%)      I&O's Detail    Daily     Daily   CAPILLARY BLOOD GLUCOSE          PHYSICAL EXAM:  General: Awake, alert, cooperative, no distress, appears stated age   Head: Atraumatic, normocephalic  Eyes: Anicteric, conjunctiva/corneas clear, EOM's intact, PERRL, both eyes               Ears: External examination WNL, both ears                Nose: Nares normal, septum midline, mucosa normal, no drainage or sinus tenderness  Throat: Mallampatti Grade     No tonsillar or pharyngeal exudates. Lips, mucosa and tongue WNL; teeth and gums WNL  Neck: Supple, symmetrica, trachea midline; thyroid without enlargement/tenderness/nodules; no carotid bruit; no JVD  Back: Symmetric, no curvature, range of motion normal, no CVA tenderness  Chest wall: No tenderness or deformity  Respiratory: Respirations unlabored; Clear to auscultation and percussion bilaterally  Cardiovascular: Regular rate and rhythm, S1 S2 normal. No murmurs, rubs or gallops.   Abdomen: Soft, non-tender, non-distended. No organomegaly. No masses. Normal bowel sounds  Extremities: Warm to touch. No clubbing or cyanosis. No pedal edema.  Pulses: 2+ peripheral pulses all extremities  Skin: Normal skin color, texture and turgor. No rashes or lesions  Lymph Nodes: Cervical, supraclavicular and axillary nodes normal  Neurological: Motor and sensory examination equal and normal. A and O x 3  Psychiatry: Appropriate mood and affect.    LABS:                        11.1   6.80  )-----------( 380      ( 04 Jul 2019 09:33 )             34.3                     MICROBIOLOGY:     RADIOLOGY:  [ ] Reviewed and interpreted by me    < from: MR Abdomen w/ IV Cont (07.03.19 @ 17:36) >  XAM:  MR ABDOMEN IC                            PROCEDURE DATE:  07/03/2019            INTERPRETATION:  CLINICAL INFORMATION: Small bowel enteritis. Evaluate   for mesenteric venous thrombus.    COMPARISON: CT 6/30/2019    PROCEDURE:   MRI of the abdomen was performed with and without intravenous contrast.  IV Contrast: Gadavist. 10 cc administered, 0 cc discarded.      FINDINGS:    LOWER CHEST: Within normal limits.    LIVER: Within normal limits.   BILE DUCTS: Normal caliber.  GALLBLADDER: Within normal limits.  SPLEEN: Within normal limits.  PANCREAS: Within normal limits.  ADRENALS: Within normal limits.  KIDNEYS/URETERS: Within normal limits.    VISUALIZED PORTIONS:    BOWEL: Long segment small bowel enteritis as evidenced by marked   thickening and edema as well as a colitis involving the transverse and   left colon without significant change from prior study 6/30/2019.   etiology is unclear.   PERITONEUM: Small ascites without change.  VESSELS: Celiac axis, SMA and JE are widelypatent as are the SMV and   portal veins.  RETROPERITONEUM: No lymphadenopathy.    ABDOMINAL WALL: Within normal limits.  BONES: Within normal limits.    IMPRESSION:     Enterocolitis as above with marked small bowel wall thickening and edema,   etiology unclear.    Small volume ascites.    Mesenteric vasculature is patent.    Findings are not significantly changed from prior study 6/30/2019.    < end of copied text >  < from: CT Abdomen and Pelvis w/ IV Cont (06.30.19 @ 23:43) >    EXAM:  CT ABDOMEN AND PELVIS IC                            PROCEDURE DATE:  06/30/2019            INTERPRETATION:  Abdominal/Pelvic CT    7/1/2019 12:32 AM    Indication: Abdominal distention and diffuse abdominal pain    Technique: Axial images were obtained following IV contrast from the lung   bases through pubic symphysis.  90 cc of Omnipaque 350 was administered   intravenously without complication and 10 cc was discarded.  Reformatted   coronal and sagittal images are submitted.    Comparison: None    FINDINGS:    LUNG BASES:  There are no pleural effusions.  PERITONEUM:  There is no free air or focal collection.  Mild volume of   abdominopelvic ascites  LIVER: Normal.  SPLEEN: Normal.  GALLBLADDER: Contracted.  BILIARY TREE: Unremarkable.  PANCREAS: Normal.  ADRENAL GLANDS: Normal.  KIDNEYS: Normal.  BOWEL: Small hiatal hernia. The stomach is under distended. Marked wall   thickening, intramural edema and inflammatory change involving the   jejunum with transition to normal bowelin the left hemiabdomen. There is   severe mesenteric edema and interloop fluid. Distal bowel loops are   collapsed. The appendix is unremarkable. There is scattered colonic   diverticulosis. The colon is collapsed.    URINARY BLADDER: Incompletely distended.  PELVIC ORGANS: No pelvic masses.    There is no significant adenopathy.  VASCULATURE: The superior mesenteric vein, splenic vein, and main portal   vein are patent. There are no obvious filling defects visualized. The   superior mesenteric artery is patent proximally.  RETROPERITONEUM:  There is no mass.  BONES: The patient is status post right total hip arthroplasty.  ABDOMINAL WALL: Unremarkable.    IMPRESSION:    Marked thickening and intramural edema involving the jejunum.    Marked  mesenteric edema and interloop fluid. Appearance suggests venous bowel   ischemia.  No pneumatosis or portal venous gas. The superior mesenteric   vein and superior mesenteric artery appear patent proximally.  Findings   discussed with Dr. Carlson at1:10 am on 7/1/19 with [facility].        < end of copied text >

## 2019-07-04 NOTE — PROGRESS NOTE ADULT - SUBJECTIVE AND OBJECTIVE BOX
OLEG LOE:[id number],   68yFemale followed for:  codeine (Hives; Anaphylaxis)  Dilaudid (Unknown)  fentanyl (Pruritus; Urticaria)  iodine (Anaphylaxis)    PAST MEDICAL & SURGICAL HISTORY:  Insomnia  Gastroesophageal reflux disease with esophagitis  History of osteoarthritis  Hypercholesteremia  Factor V Leiden  S/P LASIK surgery of both eyes  Intrauterine polyp  S/P D&C (status post dilation and curettage)  S/P hip replacement, right    FAMILY HISTORY:  Family history of MI (myocardial infarction)  Family history of factor V Leiden mutation: osteoarthritis    MEDICATIONS  (STANDING):  atorvastatin 10 milliGRAM(s) Oral at bedtime  ciprofloxacin   IVPB 400 milliGRAM(s) IV Intermittent every 12 hours  dextrose 5% + sodium chloride 0.9%. 1000 milliLiter(s) (100 mL/Hr) IV Continuous <Continuous>  dicyclomine 10 milliGRAM(s) Oral four times a day before meals  enoxaparin Injectable 80 milliGRAM(s) SubCutaneous every 12 hours  metroNIDAZOLE  IVPB      metroNIDAZOLE  IVPB 500 milliGRAM(s) IV Intermittent every 8 hours  ondansetron Injectable 4 milliGRAM(s) IV Push every 6 hours  pantoprazole  Injectable 40 milliGRAM(s) IV Push at bedtime    MEDICATIONS  (PRN):  ondansetron Injectable 4 milliGRAM(s) IV Push every 6 hours PRN Nausea and/or Vomiting      Vital Signs Last 24 Hrs  T(C): 37 (04 Jul 2019 04:36), Max: 37.2 (03 Jul 2019 20:41)  T(F): 98.6 (04 Jul 2019 04:36), Max: 98.9 (03 Jul 2019 20:41)  HR: 72 (04 Jul 2019 04:36) (72 - 78)  BP: 114/72 (04 Jul 2019 04:36) (108/69 - 122/78)  BP(mean): --  RR: 17 (04 Jul 2019 04:36) (17 - 18)  SpO2: 92% (04 Jul 2019 04:36) (92% - 96%)  nc/at  s1s2  cta  soft, nt, nd no guarding or rebound  no c/c/e    CBC Full  -  ( 04 Jul 2019 09:33 )  WBC Count : 6.80 K/uL  RBC Count : 3.51 M/uL  Hemoglobin : 11.1 g/dL  Hematocrit : 34.3 %  Platelet Count - Automated : 380 K/uL  Mean Cell Volume : 97.7 fl  Mean Cell Hemoglobin : 31.6 pg  Mean Cell Hemoglobin Concentration : 32.4 gm/dL  Auto Neutrophil # : x  Auto Lymphocyte # : x  Auto Monocyte # : x  Auto Eosinophil # : x  Auto Basophil # : x  Auto Neutrophil % : x  Auto Lymphocyte % : x  Auto Monocyte % : x  Auto Eosinophil % : x  Auto Basophil % : x

## 2019-07-04 NOTE — PROGRESS NOTE ADULT - ASSESSMENT
ct reviewed.  pt with severe edema jejunum. enetero with smae finding, elevated юлия in past, check esr and crp. on abx and a/c.  need heme, ? rheum.  await path.  will d/w dr. garrido await mrv

## 2019-07-05 ENCOUNTER — TRANSCRIPTION ENCOUNTER (OUTPATIENT)
Age: 69
End: 2019-07-05

## 2019-07-05 VITALS
SYSTOLIC BLOOD PRESSURE: 129 MMHG | DIASTOLIC BLOOD PRESSURE: 76 MMHG | HEART RATE: 82 BPM | TEMPERATURE: 98 F | RESPIRATION RATE: 18 BRPM | OXYGEN SATURATION: 95 %

## 2019-07-05 LAB
ANA PAT FLD IF-IMP: ABNORMAL
ANA TITR SER: SIGNIFICANT CHANGE UP
CULTURE RESULTS: SIGNIFICANT CHANGE UP
HCT VFR BLD CALC: 35.9 % — SIGNIFICANT CHANGE UP (ref 34.5–45)
HCYS SERPL-MCNC: 6.6 UMOL/L — SIGNIFICANT CHANGE UP
HGB BLD-MCNC: 11.3 G/DL — LOW (ref 11.5–15.5)
MCHC RBC-ENTMCNC: 30.9 PG — SIGNIFICANT CHANGE UP (ref 27–34)
MCHC RBC-ENTMCNC: 31.5 GM/DL — LOW (ref 32–36)
MCV RBC AUTO: 98.1 FL — SIGNIFICANT CHANGE UP (ref 80–100)
PLATELET # BLD AUTO: 346 K/UL — SIGNIFICANT CHANGE UP (ref 150–400)
RBC # BLD: 3.66 M/UL — LOW (ref 3.8–5.2)
RBC # FLD: 12.6 % — SIGNIFICANT CHANGE UP (ref 10.3–14.5)
SPECIMEN SOURCE: SIGNIFICANT CHANGE UP
SURGICAL PATHOLOGY STUDY: SIGNIFICANT CHANGE UP
WBC # BLD: 7.57 K/UL — SIGNIFICANT CHANGE UP (ref 3.8–10.5)
WBC # FLD AUTO: 7.57 K/UL — SIGNIFICANT CHANGE UP (ref 3.8–10.5)

## 2019-07-05 PROCEDURE — 81003 URINALYSIS AUTO W/O SCOPE: CPT

## 2019-07-05 PROCEDURE — 86140 C-REACTIVE PROTEIN: CPT

## 2019-07-05 PROCEDURE — 80048 BASIC METABOLIC PNL TOTAL CA: CPT

## 2019-07-05 PROCEDURE — 82330 ASSAY OF CALCIUM: CPT

## 2019-07-05 PROCEDURE — 87045 FECES CULTURE AEROBIC BACT: CPT

## 2019-07-05 PROCEDURE — 85652 RBC SED RATE AUTOMATED: CPT

## 2019-07-05 PROCEDURE — 81001 URINALYSIS AUTO W/SCOPE: CPT

## 2019-07-05 PROCEDURE — 99285 EMERGENCY DEPT VISIT HI MDM: CPT | Mod: 25

## 2019-07-05 PROCEDURE — 94640 AIRWAY INHALATION TREATMENT: CPT

## 2019-07-05 PROCEDURE — 82803 BLOOD GASES ANY COMBINATION: CPT

## 2019-07-05 PROCEDURE — 86900 BLOOD TYPING SEROLOGIC ABO: CPT

## 2019-07-05 PROCEDURE — 85306 CLOT INHIBIT PROT S FREE: CPT

## 2019-07-05 PROCEDURE — 87177 OVA AND PARASITES SMEARS: CPT

## 2019-07-05 PROCEDURE — 85730 THROMBOPLASTIN TIME PARTIAL: CPT

## 2019-07-05 PROCEDURE — 71250 CT THORAX DX C-: CPT

## 2019-07-05 PROCEDURE — 83090 ASSAY OF HOMOCYSTEINE: CPT

## 2019-07-05 PROCEDURE — 85027 COMPLETE CBC AUTOMATED: CPT

## 2019-07-05 PROCEDURE — 74182 MRI ABDOMEN W/CONTRAST: CPT

## 2019-07-05 PROCEDURE — 96361 HYDRATE IV INFUSION ADD-ON: CPT

## 2019-07-05 PROCEDURE — 76705 ECHO EXAM OF ABDOMEN: CPT

## 2019-07-05 PROCEDURE — 86901 BLOOD TYPING SEROLOGIC RH(D): CPT

## 2019-07-05 PROCEDURE — 87507 IADNA-DNA/RNA PROBE TQ 12-25: CPT

## 2019-07-05 PROCEDURE — 87329 GIARDIA AG IA: CPT

## 2019-07-05 PROCEDURE — 85610 PROTHROMBIN TIME: CPT

## 2019-07-05 PROCEDURE — 86038 ANTINUCLEAR ANTIBODIES: CPT

## 2019-07-05 PROCEDURE — 96375 TX/PRO/DX INJ NEW DRUG ADDON: CPT

## 2019-07-05 PROCEDURE — 86803 HEPATITIS C AB TEST: CPT

## 2019-07-05 PROCEDURE — 81240 F2 GENE: CPT

## 2019-07-05 PROCEDURE — 80053 COMPREHEN METABOLIC PANEL: CPT

## 2019-07-05 PROCEDURE — 87046 STOOL CULTR AEROBIC BACT EA: CPT

## 2019-07-05 PROCEDURE — 83605 ASSAY OF LACTIC ACID: CPT

## 2019-07-05 PROCEDURE — A9585: CPT

## 2019-07-05 PROCEDURE — 83690 ASSAY OF LIPASE: CPT

## 2019-07-05 PROCEDURE — G0452: CPT | Mod: 26

## 2019-07-05 PROCEDURE — 82435 ASSAY OF BLOOD CHLORIDE: CPT

## 2019-07-05 PROCEDURE — 86850 RBC ANTIBODY SCREEN: CPT

## 2019-07-05 PROCEDURE — 85014 HEMATOCRIT: CPT

## 2019-07-05 PROCEDURE — 85303 CLOT INHIBIT PROT C ACTIVITY: CPT

## 2019-07-05 PROCEDURE — 87086 URINE CULTURE/COLONY COUNT: CPT

## 2019-07-05 PROCEDURE — 99223 1ST HOSP IP/OBS HIGH 75: CPT

## 2019-07-05 PROCEDURE — 83735 ASSAY OF MAGNESIUM: CPT

## 2019-07-05 PROCEDURE — 96366 THER/PROPH/DIAG IV INF ADDON: CPT

## 2019-07-05 PROCEDURE — 84132 ASSAY OF SERUM POTASSIUM: CPT

## 2019-07-05 PROCEDURE — 82947 ASSAY GLUCOSE BLOOD QUANT: CPT

## 2019-07-05 PROCEDURE — 74019 RADEX ABDOMEN 2 VIEWS: CPT

## 2019-07-05 PROCEDURE — 88305 TISSUE EXAM BY PATHOLOGIST: CPT

## 2019-07-05 PROCEDURE — 74177 CT ABD & PELVIS W/CONTRAST: CPT

## 2019-07-05 PROCEDURE — 84295 ASSAY OF SERUM SODIUM: CPT

## 2019-07-05 PROCEDURE — 96365 THER/PROPH/DIAG IV INF INIT: CPT | Mod: XU

## 2019-07-05 RX ORDER — CIPROFLOXACIN LACTATE 400MG/40ML
1 VIAL (ML) INTRAVENOUS
Qty: 6 | Refills: 0
Start: 2019-07-05 | End: 2019-07-07

## 2019-07-05 RX ORDER — PANTOPRAZOLE SODIUM 20 MG/1
40 TABLET, DELAYED RELEASE ORAL
Refills: 0 | Status: DISCONTINUED | OUTPATIENT
Start: 2019-07-05 | End: 2019-07-05

## 2019-07-05 RX ORDER — LACTOBACILLUS ACIDOPHILUS 100MM CELL
1 CAPSULE ORAL
Qty: 14 | Refills: 0
Start: 2019-07-05 | End: 2019-07-11

## 2019-07-05 RX ORDER — ACETAMINOPHEN 500 MG
650 TABLET ORAL ONCE
Refills: 0 | Status: COMPLETED | OUTPATIENT
Start: 2019-07-05 | End: 2019-07-05

## 2019-07-05 RX ORDER — GUAIFENESIN/DEXTROMETHORPHAN 600MG-30MG
10 TABLET, EXTENDED RELEASE 12 HR ORAL
Qty: 0 | Refills: 0 | DISCHARGE
Start: 2019-07-05

## 2019-07-05 RX ORDER — BUDESONIDE AND FORMOTEROL FUMARATE DIHYDRATE 160; 4.5 UG/1; UG/1
2 AEROSOL RESPIRATORY (INHALATION)
Qty: 0 | Refills: 0 | DISCHARGE
Start: 2019-07-05

## 2019-07-05 RX ORDER — LACTOBACILLUS ACIDOPHILUS 100MM CELL
1 CAPSULE ORAL
Refills: 0 | Status: DISCONTINUED | OUTPATIENT
Start: 2019-07-05 | End: 2019-07-05

## 2019-07-05 RX ORDER — GUAIFENESIN/DEXTROMETHORPHAN 600MG-30MG
10 TABLET, EXTENDED RELEASE 12 HR ORAL
Qty: 500 | Refills: 0
Start: 2019-07-05 | End: 2019-07-18

## 2019-07-05 RX ORDER — GUAIFENESIN/DEXTROMETHORPHAN 600MG-30MG
10 TABLET, EXTENDED RELEASE 12 HR ORAL
Refills: 0 | Status: DISCONTINUED | OUTPATIENT
Start: 2019-07-05 | End: 2019-07-05

## 2019-07-05 RX ORDER — METRONIDAZOLE 500 MG
1 TABLET ORAL
Qty: 9 | Refills: 0
Start: 2019-07-05 | End: 2019-07-07

## 2019-07-05 RX ADMIN — BUDESONIDE AND FORMOTEROL FUMARATE DIHYDRATE 2 PUFF(S): 160; 4.5 AEROSOL RESPIRATORY (INHALATION) at 05:07

## 2019-07-05 RX ADMIN — ENOXAPARIN SODIUM 80 MILLIGRAM(S): 100 INJECTION SUBCUTANEOUS at 05:07

## 2019-07-05 RX ADMIN — Medication 200 MILLIGRAM(S): at 06:20

## 2019-07-05 RX ADMIN — Medication 100 MILLIGRAM(S): at 05:06

## 2019-07-05 RX ADMIN — Medication 100 MILLIGRAM(S): at 12:42

## 2019-07-05 RX ADMIN — Medication 650 MILLIGRAM(S): at 06:35

## 2019-07-05 RX ADMIN — Medication 10 MILLIGRAM(S): at 10:23

## 2019-07-05 RX ADMIN — ONDANSETRON 4 MILLIGRAM(S): 8 TABLET, FILM COATED ORAL at 05:07

## 2019-07-05 NOTE — PROGRESS NOTE ADULT - SUBJECTIVE AND OBJECTIVE BOX
Follow-up Pulm Progress Note    No new respiratory events overnight.  Denies increased SOB, chest pain, cough or mucus. still with cough and left back pain with speaking, deep inspiration    Medications:  MEDICATIONS  (STANDING):  atorvastatin 10 milliGRAM(s) Oral at bedtime  buDESOnide  80 MICROgram(s)/formoterol 4.5 MICROgram(s) Inhaler 2 Puff(s) Inhalation two times a day  ciprofloxacin   IVPB 400 milliGRAM(s) IV Intermittent every 12 hours  dextrose 5% + sodium chloride 0.9%. 1000 milliLiter(s) (100 mL/Hr) IV Continuous <Continuous>  dicyclomine 10 milliGRAM(s) Oral four times a day before meals  enoxaparin Injectable 80 milliGRAM(s) SubCutaneous every 12 hours  metroNIDAZOLE  IVPB      metroNIDAZOLE  IVPB 500 milliGRAM(s) IV Intermittent every 8 hours  ondansetron Injectable 4 milliGRAM(s) IV Push every 6 hours  pantoprazole  Injectable 40 milliGRAM(s) IV Push at bedtime    MEDICATIONS  (PRN):  ondansetron Injectable 4 milliGRAM(s) IV Push every 6 hours PRN Nausea and/or Vomiting      Vent settings (if applicable)      Vital Signs Last 24 Hrs  T(C): 37 (05 Jul 2019 04:20), Max: 37 (05 Jul 2019 04:20)  T(F): 98.6 (05 Jul 2019 04:20), Max: 98.6 (05 Jul 2019 04:20)  HR: 82 (05 Jul 2019 04:20) (72 - 82)  BP: 113/76 (05 Jul 2019 04:20) (112/73 - 120/77)  BP(mean): --  RR: 18 (05 Jul 2019 04:20) (18 - 18)  SpO2: 92% (05 Jul 2019 04:20) (92% - 97%)          07-04 @ 07:01  -  07-05 @ 07:00  --------------------------------------------------------  IN: 1640 mL / OUT: 0 mL / NET: 1640 mL          LABS:                        11.1   6.80  )-----------( 380      ( 04 Jul 2019 09:33 )             34.3                 CAPILLARY BLOOD GLUCOSE                  CULTURES:  Culture Results:   GI PCR Results: NOT detected  *******Please Note:*******  GI panel PCR evaluates for:  Campylobacter, Plesiomonas shigelloides, Salmonella,  Vibrio, Yersinia enterocolitica, Enteroaggregative  Escherichia coli (EAEC), Enteropathogenic E.coli (EPEC),  Enterotoxigenic E. coli (ETEC) lt/st, Shiga-like  toxin-producing E. coli (STEC) stx1/stx2,  Shigella/ Enteroinvasive E. coli (EIEC), Cryptosporidium,  Cyclospora cayetanensis, Entamoeba histolytica,  Giardia lamblia, Adenovirus F 40/41, Astrovirus,  Norovirus GI/GII, Rotavirus A, Sapovirus (07-03 @ 07:04)  Culture Results:   No enteric pathogens isolated.  (Stool culture examined for Salmonella,  Shigella, Campylobacter, Aeromonas, Plesiomonas,  Vibrio, E.coli O157 and Yersinia)  No enteric gram negative rods isolated (07-02 @ 19:10)  Culture Results:   Giardia antigen negative performed by rapid immunoassay (non-enzymatic).  (It is recommended that all specimens tested by  an immunochromatographic card test be  confirmed by another method.) (07-02 @ 19:09)  Culture Results:   Testing in progress (07-02 @ 19:06)  Culture Results:   <10,000 CFU/mL Normal Urogenital Jana (07-01 @ 06:03)    Most recent blood culture -- 07-03 @ 07:04   -- -- .Stool 07-03 @ 07:04  Most recent blood culture -- 07-02 @ 19:10   -- -- .Stool 07-02 @ 19:10  Most recent blood culture -- 07-02 @ 19:09   -- -- .Stool 07-02 @ 19:09  Most recent blood culture -- 07-02 @ 19:06   -- -- .Stool 07-02 @ 19:06  Most recent blood culture -- 07-01 @ 06:03   -- -- .Urine 07-01 @ 06:03      Physical Examination:  Awake and alert, generally comfortable  HEENT: unremarkable  PULM: Clear to auscultation bilaterally, no significant sputum production  CVS: Regular rate and rhythm, no murmurs, rubs, or gallops  Abd:  soft, non tender  Extrem: No CCE    RADIOLOGY REVIEWED  CXR:    CT chest:  < from: CT Chest No Cont (07.04.19 @ 11:25) >  XAM:  CT CHEST                            PROCEDURE DATE:  07/04/2019            INTERPRETATION:  CLINICAL INFORMATION: Cough. Assess for pneumonia.    COMPARISON: None    PROCEDURE:   2 mm sections were obtained through the chest and upper abdomen.    FINDINGS:    CHEST:     LUNGS AND LARGE AIRWAYS: Patent central airways. No pulmonary masses. No   pneumonia seen.  PLEURA: Trace bilateral pleural effusions  VESSELS: Mild vascular calcification.  HEART: Heart size is normal.No pericardial effusion.  MEDIASTINUM AND WOJCIECH: No lymphadenopathy.  CHEST WALL AND LOWER NECK: Within normal limits.  VISUALIZED UPPER ABDOMEN: Abdominal ascites.  BONES: Within normal limits.    IMPRESSION: No evidence of pneumonia.      < end of copied text >

## 2019-07-05 NOTE — DISCHARGE NOTE PROVIDER - CARE PROVIDER_API CALL
Wilfredo Figueredo (MD)  Gastroenterology  [address] [facility], [address] [place] [address]  Phone: [phone]  Fax: [phone]  Follow Up Time: 1 week

## 2019-07-05 NOTE — DISCHARGE NOTE PROVIDER - NSDCFUADDAPPT_GEN_ALL_CORE_FT
Follow-up with your primary care physician within 1 week. Call for appointment. Follow-up with your primary care physician and primary rheumatologist  within 1 week. Call for appointment.

## 2019-07-05 NOTE — PROGRESS NOTE ADULT - ATTENDING COMMENTS
Agree with above NP note.  CV stable  optimized for enteroscopy tomorrow  care per surgery  ivx abx, npo  heme f/u and a/c for factor 5 le deficiency  no concern for acs, no chf  med f/u
Agree with above NP note.  cv stable  chest pain resolved  heme w/u, gi w/u ongoing   d/c plan per medicine
Patient seen and examined on AM rounds  Doing well.  Denies pain  Non-toxic appearing  vitals stable  afebrile  abd - mildly distended, soft, nontender    - no signs of bowel ischemia  - care as per medical team  - please reconsult PRN

## 2019-07-05 NOTE — PROGRESS NOTE ADULT - SUBJECTIVE AND OBJECTIVE BOX
OLEG LEO:[id number],   68yFemale followed for:  codeine (Hives; Anaphylaxis)  Dilaudid (Unknown)  fentanyl (Pruritus; Urticaria)  iodine (Anaphylaxis)    PAST MEDICAL & SURGICAL HISTORY:  Insomnia  Gastroesophageal reflux disease with esophagitis  History of osteoarthritis  Hypercholesteremia  Factor V Leiden  S/P LASIK surgery of both eyes  Intrauterine polyp  S/P D&C (status post dilation and curettage)  S/P hip replacement, right    FAMILY HISTORY:  Family history of MI (myocardial infarction)  Family history of factor V Leiden mutation: osteoarthritis    MEDICATIONS  (STANDING):  atorvastatin 10 milliGRAM(s) Oral at bedtime  buDESOnide  80 MICROgram(s)/formoterol 4.5 MICROgram(s) Inhaler 2 Puff(s) Inhalation two times a day  ciprofloxacin   IVPB 400 milliGRAM(s) IV Intermittent every 12 hours  dextrose 5% + sodium chloride 0.9%. 1000 milliLiter(s) (100 mL/Hr) IV Continuous <Continuous>  dicyclomine 10 milliGRAM(s) Oral four times a day before meals  enoxaparin Injectable 80 milliGRAM(s) SubCutaneous every 12 hours  metroNIDAZOLE  IVPB      metroNIDAZOLE  IVPB 500 milliGRAM(s) IV Intermittent every 8 hours  ondansetron Injectable 4 milliGRAM(s) IV Push every 6 hours  pantoprazole  Injectable 40 milliGRAM(s) IV Push at bedtime    MEDICATIONS  (PRN):  ondansetron Injectable 4 milliGRAM(s) IV Push every 6 hours PRN Nausea and/or Vomiting      Vital Signs Last 24 Hrs  T(C): 37 (05 Jul 2019 04:20), Max: 37 (05 Jul 2019 04:20)  T(F): 98.6 (05 Jul 2019 04:20), Max: 98.6 (05 Jul 2019 04:20)  HR: 82 (05 Jul 2019 04:20) (72 - 82)  BP: 113/76 (05 Jul 2019 04:20) (112/73 - 120/77)  BP(mean): --  RR: 18 (05 Jul 2019 04:20) (18 - 18)  SpO2: 92% (05 Jul 2019 04:20) (92% - 97%)  nc/at  s1s2  cta  soft, nt, nd no guarding or rebound  no c/c/e    CBC Full  -  ( 04 Jul 2019 09:33 )  WBC Count : 6.80 K/uL  RBC Count : 3.51 M/uL  Hemoglobin : 11.1 g/dL  Hematocrit : 34.3 %  Platelet Count - Automated : 380 K/uL  Mean Cell Volume : 97.7 fl  Mean Cell Hemoglobin : 31.6 pg  Mean Cell Hemoglobin Concentration : 32.4 gm/dL  Auto Neutrophil # : x  Auto Lymphocyte # : x  Auto Monocyte # : x  Auto Eosinophil # : x  Auto Basophil # : x  Auto Neutrophil % : x  Auto Lymphocyte % : x  Auto Monocyte % : x  Auto Eosinophil % : x  Auto Basophil % : x

## 2019-07-05 NOTE — PROGRESS NOTE ADULT - SUBJECTIVE AND OBJECTIVE BOX
CARDIOLOGY FOLLOW UP - Dr. Lloyd    CC no cp or sob      PHYSICAL EXAM:  T(C): 37 (07-05-19 @ 04:20), Max: 37 (07-05-19 @ 04:20)  HR: 82 (07-05-19 @ 04:20) (72 - 82)  BP: 113/76 (07-05-19 @ 04:20) (112/73 - 120/77)  RR: 18 (07-05-19 @ 04:20) (18 - 18)  SpO2: 92% (07-05-19 @ 04:20) (92% - 97%)  Wt(kg): --  I&O's Summary    04 Jul 2019 07:01  -  05 Jul 2019 07:00  --------------------------------------------------------  IN: 1640 mL / OUT: 0 mL / NET: 1640 mL        Appearance: Normal	  Cardiovascular: Normal S1 S2,RRR, No JVD, No murmurs  Respiratory: Lungs clear to auscultation	  Gastrointestinal:  Soft, Non-tender, + BS	  Extremities: Normal range of motion, No clubbing, cyanosis or edema        MEDICATIONS  (STANDING):  atorvastatin 10 milliGRAM(s) Oral at bedtime  buDESOnide  80 MICROgram(s)/formoterol 4.5 MICROgram(s) Inhaler 2 Puff(s) Inhalation two times a day  ciprofloxacin   IVPB 400 milliGRAM(s) IV Intermittent every 12 hours  dextrose 5% + sodium chloride 0.9%. 1000 milliLiter(s) (100 mL/Hr) IV Continuous <Continuous>  dicyclomine 10 milliGRAM(s) Oral four times a day before meals  enoxaparin Injectable 80 milliGRAM(s) SubCutaneous every 12 hours  metroNIDAZOLE  IVPB      metroNIDAZOLE  IVPB 500 milliGRAM(s) IV Intermittent every 8 hours  ondansetron Injectable 4 milliGRAM(s) IV Push every 6 hours  pantoprazole  Injectable 40 milliGRAM(s) IV Push at bedtime      TELEMETRY: 	    ECG:  	  RADIOLOGY:   DIAGNOSTIC TESTING:  [ ] Echocardiogram:  [ ]  Catheterization:  [ ] Stress Test:    OTHER: 	  < from: CT Chest No Cont (07.04.19 @ 11:25) >    CHEST:     LUNGS AND LARGE AIRWAYS: Patent central airways. No pulmonary masses. No   pneumonia seen.  PLEURA: Trace bilateral pleural effusions  VESSELS: Mild vascular calcification.  HEART: Heart size is normal.No pericardial effusion.  MEDIASTINUM AND WOJCIECH: No lymphadenopathy.  CHEST WALL AND LOWER NECK: Within normal limits.  VISUALIZED UPPER ABDOMEN: Abdominal ascites.  BONES: Within normal limits.    IMPRESSION: No evidence of pneumonia.      < end of copied text >    LABS:	 	                            11.1   6.80  )-----------( 380      ( 04 Jul 2019 09:33 )             34.3

## 2019-07-05 NOTE — CONSULT NOTE ADULT - CONSULT REASON
abdominal pain
Diarrhea
Jejunal thickening
cardiac management  hx Factor V Leiden, HLD, GERD,
h/o Factor V leiden
pleuritic chest pain, cough
Evaluation of possible autoimmune etiology of intestinal symptoms.

## 2019-07-05 NOTE — DISCHARGE NOTE PROVIDER - NSDCCPCAREPLAN_GEN_ALL_CORE_FT
PRINCIPAL DISCHARGE DIAGNOSIS  Diagnosis: Abdominal pain  Assessment and Plan of Treatment: Likely infectious enteritis/ colitis, no clot seen .  Continue with antibiotics as prescribed by your doctor.  Maintain adequate hydation, nutrition, rest, and activity as tolerated.   Follow-up with your primary care physician within 1 week. Call for appointment.        SECONDARY DISCHARGE DIAGNOSES  Diagnosis: Factor V Leiden  Assessment and Plan of Treatment: Follow-up with your primary care physician within 1 week. Call for appointment.

## 2019-07-05 NOTE — DISCHARGE NOTE NURSING/CASE MANAGEMENT/SOCIAL WORK - NSDCFUADDAPPT_GEN_ALL_CORE_FT
Follow-up with your primary care physician and primary rheumatologist  within 1 week. Call for appointment.

## 2019-07-05 NOTE — PROGRESS NOTE ADULT - ASSESSMENT
likenly infectious enteritis/ coloitis, no clot on mrv.  will discuss with dr. garrido re: d/c ac.  rheum intput pending

## 2019-07-05 NOTE — PROGRESS NOTE ADULT - ASSESSMENT
68 year old female with  h/o Factor V leiden, HLD, Gerd, presenting with abd pain     1. Abdominal Pain   CT a/p revealed  jejunal thickening secondary to possible venous bowel ischemia.   surgery team f/u , no acute surgical intervention , on abx   s/p  Enteroscopy revealed significant edema  per GI likely infectious enteritis/ coloitis, no clot on mrv; gi f/u    2. Factor V leiden  a/c per hem/onc   med f/u     3. pleuritic cp, cough  cv stable  no chest pain or sob. no decomp chf on exam  ct chest with no pna, trace pleural effusion     dvt ppx 68 year old female with  h/o Factor V leiden, HLD, Gerd, presenting with abd pain     1. Abdominal Pain   CT a/p revealed  jejunal thickening secondary to possible venous bowel ischemia.   surgery team f/u , no acute surgical intervention , on abx   s/p  Enteroscopy revealed significant edema  per GI likely infectious enteritis/ coloitis, no clot on mrv; gi f/u    2. Factor V leiden  a/c per hem/onc   med f/u     3. pleuritic cp, cough  cv stable  no chest pain or sob. no decomp chf on exam  ct chest with no pna, trace pleural effusion   resolved with Robitussin/ inhalers  pulm f.u    dvt ppx

## 2019-07-05 NOTE — PROGRESS NOTE ADULT - ASSESSMENT
68 r old woman, history as noted, including heterozygote Factor V deficiency,  KYLE- Dr Rodriguez- ( non compliant with CPAP)- who presented to the hospital with progressive right sided abdominal pain.  work up and CT findings with jejunal edema, inflammation. work up in progress- appears infectious.  The patient also notes a recent URI with residual cough, intermittent wheeze and left sided back pain, mainly pleuritic. She states symptoms are improving.  She denies increasing sob, hemoptyis, fever or other respiratory complaints.  She has refused a CTA    fortunately, pulmonary status is stable.  symptoms most consistent with post infectious bronchospasm, muscle strain from coughing and pleurisy.  will follow up on CT non-contrast for evidence of infection.   There may also be a component of referred abdominal pain    plans, work up as per GI, heme,medicine  CT chest without evidence abnormalities.  Pt refused CTA, so cannot comment on PE ( she is aware), but there is no evidence of systemic active hypercoagulability at present and symptoms are consistent with post infectious bronchospasm  dvt prophylaxis  routine gi prophylaxis as necessary  Oxygen saturation greater than 92%  incentive spirometry  continue symbicort 80 bid  incentive spirometry, ambulate to avoid atelectesis  robitussin dm prn  discussed at length with pt.  if chest discomfort progresses, may need to pursue CTA and or leg dopplers.

## 2019-07-05 NOTE — PROGRESS NOTE ADULT - PROVIDER SPECIALTY LIST ADULT
Cardiology
Cardiology
Gastroenterology
Heme/Onc
Infectious Disease
Internal Medicine
Pulmonology
Surgery
Cardiology
Internal Medicine

## 2019-07-05 NOTE — CONSULT NOTE ADULT - CONSULT REQUESTED DATE/TIME
01-Jul-2019 07:07
01-Jul-2019
01-Jul-2019 07:26
01-Jul-2019 12:50
01-Jul-2019 17:00
04-Jul-2019 13:18
05-Jul-2019 18:48

## 2019-07-05 NOTE — PROGRESS NOTE ADULT - ASSESSMENT
67 yo woman w acute abd pain started on 6/26, no diarrhea, CT findings c/w jejunal inflammation, not clear why, no sign of vascular clot, possible mesenteric ischemia, seen by heme and GI and due to h/o being possibly hypercoagulable ( heterozygous factor V deficiency ptn was on HEPARIN drip, now on sc Lovenox since 7/2,  24 hrs into the AC treatment, pain gone, nausea gone,    EGD on 7/3 shows severe jejunal edema, venous clot a concern, MRA A/P done, results show patent vessels has enteritis and colitis.  GI, Heme, Pulm (nagging cough for 3 weeks and left pleuritic CP, has H/O KYLE, noncompliant) and Cardiology following the ptn.   appetite is decreased, abdomen is softer, MRA shows no thrombosis in celiac plexus, also some area of colitis outside of enteritis, all bacterial PCR negative. D/W GI and heme: will DC LOVENOX, cont empiric ABx for 7 days, get rheum consult, advance her diet, if tolerates will DC home w outptn F/U  hypercoagulable work ordered and sent by heme, will need F/U  ptn states she has had a cough for the past 3 weeks and now at times has left pleuritic chest pain when coughing. seen by pulm, prob post viral bronchospasm, on Symbicort  she refused CTA chest to R/O PE, doesn't want any more contrast.  NON-contrast CT Chest neg.

## 2019-07-05 NOTE — DISCHARGE NOTE NURSING/CASE MANAGEMENT/SOCIAL WORK - NSDCDPATPORTLINK_GEN_ALL_CORE
You can access the [facility] Portal, offered by [facility], by registering with the following website: [email or web address]

## 2019-07-05 NOTE — PROGRESS NOTE ADULT - SUBJECTIVE AND OBJECTIVE BOX
Patient is a 68y old  Female who presents with a chief complaint of abdominal pain (05 Jul 2019 13:59)      SUBJECTIVE / OVERNIGHT EVENTS: appetite is decreased, abdomen is softer, MRA shows no thrombosis in celiax plexus, also some area of colitis outside of enteritis, all bacterial PCR negative. D/W GI and heme: will DC LOVENOX, cont empiric ABx for 7 days, get rheum consult, advance her diet, if tolerates will DC home w outptn F/U    MEDICATIONS  (STANDING):  atorvastatin 10 milliGRAM(s) Oral at bedtime  buDESOnide  80 MICROgram(s)/formoterol 4.5 MICROgram(s) Inhaler 2 Puff(s) Inhalation two times a day  ciprofloxacin   IVPB 400 milliGRAM(s) IV Intermittent every 12 hours  dextrose 5% + sodium chloride 0.9%. 1000 milliLiter(s) (100 mL/Hr) IV Continuous <Continuous>  metroNIDAZOLE  IVPB      metroNIDAZOLE  IVPB 500 milliGRAM(s) IV Intermittent every 8 hours  pantoprazole    Tablet 40 milliGRAM(s) Oral before breakfast    MEDICATIONS  (PRN):  guaiFENesin/dextromethorphan  Syrup 10 milliLiter(s) Oral four times a day PRN Cough      Vital Signs Last 24 Hrs  T(F): 98.3 (07-05-19 @ 12:07), Max: 98.6 (07-05-19 @ 04:20)  HR: 78 (07-05-19 @ 12:07) (78 - 82)  BP: 141/82 (07-05-19 @ 12:07) (113/76 - 141/82)  RR: 18 (07-05-19 @ 12:07) (18 - 18)  SpO2: 97% (07-05-19 @ 12:07) (92% - 97%)  Telemetry:   CAPILLARY BLOOD GLUCOSE        I&O's Summary    04 Jul 2019 07:01  -  05 Jul 2019 07:00  --------------------------------------------------------  IN: 1640 mL / OUT: 0 mL / NET: 1640 mL        PHYSICAL EXAM:  GENERAL: NAD, well-developed  HEAD:  Atraumatic, Normocephalic  EYES: EOMI, PERRLA, conjunctiva and sclera clear  NECK: Supple, No JVD  CHEST/LUNG: Clear to auscultation bilaterally; No wheeze  HEART: Regular rate and rhythm; No murmurs, rubs, or gallops  ABDOMEN: Soft, Nontender, Nondistended; Bowel sounds present  EXTREMITIES:  2+ Peripheral Pulses, No clubbing, cyanosis, or edema  PSYCH: AAOx3  NEUROLOGY: non-focal  SKIN: No rashes or lesions    LABS:                        11.3   7.57  )-----------( 346      ( 05 Jul 2019 10:53 )             35.9                     RADIOLOGY & ADDITIONAL TESTS:    Imaging Personally Reviewed:    Consultant(s) Notes Reviewed:      Care Discussed with Consultants/Other Providers:

## 2019-07-05 NOTE — PROGRESS NOTE ADULT - SUBJECTIVE AND OBJECTIVE BOX
HPI  68 year old woman with h/o FVL heterozygosity discovered after sister developed thrombosis found FVL  no h/o thrombosis  now here with abd episode thought ischemia vs infectious  pmh sh fh unchanged 7 pt ros negative  physical  comfortable  vs t98.6 82 113/76 18 92 sat  lungs clear  cor s1s2a  abd soft nontender  ext no edema    data wbc 6800 hgb 11 hct 34 plt [id number] ct chest no pneumonia  mri abdomen edema no vascular compromise

## 2019-07-05 NOTE — PROGRESS NOTE ADULT - ASSESSMENT
Jejunal edema   mesenteric ischemia in differential but no definite evidence of thrombosis, await pathology  Likely no definite role for anticoagulation, but await final GI decision  h/o FVL no thrombosis

## 2019-07-05 NOTE — PROGRESS NOTE ADULT - ASSESSMENT
69 yo woman who was in her USOH developed severe RLQ and RUQ abd pain 5 days ago, associated w anorexia nausea, NO diarrhea, no vomiting, no chills, no change in BMs, saw her GI doc a day after and was prescribed CIPRO for an infection, but ptn w no diarrhea, n/v. 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 Marquez) Ptn states pain level is 10/10. CT A/P c/w severe intramural jejunal edema w mesenteric edema, suggestive of mesenteric ischemia, all mesenteric vessels are patent, seen by GI, Surgery, Heme, placed on Flagyl, IV Heparin (01 Jul 2019 12:07)    ER vss, afebrile.  WBC 10.7 --> 9.7.  UA (-) nit/LE.  CTap s/o mesenteric ischemia and possible venous clot, now on heparin gtt.  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  Pt states she has had giardia in the past, and her current symptoms are very similar to when she had giardia infection.  No change in dietary habits, no sick contacts.    Pt c/o feeling bloated, has not had a BM for 2-3 days.      ID consult called for further abx managment.      r/o infectious enteritits:    - Findings s/o mesenteric ischemia.  Thickening and edema seen in the jejunum and mesenteric edema noted on CT imaging. Pt started on heparin gtt for anticoagulation.    - r/o infectious etiologies.   Negative stool cx, O&P, GI pcr, giardia Ag (thus far negative).   Cont flagyl + cipro on empiric basis.  Complete 7 day course.      - s/p EGD:  found to have extensive jejunal edema. MRV negative  for venous clot.  Pt with small bowel enteritis and transverse/descending colitis.  Pt with formed stool today.  Cont abx x 7 days on empiric basis to cover for infectious enteritis.      - Path report shows small bowel mucosa with no diagnostic alteration.      Pt for d/c home today, overall feels better.          Paty Bennett  [phone]

## 2019-07-05 NOTE — CONSULT NOTE ADULT - SUBJECTIVE AND OBJECTIVE BOX
OLEG LEO  [id number]    HISTORY OF PRESENT ILLNESS:    69yo F with PMHx GERD, OA, HLD and + FVL-heterozygotic who presented to the ED for evaluation of abdominal pain, nausea and anorexia. On initial evaluation her symptoms were attribute to mesenteric ischemia due to initial interpretation of imagining studies. she was started on AC and subsequently an MRV on the abdomen revealed normal mesenteric vasculature. She also had an endoscopy with biopsy of the small bowel showing unremarkable small bowel tissue. her symptoms improved, with minimal nausea, abdominal pain and improved appetite. rheumatology was consulted for evaluation of relationship of patient symptoms and possible autoimmune etiology.    the patient reports having and extensive workup for possible sjogren syndrome after she presented to her doctor for evaluation of dry mouth and dry eyes. the evaluation was remarkable for positive юлия. after her assessment she was told that her symptoms were minimal and did not meet criteria for an autoimmune disease. She reported that her eye symptoms resolved but she has been having dry mouth intermittently.      Regarding the FVL, she reported that her sister and her tested positive (heterozygote) but denies having any thrombotic event on her family or personal history related the presence of FVL.       PAST MEDICAL & SURGICAL HISTORY:  Insomnia  Gastroesophageal reflux disease with esophagitis  History of osteoarthritis  Hypercholesteremia  Factor V Leiden  S/P LASIK surgery of both eyes  Intrauterine polyp  S/P D&C (status post dilation and curettage)  S/P hip replacement, right      Review of Systems:  Gen:  No fevers/chills, weight loss  HEENT: No blurry vision, no difficulty swallowing, no oral or nasal ulcers  CVS: No chest pain/palpitations  Resp: No SOB/wheezing  GI: No N/V/C/D/abdominal pain  MSK: Negative  Skin: No new rashes  Neuro: No headaches    MEDICATIONS  (STANDING):  atorvastatin 10 milliGRAM(s) Oral at bedtime  buDESOnide  80 MICROgram(s)/formoterol 4.5 MICROgram(s) Inhaler 2 Puff(s) Inhalation two times a day  ciprofloxacin   IVPB 400 milliGRAM(s) IV Intermittent every 12 hours  dextrose 5% + sodium chloride 0.9%. 1000 milliLiter(s) (100 mL/Hr) IV Continuous <Continuous>  lactobacillus acidophilus 1 Tablet(s) Oral two times a day  metroNIDAZOLE  IVPB      metroNIDAZOLE  IVPB 500 milliGRAM(s) IV Intermittent every 8 hours  pantoprazole    Tablet 40 milliGRAM(s) Oral before breakfast    MEDICATIONS  (PRN):  guaiFENesin/dextromethorphan  Syrup 10 milliLiter(s) Oral four times a day PRN Cough      Allergies    codeine (Hives; Anaphylaxis)  Dilaudid (Unknown)  fentanyl (Pruritus; Urticaria)  iodine (Anaphylaxis)    Intolerances        PERTINENT MEDICATION HISTORY:    SOCIAL HISTORY:  OCCUPATION:  TRAVEL HISTORY:    FAMILY HISTORY:  Family history of MI (myocardial infarction)  Family history of factor V Leiden mutation: osteoarthritis      Vital Signs Last 24 Hrs  T(C): 36.8 (05 Jul 2019 18:11), Max: 37 (05 Jul 2019 04:20)  T(F): 98.2 (05 Jul 2019 18:11), Max: 98.6 (05 Jul 2019 04:20)  HR: 82 (05 Jul 2019 18:11) (78 - 82)  BP: 129/76 (05 Jul 2019 18:11) (113/76 - 141/82)  BP(mean): --  RR: 18 (05 Jul 2019 18:11) (18 - 18)  SpO2: 95% (05 Jul 2019 18:11) (92% - 97%)    Physical Exam:  General: No apparent distress  HEENT: EOMI, MMM  CVS: +S1/S2, RRR, no murmurs/rubs/gallops  Resp: CTA b/l. No crackles/wheezing  GI: Soft, NT/ND +BS  MSK: normal muscle strength, ROM, no evident MSK abnormality  Neuro: AAOx3  Skin: no visible rashes    LABS:                        11.3   7.57  )-----------( 346      ( 05 Jul 2019 10:53 )             35.9                 RADIOLOGY & ADDITIONAL STUDIES:    MRI Abdomen    < from: MR Abdomen w/ IV Cont (07.03.19 @ 17:36) >  IMPRESSION:     Enterocolitis as above with marked small bowel wall thickening and edema,   etiology unclear.    Small volume ascites.    Mesenteric vasculature is patent.      Biopsy  Final Diagnosis  Small bowel, biopsy  - Small bowel mucosa with no significant diagnostic  alterations. OLEG LEO  [id number]    HISTORY OF PRESENT ILLNESS:    67yo F with PMHx GERD, OA, HLD and + FVL-heterozygotic who presented to the ED for evaluation of abdominal pain, nausea and anorexia. On initial evaluation her symptoms were attribute to mesenteric ischemia due to initial interpretation of imagining studies. she was started on AC and subsequently an MRV on the abdomen revealed normal mesenteric vasculature. She also had an endoscopy with biopsy of the small bowel showing unremarkable small bowel tissue. her symptoms improved, with minimal nausea, abdominal pain and improved appetite. rheumatology was consulted for evaluation of relationship of patient symptoms and possible autoimmune etiology.    the patient reports having and extensive workup for possible sjogren syndrome after she presented to her doctor for evaluation of dry mouth and dry eyes. the evaluation was remarkable for positive юлия. after her assessment she was told that her symptoms were minimal and did not meet criteria for an autoimmune disease. She reported that her eye symptoms resolved but she has been having dry mouth intermittently.  She cries with tears but cannot eat crackers dry.      Regarding the FVL, she reported that her sister and her tested positive (heterozygote) but denies having any thrombotic event on her family or personal history related the presence of FVL.       PAST MEDICAL & SURGICAL HISTORY:  Insomnia  Gastroesophageal reflux disease with esophagitis  History of osteoarthritis  Hypercholesteremia  Factor V Leiden  S/P LASIK surgery of both eyes  Intrauterine polyp  S/P D&C (status post dilation and curettage)  S/P hip replacement, right      Review of Systems:  Gen:  No fevers/chills, weight loss  HEENT: No blurry vision, no difficulty swallowing, no oral or nasal ulcers  CVS: No chest pain/palpitations  Resp: No SOB/wheezing  GI: No N/V/C/D/abdominal pain  MSK: Negative  Skin: No new rashes  Neuro: No headaches    MEDICATIONS  (STANDING):  atorvastatin 10 milliGRAM(s) Oral at bedtime  buDESOnide  80 MICROgram(s)/formoterol 4.5 MICROgram(s) Inhaler 2 Puff(s) Inhalation two times a day  ciprofloxacin   IVPB 400 milliGRAM(s) IV Intermittent every 12 hours  dextrose 5% + sodium chloride 0.9%. 1000 milliLiter(s) (100 mL/Hr) IV Continuous <Continuous>  lactobacillus acidophilus 1 Tablet(s) Oral two times a day  metroNIDAZOLE  IVPB      metroNIDAZOLE  IVPB 500 milliGRAM(s) IV Intermittent every 8 hours  pantoprazole    Tablet 40 milliGRAM(s) Oral before breakfast    MEDICATIONS  (PRN):  guaiFENesin/dextromethorphan  Syrup 10 milliLiter(s) Oral four times a day PRN Cough      Allergies    codeine (Hives; Anaphylaxis)  Dilaudid (Unknown)  fentanyl (Pruritus; Urticaria)  iodine (Anaphylaxis)    Intolerances        PERTINENT MEDICATION HISTORY:    SOCIAL HISTORY:  OCCUPATION: 	Psychologist  TRAVEL HISTORY:    FAMILY HISTORY:  Family history of MI (myocardial infarction)  Family history of factor V Leiden mutation: osteoarthritis      Vital Signs Last 24 Hrs  T(C): 36.8 (05 Jul 2019 18:11), Max: 37 (05 Jul 2019 04:20)  T(F): 98.2 (05 Jul 2019 18:11), Max: 98.6 (05 Jul 2019 04:20)  HR: 82 (05 Jul 2019 18:11) (78 - 82)  BP: 129/76 (05 Jul 2019 18:11) (113/76 - 141/82)  BP(mean): --  RR: 18 (05 Jul 2019 18:11) (18 - 18)  SpO2: 95% (05 Jul 2019 18:11) (92% - 97%)    Physical Exam:  General: No apparent distress  HEENT: EOMI, MMM but poor salivary pooling - no oral ulcers, lacrimal glands not enlarged  CVS: +S1/S2, RRR, no murmurs/rubs/gallops  Resp: CTA b/l. No crackles/wheezing  GI: Soft, NT/ND +BS  MSK: normal muscle strength, ROM, no evident MSK abnormality  Neuro: AAOx3  Skin: no visible rashes, warm and without skin tightening    LABS:                        11.3   7.57  )-----------( 346      ( 05 Jul 2019 10:53 )             35.9                 RADIOLOGY & ADDITIONAL STUDIES:    MRI Abdomen    < from: MR Abdomen w/ IV Cont (07.03.19 @ 17:36) >  IMPRESSION:     Enterocolitis as above with marked small bowel wall thickening and edema,   etiology unclear.    Small volume ascites.    Mesenteric vasculature is patent.      Biopsy  Final Diagnosis  Small bowel, biopsy  - Small bowel mucosa with no significant diagnostic  alterations.

## 2019-07-05 NOTE — DISCHARGE NOTE PROVIDER - HOSPITAL COURSE
69 yo woman with PMH of Factor V Leiden deficiency, and KYLE noncompliant with CPAP,  who presented with acute abdominal pain  started on 6/26, no diarrhea, CT findings consistent with  jejunal inflammation, not clear why, no sign of vascular clot, possible mesenteric ischemia, seen by Heme and GI and due to history of  possible hypercoagulable ( heterozygous factor V deficiency. Patient  was on HEPARIN drip, now on subcutaneous Lovenox since 7/2,  24 hrs into the AC treatment, pain gone, nausea gone,      EGD on 7/3 shows severe jejunal edema, venous clot a concern, MRV done, results pending. Hypercoagulable work up per Heme in pt with Factor V heterozygous deficiency. Patient states she has had a cough for the past 3 weeks and now at times has left pleuritic chest pain when coughing.     Patient  refused CTA chest to R/O PE as she doesn't want any more contrast.However, pt agrees to NON-contrast CT Chest.  Consulted Pulmonologist Dr. Rodriguez's group  who patient  sees for her KYLE, but noncompliant w CPAP.      change clears to full liquid diet, cont IV protonix,        GI followed. Patient treated with IV Cipro and IV Flagyl for possible jejunal inflammation /infectious enteritis/colitis.. Pt received IV PPI, IV hydration. All PCRs are negative. ,MRV negative for clot. Lovenox disconfinued.   Rheumatology consulted to evaluate for autoimmune disease in setting of elevated CRP, edema in jejunum, history of abnormal JULIANA.        Pt improved on IV cipro and Flagyl, tolerating regular diet. Pt is cleared  by attending for discharge. Pt will continue on Cipro and Flagyl for total of 7 days, and follow-up with her PMD within 1 week. 69 yo woman with PMH of Factor V Leiden deficiency, and KYLE noncompliant with CPAP,  who presented with acute abdominal pain  started on 6/26, no diarrhea, CT findings consistent with  jejunal inflammation, not clear why, no sign of vascular clot, possible mesenteric ischemia, seen by Heme and GI and due to history of  possible hypercoagulable ( heterozygous factor V deficiency. Patient  was on HEPARIN drip, now on subcutaneous Lovenox since 7/2,  24 hrs into the AC treatment, pain gone, nausea gone,      EGD on 7/3 shows severe jejunal edema, venous clot a concern, MRV done, results pending. Hypercoagulable work up per Heme in pt with Factor V heterozygous deficiency. Patient states she has had a cough for the past 3 weeks and now at times has left pleuritic chest pain when coughing.     Patient  refused CTA chest to R/O PE as she doesn't want any more contrast.However, pt agrees to NON-contrast CT Chest.  Consulted Pulmonologist Dr. Rodriguez's group  who patient  sees for her KYLE, but noncompliant w CPAP.      change clears to full liquid diet, cont IV protonix,        GI followed. Patient treated with IV Cipro and IV Flagyl for possible jejunal inflammation /infectious enteritis/colitis.. Pt received IV PPI, IV hydration. All PCRs are negative. ,MRV negative for clot. Lovenox disconfinued.   Rheumatology consulted to evaluate for autoimmune disease in setting of elevated CRP, edema in jejunum, history of abnormal JULIANA. No further inpatient workup per Rheumatologist.  Pt will follow-up with her primary rheumatologist .        Pt improved on IV cipro and Flagyl, tolerating regular diet. Pt is cleared  by attending for discharge. Pt will continue on Cipro and Flagyl for total of 7 days, and follow-up with her PMD within 1 week.

## 2019-07-05 NOTE — CONSULT NOTE ADULT - ASSESSMENT
67yo F with PMHx GERD, OA who presented to the hospital for evaluation of abdominal pain. currently abdominal pain resolved and imaging studies were negative for mesenteric ischemia but revealed evidence of colitis. initial symptoms likely related to colitis and less likely a vascular event. During this admission JULIANA test high with a titer of 1 : 34046.      Positive JULIANA/ Improving GI symptoms/negative imaging for ischemia and positive for colitis:  Very high JULIANA titers but unlikely to explain clinical presentation. prior history non consistent with prior autoimmune and currently illness improving.  The patient can continue with rheum follow up for monitoring of possible developed of symptoms related to an autoimmune condition in the setting of very elevated titers of JULIANA.  Unclear if colitis represent a autoimmune condition as it is currently improving with current therapy/antibiotics. 67yo F with PMHx GERD, OA who presented to the hospital for evaluation of abdominal pain. currently abdominal pain resolved and imaging studies were negative for mesenteric ischemia but revealed evidence of colitis. initial symptoms likely related to colitis and less likely a vascular event. During this admission JULIANA test high with a titer of 1 : 48681.      Positive JULIANA/ Improving GI symptoms/negative imaging for ischemia and positive for colitis:  Very high JULIANA titers but unlikely to explain clinical presentation. prior history non consistent with autoimmune disease and currently illness improving.  The patient can continue with rheum follow up for monitoring of possible development of symptoms related to an autoimmune condition in the setting of very elevated titers of JULIANA.  Unclear if colitis represent an autoimmune condition as it is currently improving with current therapy/antibiotics.  Continue with GI and ID recommendations       Agapito Dempsey  Rheumatolgy Fellow  D/w Dr. Mark 67yo F with PMHx GERD, OA who presented to the hospital for evaluation of abdominal pain. currently abdominal pain resolved and imaging studies were negative for mesenteric ischemia but revealed evidence of colitis. initial symptoms likely related to colitis and less likely a vascular event. During this admission JULIANA test high with a titer of 1 : 63855, centromere pattern      Positive JULIANA/ Improving GI symptoms/negative imaging for ischemia and positive for colitis:  Very high JULIANA titers but unlikely to explain clinical presentation. prior history non consistent with autoimmune disease and currently illness improving.  The patient can continue with rheum follow up for monitoring of possible development of symptoms related to an autoimmune condition in the setting of very elevated titers of JULIANA  As patient being discharged subserologies and further investigation can occur as outpatient  Unclear if colitis represent an autoimmune condition as it is currently improving with current therapy/antibiotics.  Continue with GI and ID recommendations       Agapito Dempsey  Rheumatolgy Fellow  D/w Dr. Mark

## 2019-07-05 NOTE — PROGRESS NOTE ADULT - SUBJECTIVE AND OBJECTIVE BOX
Infectious Diseases progress note:    Subjective: Overall feels better.  Still with abd bloating.  No n/v.  No diarrhea.  Had formed stool today.  Tolerating PO.  MRV (-) for venous clot.  Afebrile.     ROS:  CONSTITUTIONAL:  No fever, chills, rigors  CARDIOVASCULAR:  No chest pain or palpitations  RESPIRATORY:   No SOB, cough, dyspnea on exertion.  No wheezing  GASTROINTESTINAL:  No abd pain, N/V, diarrhea/constipation  EXTREMITIES:  No swelling or joint pain  GENITOURINARY:  No burning on urination, increased frequency or urgency.  No flank pain  NEUROLOGIC:  No HA, visual disturbances  SKIN: No rashes    Allergies    codeine (Hives; Anaphylaxis)  Dilaudid (Unknown)  fentanyl (Pruritus; Urticaria)  iodine (Anaphylaxis)    Intolerances        ANTIBIOTICS/RELEVANT:  antimicrobials  ciprofloxacin   IVPB 400 milliGRAM(s) IV Intermittent every 12 hours  metroNIDAZOLE  IVPB      metroNIDAZOLE  IVPB 500 milliGRAM(s) IV Intermittent every 8 hours    immunologic:    OTHER:  atorvastatin 10 milliGRAM(s) Oral at bedtime  buDESOnide  80 MICROgram(s)/formoterol 4.5 MICROgram(s) Inhaler 2 Puff(s) Inhalation two times a day  dextrose 5% + sodium chloride 0.9%. 1000 milliLiter(s) IV Continuous <Continuous>  guaiFENesin/dextromethorphan  Syrup 10 milliLiter(s) Oral four times a day PRN  lactobacillus acidophilus 1 Tablet(s) Oral two times a day  pantoprazole    Tablet 40 milliGRAM(s) Oral before breakfast      Objective:  Vital Signs Last 24 Hrs  T(C): 36.8 (05 Jul 2019 12:07), Max: 37 (05 Jul 2019 04:20)  T(F): 98.3 (05 Jul 2019 12:07), Max: 98.6 (05 Jul 2019 04:20)  HR: 78 (05 Jul 2019 12:07) (78 - 82)  BP: 141/82 (05 Jul 2019 12:07) (113/76 - 141/82)  BP(mean): --  RR: 18 (05 Jul 2019 12:07) (18 - 18)  SpO2: 97% (05 Jul 2019 12:07) (92% - 97%)    PHYSICAL EXAM:  Constitutional:NAD  Eyes:MARTA, EOMI  Ear/Nose/Throat: no thrush, mucositis.  Moist mucous membranes	  Neck:no JVD, no lymphadenopathy, supple  Respiratory: CTA eri  Cardiovascular: S1S2 RRR, no murmurs  Gastrointestinal:soft, nontender,  nondistended (+) BS  Extremities:no e/e/c  Skin:  no rashes, open wounds or ulcerations        LABS:                        11.3   7.57  )-----------( 346      ( 05 Jul 2019 10:53 )             35.9           MICROBIOLOGY:    GI PCR Panel, Stool (07.03.19 @ 07:04)    Culture Results:   GI PCR Results: NOT detected  *******Please Note:*******  GI panel PCR evaluates for:  Campylobacter, Plesiomonas shigelloides, Salmonella,  Vibrio, Yersinia enterocolitica, Enteroaggregative  Escherichia coli (EAEC), Enteropathogenic E.coli (EPEC),  Enterotoxigenic E. coli (ETEC) lt/st, Shiga-like  toxin-producing E. coli (STEC) stx1/stx2,  Shigella/ Enteroinvasive E. coli (EIEC), Cryptosporidium,  Cyclospora cayetanensis, Entamoeba histolytica,  Giardia lamblia, Adenovirus F 40/41, Astrovirus,  Norovirus GI/GII, Rotavirus A, Sapovirus    Culture - Stool (07.02.19 @ 19:10)    Specimen Source: .Stool    Culture Results:   No enteric pathogens isolated.  (Stool culture examined for Salmonella,  Shigella, Campylobacter, Aeromonas, Plesiomonas,  Vibrio, E.coli O157 and Yersinia)  No enteric gram negative rods isolated    Giardia lamblia Antigen, Stool . (07.02.19 @ 19:09)    Culture Results:   Giardia antigen negative performed by rapid immunoassay (non-enzymatic).  (It is recommended that all specimens tested by  an immunochromatographic card test be  confirmed by another method.)    Ova and Parasites (07.02.19 @ 19:06)    Specimen Source: .Stool    Culture Results:   No Protozoa seen by trichrome stain  No Helminths or Protozoa seen in formalin concentrate  performed by iodine stain  (routine O+P not evaluated for Microsporidia,  Cryptosporidia, Cyclospora, or Isospora.)  Note: One negative specimen does not rule  out the possibility of a parasitic infection.          RADIOLOGY & ADDITIONAL STUDIES:    < from: CT Chest No Cont (07.04.19 @ 11:25) >  FINDINGS:    CHEST:     LUNGS AND LARGE AIRWAYS: Patent central airways. No pulmonary masses. No   pneumonia seen.  PLEURA: Trace bilateral pleural effusions  VESSELS: Mild vascular calcification.  HEART: Heart size is normal.No pericardial effusion.  MEDIASTINUM AND WOJCIECH: No lymphadenopathy.  CHEST WALL AND LOWER NECK: Within normal limits.  VISUALIZED UPPER ABDOMEN: Abdominal ascites.  BONES: Within normal limits.    IMPRESSION: No evidence of pneumonia.    < end of copied text >        < from: MR Abdomen w/ IV Cont (07.03.19 @ 17:36) >    PROCEDURE:   MRI of the abdomen was performed with and without intravenous contrast.  IV Contrast: Gadavist. 10 cc administered, 0 cc discarded.      FINDINGS:    LOWER CHEST: Within normal limits.    LIVER: Within normal limits.   BILE DUCTS: Normal caliber.  GALLBLADDER: Within normal limits.  SPLEEN: Within normal limits.  PANCREAS: Within normal limits.  ADRENALS: Within normal limits.  KIDNEYS/URETERS: Within normal limits.    VISUALIZED PORTIONS:    BOWEL: Long segment small bowel enteritis as evidenced by marked   thickening and edema as well as a colitis involving the transverse and   left colon without significant change from prior study 6/30/2019.   etiology is unclear.   PERITONEUM: Small ascites without change.  VESSELS: Celiac axis, SMA and JE are widelypatent as are the SMV and   portal veins.  RETROPERITONEUM: No lymphadenopathy.    ABDOMINAL WALL: Within normal limits.  BONES: Within normal limits.    IMPRESSION:     Enterocolitis as above with marked small bowel wall thickening and edema,   etiology unclear.    Small volume ascites.    Mesenteric vasculature is patent.    Findings are not significantly changed from prior study 6/30/2019.    < end of copied text >        < from: CT Abdomen and Pelvis w/ IV Cont (06.30.19 @ 23:43) >    Marked thickening and intramural edema involving the jejunum.    Marked  mesenteric edema and interloop fluid. Appearance suggests venous bowel   ischemia.  No pneumatosis or portal venous gas. The superior mesenteric   vein and superior mesenteric artery appear patent proximally.  Findings   discussed with Dr. Carlson at1:10 am on 7/1/19 with [facility].    < end of copied text >

## 2019-07-08 LAB
DRVVT SCREEN TO CONFIRM RATIO: SIGNIFICANT CHANGE UP
LA NT DPL PPP QL: 40.1 SEC — SIGNIFICANT CHANGE UP
NORMALIZED SCT PPP-RTO: 0.64 RATIO — SIGNIFICANT CHANGE UP (ref 0–1.16)
NORMALIZED SCT PPP-RTO: SIGNIFICANT CHANGE UP
PROT C ACT/NOR PPP: 79 % — SIGNIFICANT CHANGE UP (ref 74–150)
PROT S FREE AG PPP IA-ACNC: 63 % — SIGNIFICANT CHANGE UP (ref 61–131)

## 2019-07-11 LAB — PROT S FREE PPP-ACNC: 66 % NORMAL — SIGNIFICANT CHANGE UP (ref 60–140)

## 2019-07-12 LAB — PTR INTERPRETATION: SIGNIFICANT CHANGE UP

## 2020-01-01 NOTE — PATIENT PROFILE ADULT - NSPROMUTINFOINDIVIDFT_GEN_A_NUR
Nursery  Record    Subjective:     Bob Temple is a male infant born on 2021 at 11:02 AM . He weighed  3.05 kg and measured 19\" in length. Apgars were 9 and 9. Presentation was  Vertex    Maternal Data:       Rupture Date: 2020  Rupture Time: 11:02 AM  Delivery Type: , Low Transverse   Delivery Resuscitation: Tactile Stimulation;Suctioning-bulb    Number of Vessels: 3 Vessels    Cord Events: None  Meconium Stained: None  Amniotic Fluid Description: Clear     Information for the patient's mother:  [place] [[id number]]   Gestational Age: 41w4d   Prenatal Labs:  Lab Results   Component Value Date/Time    ABO/Rh(D) Joselo Sellersta POSITIVE 2020 08:27 AM        Hep B neg/HIV bryant/RPRNR/RI/GC neg/Chl neg  GBS+ - no antibiotics prior to delivery (scheduled C/S)    Mother with gestational diabetes - diet-controlled      Objective:     Visit Vitals  Pulse 156   Temp 98.2 °F (36.8 °C)   Resp 36   Ht 48.3 cm   Wt 3.07 kg   HC 32 cm   BMI 13.18 kg/m²       Results for orders placed or performed during the hospital encounter of 20   BILIRUBIN, TOTAL   Result Value Ref Range    Bilirubin, total 4.2 <7.2 MG/DL   GLUCOSE, POC   Result Value Ref Range    Glucose (POC) 65 50 - 110 mg/dL    Performed by Lilo Mchugh    GLUCOSE, POC   Result Value Ref Range    Glucose (POC) 57 50 - 110 mg/dL    Performed by [facility]    GLUCOSE, POC   Result Value Ref Range    Glucose (POC) 65 50 - 110 mg/dL    Performed by [facility] BLOOD EVALUATION   Result Value Ref Range    ABO/Rh(D) B POSITIVE     SHARRI IgG NEG     Bilirubin if SHARRI pos: IF DIRECT ANABELLE POSITIVE, BILIRUBIN TO FOLLOW       No results found for this or any previous visit (from the past 24 hour(s)). No data found. No data found. Feeding Method Used:  Bottle, Breast feeding  Breast Milk: Nursing  Formula: Yes  Formula Type: Similac Pro-Advance  Reason for Formula Supplementation : Mother's choice    Physical Exam:    Code for table:  O No abnormality  X Abnormally (describe abnormal findings) Admission Exam  CODE Admission Exam  Description of  Findings 12/3 Exam  CODE Exam  Description of  Findings 12/4 Exam Code Exam  Description of  Findings   General Appearance O Alert and active 0 Alert and active. Well appearing. O Healthy appearing term male infant in no apparent distress   Skin O No lesions noted 0 Pink and intact. Well perfused. O Warm, pink, smooth, good skin turgor, mild jaundice visible   Head, Neck O AFOS\" sutures opposed   0 AF soft and flat O Normocephalic without molding, AFOSF   Eyes O RR bilat 0 +RR bilat O Normal placement, red reflex present bilaterally   Ears, Nose, & Throat O Palate intact 0 Palate intact O Ears are in normal placement; nose placed midline; palate intact   Thorax O No crepitus 0 wnl O Clavicles intact, normal chest shape   Lungs O CTA bilaterally 0 CTA O Clear and equal bilaterally, no grunting or retracting   Heart O RRR w/o murmur 0 No murmur, NSR, pulses wnl O Pink, without murmur, capillary refill time < 3 seconds   Abdomen O S/NT/ND; no HSM or masses 0 Soft with active bowel sounds. Stooling. O Soft, 3 vessel cord present, bowel sounds audible   Genitalia O nml male; yesyes descended bilat 0 Term male- meatus central, testes descended bilat O Normal uncircumcised male genitalia with descended testes, rugae prominent   Anus O present 0 patent O Patent   Trunk and Spine O Straight and intact 0 wnl O No sacral dimples or moises of hair   Extremities O FROM x 4; no evidence of hip instability 0 FROM Z6I, No hip clicks/clunks O FROM x 4; negative Mcallister/Ortolani maneuvers   Reflexes O Nm; for age 0 + suck/grasp/rickey O Normal tone, root, palmar grasp, rickey and suck reflex present   Examiner  Erin Cisneros MD 2020  Maryuri LYON  2020  0515  CAMRON Tran-BC     Code for table:  O No abnormality  X Abnormally (describe abnormal findings) 12/5 Exam  CODE Exam  Description of  Findings   General Appearance 0 NAD, alert and active   Skin 0 Pink, no lesions   Head, Neck 0 AFSOF, neck supple   Eyes 0 RLR   Ears, Nose, & Throat 0 Palate intact   Thorax 0 Symmetrical   Lungs 0 CTAB   Heart 0 No audible murmur, pulses and perfusion wnl   Abdomen 0 Soft, non distended   Genitalia 0 Nl male, testes down   Anus 0 patent   Trunk and Spine 0 No sacral dimple or hair tuft   Extremities 0 No hip click or clunk. FROM   Reflexes 0 [place], suck and grasp reflexes intact   Examiner  AYSHA Ceja NNP BC      Discharge Exam Code for table:  O = No abnormality  X = Abnormally  Description of  Findings   General Appearance 0 Alert, active, pink   Skin 0 No rash / lesion, without jaundice   Head, Neck 0 Anterior fontanelle open, soft, & flat   Eyes 0 Red reflex present bilaterally   Ears, Nose, & Throat 0 Palate intact   Thorax 0 Symmetric, clavicles without deformity or crepitus   Lungs 0 Clear to auscultation   Heart 0 No murmur, pulses 2+ / equal, regular rate and rhythm, Capillary refill < 3 seconds.    Abdomen 0 Soft, bowel sounds present   Genitalia 0 Normal male external, testes descended bilaterally   Anus 0 Patent    Trunk and Spine 0 No dimple or hair tuft observed   Extremities 0 Full range of motion x 4, no hip click   Reflexes 0 + suck, symmetric rickey, bilateral grasp   Examiner  Leonid Evans PA-C  2020 at 7:03 AM       Immunization History   Administered Date(s) Administered    Hep B, Adol/Ped 2020       Hearing Screen:  Hearing Screen: Yes (20 1100)  Left Ear: Pass (20 1100)  Right Ear: Pass (45/15/85 6425)    Metabolic Screen:  Initial  Screen Completed: Yes (20 0423)     CHD Oxygen Saturation Screening:  Pre Ductal O2 Sat (%): 100  Post Ductal O2 Sat (%): 100    Assessment/Plan:     Active Problems:    Liveborn infant, of kwon pregnancy, born in hospital by  delivery (2020)         Impression on admission: Term  male infant del. By repeat C/S. Mother with A1 GDM. Pregnancy otherwise uncomplicated. Infant well-appearing on exam.  A/P: Term  male infant. Routine NB care. Nicole Cisneros MD 2020 1500    Progress Note: Male Devaughn Solo is a 3 day old male, doing well. Weight 3.085 kg (down 1% from BW). Vitals stable / wnl. Void x 1, stool x 2 over past 24 hours. Breast and formula feeding, taking 8-20mL each feeding. Normal physical exam.  Plan: Continue routine NBN care. Parents updated and agree with plan. Questions answered / acknowledged. Terriesol Gaona, [facility] 20 @ 0600    Progress Note: Well appearing, term infant, stable overnight. Mother with complications of incisional bleeding and also pulmonary embolism discovered on MRI. Mother plans to breastfeed but has been supplementing with formula infant taking 10-50 mL Q 3 hrs. Mother expresses concern about infant gas and abd discomfort, recommended decreased volume as infant taking high volume for 3days of age, as well as frequent burping. He has voided x 4 and stooled x 3 in past 24 hrs. Exam grossly normal, remarkable for bilateral congenital hydroceles, mild jaundice face, no murmur. Weight today 3012g, down 1.2%. Plan to continue routine  care. Mother updated with assistance of video . Jose G Ramirez [facility] 2020 @ 0830    Impression at Discharge: \"Ethan\" Ruth Jimenez is a 3 DO term AGA infant. He is alert and active on exam.  Pink and well perfused. Infant has breast fed x1. Infant otherwise formula feeding well taking 30-60 mls. Weight 3.045 kg, down 0.1 % from BW. Infant has voided x6 and stooled x6 over the past 24 hours. Bilirubin was obtained at 40 hours and at 4.2 was low risk. Exam wnl. Mother updated. Opportunity for questions given. Plan: DC to home with pediatrician follow up on . Arpit Matt [facility]  2020  0515      Addendum:  Mother not being discharge today due to medical concerns.  Will continue to hold infant until mother discharged. Gianluca Cisneros MD 2020 0945    Impression at Discharge: Term AGA male \"Ethan\" infant well-appearing and active, vital signs stable, assessment as above, weight 2991 grams, down 1.933% from birth weight, breast fed x 2, po ad yeni Similac ProAdvance 40 Ml - 60 mL per feeding, urine x 4, stool x 3 over past 24 hours. Bilirubin this morning 4.2 @ 40 hours of age, low risk. Plan to discharge home with mom and pediatrician follow up scheduled with [facility] due to unavailability of appointments  & . MAUREEN Ann 20 @ 3750  Addendum: Mother updated via telephone with . Time allowed for questions and answers. No current concerns. MAUREEN Ann 20 @ 0720    Addendum: Mother not being discharged today due to medical concerns. Will continue to hold infant until mother discharged. Gianluca Cisneros MD 2020 0900    Impression at Discharge:Pink, active and alert. Weight down  0.53% to 3.066kgs. Breast fed x 3 and taking formula per maternal preference 35-60 mls. Void x 9. Stool x 5. PE wnl: no audible murmur, pulses and perfusion wnl. CCHD screen 100/100. Hearing screen passed. [place] screen completed. Hep B vaccine received . Bili level 5.1-low risk at 40 hours. Follow up ped:pending. Mom with left pulmonary PE and on Warfarin and Lovenox. P: Discharge home if mom to be discharged today  [facility] 2020 0800  Addendum: Mom is not being discharged today so infant will remain. [facility] 20 1330    Impression at Discharge: Bob Lacy is a well appearing, male infant, currently 40w1d PMA and 10days old. Weight 3.07 kg (up 1% from BW). Total serum bilirubin 4.2 mg/dL on  and infant without jaundice. Vitals stable / wnl. Void x 7, stool x  3 over past 24 hours. Mother's preferred Feeding Method Used: Bottle, Breast feeding. Physical exam as above.   Plan: Discharge home with parents provided mother is discharged today. Follow [facility] scheduled with [facility] on 2020 at 0800. Mother updated via  and agrees with plan. Opportunity for questions provided. Star Hall PA-C   2020 at 7:05 AM      Discharge weight:    Wt Readings from Last 1 Encounters:   12/06/20 3.07 kg (15 %, Z= -1.02)*     * Growth percentiles are based on WHO (Boys, 0-2 years) data. none

## 2020-07-31 ENCOUNTER — APPOINTMENT (OUTPATIENT)
Dept: ORTHOPEDIC SURGERY | Facility: CLINIC | Age: 70
End: 2020-07-31
Payer: MEDICARE

## 2020-07-31 VITALS
DIASTOLIC BLOOD PRESSURE: 82 MMHG | WEIGHT: 155 LBS | BODY MASS INDEX: 23.49 KG/M2 | HEART RATE: 71 BPM | HEIGHT: 68 IN | SYSTOLIC BLOOD PRESSURE: 140 MMHG

## 2020-07-31 DIAGNOSIS — M17.0 BILATERAL PRIMARY OSTEOARTHRITIS OF KNEE: ICD-10-CM

## 2020-07-31 PROCEDURE — 20610 DRAIN/INJ JOINT/BURSA W/O US: CPT | Mod: RT

## 2020-07-31 PROCEDURE — 99213 OFFICE O/P EST LOW 20 MIN: CPT | Mod: 25

## 2020-07-31 NOTE — DISCUSSION/SUMMARY
[de-identified] : Plan for the patient is to continue with the present level of activities. She was explained again that she does have osteoarthritic changes she was explained the different modalities of treatment. Today in the office she was given a cortisone as well as he fell one viscus supplementation injection to her knee. She was splinted there is no guarantee to provide relief. In that this may be short lived. And that this is not a cure for her osteoarthritis. Patient is advised to continue with a good exercise program along with ice packs, leg elevation, a good exercise program and NSAIDs as needed.

## 2020-07-31 NOTE — PHYSICAL EXAM
[de-identified] : On physical examination of the right knee. Skin is clean dry and intact no erythema or swelling. There is some pain and tenderness noted diffusely but mostly in the patellofemoral as well as medial femoral tibial compartment. There is a slight valgus deformity noted. Overall the patient does have good range of motion. No ligamentous laxity. No evidence of any muscle atrophy. No evidence of any motor or sensory deficit. Patient is a good distal pulses with no calf tenderness. [de-identified] : No x-rays were performed today

## 2020-07-31 NOTE — PROCEDURE
[Injection] : Injection [Knee Joint] : knee joint [Right] : of the right [Osteoarthritis] : Osteoarthritis [Patient] : patient [Risk] : risk [Benefits] : benefits [Alternatives] : alternatives [Bleeding] : bleeding [Infection] : infection [Allergic Reaction] : allergic reaction [Alcohol] : Alcohol [Verbal Consent Obtained] : verbal consent was obtained prior to the procedure [Ethyl Chloride Spray] : ethyl chloride spray was used as a topical anesthetic [Medial] : medial [Anterior] : anterior [1% Lidocaine___(mL)] : [unfilled] mL of 1% Lidocaine [22] : a 22-gauge [Bandage Applied] : a bandage [Methylpred. 40mg/mL___(mL)] : [unfilled] mL of 40mg/mL methylprednisolone [None] : none [Tolerated Well] : The patient tolerated the procedure well [FreeTextEntry8] : gel one

## 2020-07-31 NOTE — HISTORY OF PRESENT ILLNESS
[de-identified] : Patient is a 6-year-old female who presents today for an evaluation regarding her right knee. She has been seen in the past regarding her right knee was explained she does have osteoarthritic changes. She has been treated conservatively including cortisone and Visco supplementation injections. She also states that she performs a good exercise program at home. She states normally she does very well not much discomfort. However yesterday she accidentally twisted her right knee. She states that she felt immediate pain and decided to seek additional medical attention. She states the pain is localized to the medial aspect of the knee. There is mostly with standing or walking.

## 2020-10-09 ENCOUNTER — APPOINTMENT (OUTPATIENT)
Dept: ORTHOPEDIC SURGERY | Facility: CLINIC | Age: 70
End: 2020-10-09
Payer: MEDICARE

## 2020-10-09 VITALS
BODY MASS INDEX: 23.49 KG/M2 | SYSTOLIC BLOOD PRESSURE: 138 MMHG | HEIGHT: 68 IN | DIASTOLIC BLOOD PRESSURE: 85 MMHG | WEIGHT: 155 LBS | HEART RATE: 75 BPM

## 2020-10-09 DIAGNOSIS — M17.11 UNILATERAL PRIMARY OSTEOARTHRITIS, RIGHT KNEE: ICD-10-CM

## 2020-10-09 DIAGNOSIS — Z01.818 ENCOUNTER FOR OTHER PREPROCEDURAL EXAMINATION: ICD-10-CM

## 2020-10-09 PROCEDURE — 73562 X-RAY EXAM OF KNEE 3: CPT | Mod: RT

## 2020-10-09 PROCEDURE — 99214 OFFICE O/P EST MOD 30 MIN: CPT

## 2020-10-19 LAB
ALBUMIN SERPL ELPH-MCNC: 4.8 G/DL
ALP BLD-CCNC: 50 U/L
ALT SERPL-CCNC: 26 U/L
ANION GAP SERPL CALC-SCNC: 15 MMOL/L
APTT BLD: 26.9 SEC
AST SERPL-CCNC: 30 U/L
BASOPHILS # BLD AUTO: 0.01 K/UL
BASOPHILS NFR BLD AUTO: 0.2 %
BILIRUB SERPL-MCNC: 0.3 MG/DL
BUN SERPL-MCNC: 19 MG/DL
CALCIUM SERPL-MCNC: 10.3 MG/DL
CHLORIDE SERPL-SCNC: 101 MMOL/L
CO2 SERPL-SCNC: 26 MMOL/L
CREAT SERPL-MCNC: 0.78 MG/DL
EOSINOPHIL # BLD AUTO: 0.09 K/UL
EOSINOPHIL NFR BLD AUTO: 1.8 %
GLUCOSE SERPL-MCNC: 99 MG/DL
HCT VFR BLD CALC: 44.9 %
HGB BLD-MCNC: 15.2 G/DL
IMM GRANULOCYTES NFR BLD AUTO: 0.2 %
INR PPP: 0.93 RATIO
LYMPHOCYTES # BLD AUTO: 1.09 K/UL
LYMPHOCYTES NFR BLD AUTO: 21.8 %
MAN DIFF?: NORMAL
MCHC RBC-ENTMCNC: 33.3 PG
MCHC RBC-ENTMCNC: 33.9 GM/DL
MCV RBC AUTO: 98.2 FL
MONOCYTES # BLD AUTO: 0.54 K/UL
MONOCYTES NFR BLD AUTO: 10.8 %
NEUTROPHILS # BLD AUTO: 3.26 K/UL
NEUTROPHILS NFR BLD AUTO: 65.2 %
PLATELET # BLD AUTO: 228 K/UL
POTASSIUM SERPL-SCNC: 5.3 MMOL/L
PROT SERPL-MCNC: 7.5 G/DL
PT BLD: 11 SEC
RBC # BLD: 4.57 M/UL
RBC # FLD: 11.6 %
SODIUM SERPL-SCNC: 142 MMOL/L
WBC # FLD AUTO: 5 K/UL

## 2020-10-30 ENCOUNTER — OUTPATIENT (OUTPATIENT)
Dept: OUTPATIENT SERVICES | Facility: HOSPITAL | Age: 70
LOS: 1 days | End: 2020-10-30
Payer: MEDICARE

## 2020-10-30 VITALS
TEMPERATURE: 98 F | WEIGHT: 160.94 LBS | DIASTOLIC BLOOD PRESSURE: 82 MMHG | RESPIRATION RATE: 21 BRPM | OXYGEN SATURATION: 100 % | HEIGHT: 68 IN | HEART RATE: 81 BPM | SYSTOLIC BLOOD PRESSURE: 116 MMHG

## 2020-10-30 DIAGNOSIS — M25.561 PAIN IN RIGHT KNEE: ICD-10-CM

## 2020-10-30 DIAGNOSIS — Z96.641 PRESENCE OF RIGHT ARTIFICIAL HIP JOINT: Chronic | ICD-10-CM

## 2020-10-30 DIAGNOSIS — G47.30 SLEEP APNEA, UNSPECIFIED: ICD-10-CM

## 2020-10-30 DIAGNOSIS — Z98.89 OTHER SPECIFIED POSTPROCEDURAL STATES: Chronic | ICD-10-CM

## 2020-10-30 DIAGNOSIS — Z98.890 OTHER SPECIFIED POSTPROCEDURAL STATES: Chronic | ICD-10-CM

## 2020-10-30 DIAGNOSIS — N84.0 POLYP OF CORPUS UTERI: Chronic | ICD-10-CM

## 2020-10-30 DIAGNOSIS — M17.11 UNILATERAL PRIMARY OSTEOARTHRITIS, RIGHT KNEE: ICD-10-CM

## 2020-10-30 DIAGNOSIS — Z01.818 ENCOUNTER FOR OTHER PREPROCEDURAL EXAMINATION: ICD-10-CM

## 2020-10-30 RX ORDER — MUPIROCIN 20 MG/G
1 OINTMENT TOPICAL
Qty: 1 | Refills: 0
Start: 2020-10-30 | End: 2020-11-03

## 2020-10-30 NOTE — H&P PST ADULT - NSICDXFAMILYHX_GEN_ALL_CORE_FT
FAMILY HISTORY:  Family history of factor V Leiden mutation, osteoarthritis-father  Family history of MI (myocardial infarction), mother age 62,67;, bladder cancer at age 90  Family history of osteoarthritis, sister  Family hx of prostate cancer, father-

## 2020-10-30 NOTE — H&P PST ADULT - ADDITIONAL PE
physical exam only at bedside today; history taken at another date by another provider  Shey, HELENA-C

## 2020-10-30 NOTE — H&P PST ADULT - NSICDXPASTSURGICALHX_GEN_ALL_CORE_FT
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

## 2020-10-30 NOTE — H&P PST ADULT - VISION (WITH CORRECTIVE LENSES IF THE PATIENT USUALLY WEARS THEM):
+ glasses reading/Normal vision: sees adequately in most situations; can see medication labels, newsprint

## 2020-10-30 NOTE — H&P PST ADULT - HISTORY OF PRESENT ILLNESS
65 y.o . female presents here w/ long standing hx. of right knee pain  w/ decreased R.O.M., ambulation, mobility and ADL function. Seen by   and referred for surgery.  has difficulty climbing stairs, has had steroid and gel injections which helped temporarily; taking tylenol for pain

## 2020-10-30 NOTE — H&P PST ADULT - NSICDXPROBLEM_GEN_ALL_CORE_FT
PROBLEM DIAGNOSES  Problem: Sleep apnea  Assessment and Plan: KYLE precautions    Problem: Right knee pain  Assessment and Plan: right total knee replacement;  she has covid appt at [facility] in [place]; mupirocin info given-rx to be sent to pharm; preop instructions given; went for medical clearance, had bloodwork and ekg

## 2020-10-30 NOTE — H&P PST ADULT - NSICDXPASTMEDICALHX_GEN_ALL_CORE_FT
PAST MEDICAL HISTORY:  Factor V Leiden had workups and there is no problem, no history of any problems    Gastroesophageal reflux disease with esophagitis     History of osteoarthritis     Hypercholesteremia past    Insomnia fine now    Jejunitis

## 2020-10-30 NOTE — H&P PST ADULT - GENERAL
negative
Protonix for GI ppx  HSQ and SCDs for dvt ppx    Plan for discharge today 9/18.  Case discussed with Dr. Mims at am rounds.

## 2020-11-02 DIAGNOSIS — Z11.59 ENCOUNTER FOR SCREENING FOR OTHER VIRAL DISEASES: ICD-10-CM

## 2020-11-02 PROCEDURE — G0463: CPT

## 2020-11-04 LAB — SARS-COV-2 N GENE NPH QL NAA+PROBE: NOT DETECTED

## 2020-11-05 ENCOUNTER — APPOINTMENT (OUTPATIENT)
Dept: ORTHOPEDIC SURGERY | Facility: HOSPITAL | Age: 70
End: 2020-11-05

## 2020-11-05 ENCOUNTER — RESULT REVIEW (OUTPATIENT)
Age: 70
End: 2020-11-05

## 2020-11-05 ENCOUNTER — TRANSCRIPTION ENCOUNTER (OUTPATIENT)
Age: 70
End: 2020-11-05

## 2020-11-05 ENCOUNTER — INPATIENT (INPATIENT)
Facility: HOSPITAL | Age: 70
LOS: 1 days | Discharge: HOME | DRG: 470 | End: 2020-11-07
Attending: ORTHOPAEDIC SURGERY | Admitting: ORTHOPAEDIC SURGERY
Payer: MEDICARE

## 2020-11-05 VITALS
OXYGEN SATURATION: 100 % | RESPIRATION RATE: 21 BRPM | HEIGHT: 67 IN | HEART RATE: 81 BPM | WEIGHT: 162.7 LBS | DIASTOLIC BLOOD PRESSURE: 82 MMHG | SYSTOLIC BLOOD PRESSURE: 116 MMHG | TEMPERATURE: 98 F

## 2020-11-05 DIAGNOSIS — N84.0 POLYP OF CORPUS UTERI: Chronic | ICD-10-CM

## 2020-11-05 DIAGNOSIS — M17.11 UNILATERAL PRIMARY OSTEOARTHRITIS, RIGHT KNEE: ICD-10-CM

## 2020-11-05 DIAGNOSIS — Z98.89 OTHER SPECIFIED POSTPROCEDURAL STATES: Chronic | ICD-10-CM

## 2020-11-05 DIAGNOSIS — Z98.890 OTHER SPECIFIED POSTPROCEDURAL STATES: Chronic | ICD-10-CM

## 2020-11-05 DIAGNOSIS — Z96.641 PRESENCE OF RIGHT ARTIFICIAL HIP JOINT: Chronic | ICD-10-CM

## 2020-11-05 LAB
ANION GAP SERPL CALC-SCNC: 8 MMOL/L — SIGNIFICANT CHANGE UP (ref 5–17)
BUN SERPL-MCNC: 21 MG/DL — SIGNIFICANT CHANGE UP (ref 7–23)
CALCIUM SERPL-MCNC: 8.7 MG/DL — SIGNIFICANT CHANGE UP (ref 8.4–10.5)
CHLORIDE SERPL-SCNC: 106 MMOL/L — SIGNIFICANT CHANGE UP (ref 96–108)
CO2 SERPL-SCNC: 26 MMOL/L — SIGNIFICANT CHANGE UP (ref 22–31)
CREAT SERPL-MCNC: 0.94 MG/DL — SIGNIFICANT CHANGE UP (ref 0.5–1.3)
GLUCOSE SERPL-MCNC: 206 MG/DL — HIGH (ref 70–99)
HCT VFR BLD CALC: 37.7 % — SIGNIFICANT CHANGE UP (ref 34.5–45)
HGB BLD-MCNC: 12.5 G/DL — SIGNIFICANT CHANGE UP (ref 11.5–15.5)
MCHC RBC-ENTMCNC: 32.8 PG — SIGNIFICANT CHANGE UP (ref 27–34)
MCHC RBC-ENTMCNC: 33.2 GM/DL — SIGNIFICANT CHANGE UP (ref 32–36)
MCV RBC AUTO: 99 FL — SIGNIFICANT CHANGE UP (ref 80–100)
NRBC # BLD: 0 /100 WBCS — SIGNIFICANT CHANGE UP (ref 0–0)
PLATELET # BLD AUTO: 151 K/UL — SIGNIFICANT CHANGE UP (ref 150–400)
POTASSIUM SERPL-MCNC: 4.5 MMOL/L — SIGNIFICANT CHANGE UP (ref 3.5–5.3)
POTASSIUM SERPL-SCNC: 4.5 MMOL/L — SIGNIFICANT CHANGE UP (ref 3.5–5.3)
RBC # BLD: 3.81 M/UL — SIGNIFICANT CHANGE UP (ref 3.8–5.2)
RBC # FLD: 11.8 % — SIGNIFICANT CHANGE UP (ref 10.3–14.5)
SODIUM SERPL-SCNC: 140 MMOL/L — SIGNIFICANT CHANGE UP (ref 135–145)
WBC # BLD: 8.28 K/UL — SIGNIFICANT CHANGE UP (ref 3.8–10.5)
WBC # FLD AUTO: 8.28 K/UL — SIGNIFICANT CHANGE UP (ref 3.8–10.5)

## 2020-11-05 PROCEDURE — 99223 1ST HOSP IP/OBS HIGH 75: CPT

## 2020-11-05 PROCEDURE — 73562 X-RAY EXAM OF KNEE 3: CPT | Mod: 26,RT

## 2020-11-05 PROCEDURE — 27447 TOTAL KNEE ARTHROPLASTY: CPT | Mod: RT

## 2020-11-05 PROCEDURE — 88305 TISSUE EXAM BY PATHOLOGIST: CPT | Mod: 26

## 2020-11-05 PROCEDURE — 12345: CPT | Mod: NC

## 2020-11-05 PROCEDURE — 88311 DECALCIFY TISSUE: CPT | Mod: 26

## 2020-11-05 RX ORDER — PANTOPRAZOLE SODIUM 20 MG/1
40 TABLET, DELAYED RELEASE ORAL
Refills: 0 | Status: DISCONTINUED | OUTPATIENT
Start: 2020-11-05 | End: 2020-11-05

## 2020-11-05 RX ORDER — SODIUM CHLORIDE 9 MG/ML
1000 INJECTION, SOLUTION INTRAVENOUS
Refills: 0 | Status: DISCONTINUED | OUTPATIENT
Start: 2020-11-05 | End: 2020-11-05

## 2020-11-05 RX ORDER — HYDROMORPHONE HYDROCHLORIDE 2 MG/ML
4 INJECTION INTRAMUSCULAR; INTRAVENOUS; SUBCUTANEOUS
Refills: 0 | Status: DISCONTINUED | OUTPATIENT
Start: 2020-11-05 | End: 2020-11-07

## 2020-11-05 RX ORDER — SENNA PLUS 8.6 MG/1
2 TABLET ORAL AT BEDTIME
Refills: 0 | Status: DISCONTINUED | OUTPATIENT
Start: 2020-11-05 | End: 2020-11-07

## 2020-11-05 RX ORDER — SODIUM CHLORIDE 9 MG/ML
1000 INJECTION, SOLUTION INTRAVENOUS
Refills: 0 | Status: DISCONTINUED | OUTPATIENT
Start: 2020-11-05 | End: 2020-11-07

## 2020-11-05 RX ORDER — ACETAMINOPHEN 500 MG
1000 TABLET ORAL EVERY 6 HOURS
Refills: 0 | Status: COMPLETED | OUTPATIENT
Start: 2020-11-05 | End: 2020-11-06

## 2020-11-05 RX ORDER — FAMOTIDINE 10 MG/ML
20 INJECTION INTRAVENOUS
Refills: 0 | Status: DISCONTINUED | OUTPATIENT
Start: 2020-11-05 | End: 2020-11-07

## 2020-11-05 RX ORDER — CELECOXIB 200 MG/1
1 CAPSULE ORAL
Qty: 28 | Refills: 0
Start: 2020-11-05 | End: 2020-11-18

## 2020-11-05 RX ORDER — HYDROMORPHONE HYDROCHLORIDE 2 MG/ML
0.5 INJECTION INTRAMUSCULAR; INTRAVENOUS; SUBCUTANEOUS
Refills: 0 | Status: DISCONTINUED | OUTPATIENT
Start: 2020-11-05 | End: 2020-11-07

## 2020-11-05 RX ORDER — CEFAZOLIN SODIUM 1 G
2000 VIAL (EA) INJECTION ONCE
Refills: 0 | Status: COMPLETED | OUTPATIENT
Start: 2020-11-05 | End: 2020-11-05

## 2020-11-05 RX ORDER — ONDANSETRON 8 MG/1
4 TABLET, FILM COATED ORAL ONCE
Refills: 0 | Status: DISCONTINUED | OUTPATIENT
Start: 2020-11-05 | End: 2020-11-05

## 2020-11-05 RX ORDER — HYDROMORPHONE HYDROCHLORIDE 2 MG/ML
0.5 INJECTION INTRAMUSCULAR; INTRAVENOUS; SUBCUTANEOUS
Refills: 0 | Status: DISCONTINUED | OUTPATIENT
Start: 2020-11-05 | End: 2020-11-05

## 2020-11-05 RX ORDER — SODIUM CHLORIDE 9 MG/ML
500 INJECTION INTRAMUSCULAR; INTRAVENOUS; SUBCUTANEOUS ONCE
Refills: 0 | Status: COMPLETED | OUTPATIENT
Start: 2020-11-05 | End: 2020-11-05

## 2020-11-05 RX ORDER — HYDROMORPHONE HYDROCHLORIDE 2 MG/ML
2 INJECTION INTRAMUSCULAR; INTRAVENOUS; SUBCUTANEOUS
Refills: 0 | Status: DISCONTINUED | OUTPATIENT
Start: 2020-11-05 | End: 2020-11-07

## 2020-11-05 RX ORDER — ACETAMINOPHEN 500 MG
1000 TABLET ORAL ONCE
Refills: 0 | Status: COMPLETED | OUTPATIENT
Start: 2020-11-05 | End: 2020-11-05

## 2020-11-05 RX ORDER — TRANEXAMIC ACID 100 MG/ML
1000 INJECTION, SOLUTION INTRAVENOUS ONCE
Refills: 0 | Status: COMPLETED | OUTPATIENT
Start: 2020-11-05 | End: 2020-11-05

## 2020-11-05 RX ORDER — CHLORHEXIDINE GLUCONATE 213 G/1000ML
1 SOLUTION TOPICAL DAILY
Refills: 0 | Status: DISCONTINUED | OUTPATIENT
Start: 2020-11-05 | End: 2020-11-05

## 2020-11-05 RX ORDER — APIXABAN 2.5 MG/1
1 TABLET, FILM COATED ORAL
Qty: 24 | Refills: 0
Start: 2020-11-05 | End: 2020-11-16

## 2020-11-05 RX ORDER — APIXABAN 2.5 MG/1
2.5 TABLET, FILM COATED ORAL EVERY 12 HOURS
Refills: 0 | Status: COMPLETED | OUTPATIENT
Start: 2020-11-06 | End: 2020-11-07

## 2020-11-05 RX ORDER — CEFAZOLIN SODIUM 1 G
2000 VIAL (EA) INJECTION EVERY 8 HOURS
Refills: 0 | Status: COMPLETED | OUTPATIENT
Start: 2020-11-05 | End: 2020-11-06

## 2020-11-05 RX ORDER — POLYETHYLENE GLYCOL 3350 17 G/17G
17 POWDER, FOR SOLUTION ORAL DAILY
Refills: 0 | Status: DISCONTINUED | OUTPATIENT
Start: 2020-11-05 | End: 2020-11-07

## 2020-11-05 RX ORDER — ONDANSETRON 8 MG/1
4 TABLET, FILM COATED ORAL EVERY 6 HOURS
Refills: 0 | Status: DISCONTINUED | OUTPATIENT
Start: 2020-11-05 | End: 2020-11-07

## 2020-11-05 RX ORDER — CELECOXIB 200 MG/1
200 CAPSULE ORAL EVERY 12 HOURS
Refills: 0 | Status: DISCONTINUED | OUTPATIENT
Start: 2020-11-06 | End: 2020-11-07

## 2020-11-05 RX ORDER — APREPITANT 80 MG/1
40 CAPSULE ORAL ONCE
Refills: 0 | Status: COMPLETED | OUTPATIENT
Start: 2020-11-05 | End: 2020-11-05

## 2020-11-05 RX ORDER — APIXABAN 2.5 MG/1
2.5 TABLET, FILM COATED ORAL EVERY 12 HOURS
Refills: 0 | Status: DISCONTINUED | OUTPATIENT
Start: 2020-11-07 | End: 2020-11-07

## 2020-11-05 RX ORDER — OMEPRAZOLE 10 MG/1
1 CAPSULE, DELAYED RELEASE ORAL
Qty: 30 | Refills: 1
Start: 2020-11-05 | End: 2021-01-03

## 2020-11-05 RX ORDER — ATORVASTATIN CALCIUM 80 MG/1
10 TABLET, FILM COATED ORAL AT BEDTIME
Refills: 0 | Status: DISCONTINUED | OUTPATIENT
Start: 2020-11-05 | End: 2020-11-07

## 2020-11-05 RX ORDER — MAGNESIUM HYDROXIDE 400 MG/1
30 TABLET, CHEWABLE ORAL DAILY
Refills: 0 | Status: DISCONTINUED | OUTPATIENT
Start: 2020-11-05 | End: 2020-11-07

## 2020-11-05 RX ORDER — ACETAMINOPHEN 500 MG
1000 TABLET ORAL EVERY 8 HOURS
Refills: 0 | Status: DISCONTINUED | OUTPATIENT
Start: 2020-11-06 | End: 2020-11-07

## 2020-11-05 RX ADMIN — Medication 400 MILLIGRAM(S): at 19:36

## 2020-11-05 RX ADMIN — HYDROMORPHONE HYDROCHLORIDE 0.5 MILLIGRAM(S): 2 INJECTION INTRAMUSCULAR; INTRAVENOUS; SUBCUTANEOUS at 18:06

## 2020-11-05 RX ADMIN — HYDROMORPHONE HYDROCHLORIDE 0.5 MILLIGRAM(S): 2 INJECTION INTRAMUSCULAR; INTRAVENOUS; SUBCUTANEOUS at 17:45

## 2020-11-05 RX ADMIN — APREPITANT 40 MILLIGRAM(S): 80 CAPSULE ORAL at 12:16

## 2020-11-05 RX ADMIN — HYDROMORPHONE HYDROCHLORIDE 2 MILLIGRAM(S): 2 INJECTION INTRAMUSCULAR; INTRAVENOUS; SUBCUTANEOUS at 23:42

## 2020-11-05 RX ADMIN — Medication 100 MILLIGRAM(S): at 21:11

## 2020-11-05 RX ADMIN — SODIUM CHLORIDE 500 MILLILITER(S): 9 INJECTION INTRAMUSCULAR; INTRAVENOUS; SUBCUTANEOUS at 16:09

## 2020-11-05 RX ADMIN — ATORVASTATIN CALCIUM 10 MILLIGRAM(S): 80 TABLET, FILM COATED ORAL at 21:10

## 2020-11-05 RX ADMIN — FAMOTIDINE 20 MILLIGRAM(S): 10 INJECTION INTRAVENOUS at 22:54

## 2020-11-05 RX ADMIN — SODIUM CHLORIDE 500 MILLILITER(S): 9 INJECTION INTRAMUSCULAR; INTRAVENOUS; SUBCUTANEOUS at 19:36

## 2020-11-05 RX ADMIN — CHLORHEXIDINE GLUCONATE 1 APPLICATION(S): 213 SOLUTION TOPICAL at 12:17

## 2020-11-05 RX ADMIN — HYDROMORPHONE HYDROCHLORIDE 2 MILLIGRAM(S): 2 INJECTION INTRAMUSCULAR; INTRAVENOUS; SUBCUTANEOUS at 20:53

## 2020-11-05 RX ADMIN — HYDROMORPHONE HYDROCHLORIDE 2 MILLIGRAM(S): 2 INJECTION INTRAMUSCULAR; INTRAVENOUS; SUBCUTANEOUS at 20:23

## 2020-11-05 NOTE — PROGRESS NOTE ADULT - SUBJECTIVE AND OBJECTIVE BOX
POST OPERATIVE DAY #: 0    69y Female  s/p   Right  TKA                    SUBJECTIVE: Patient seen and examined at bedside. c/o new numbness over lateral toes, bottom of the foot    Pain:  well controlled     Pain scale:  3 /10  Denies CP, SOB, N/V/D, weakness, numbness   No new complains     OBJECTIVE:     Vital Signs Last 24 Hrs  T(C): 36.4 (05 Nov 2020 18:55), Max: 36.9 (05 Nov 2020 15:15)  T(F): 97.6 (05 Nov 2020 18:55), Max: 98.4 (05 Nov 2020 15:15)  HR: 85 (05 Nov 2020 18:55) (73 - 95)  BP: 98/58 (05 Nov 2020 18:55) (93/44 - 121/80)  BP(mean): --  RR: 16 (05 Nov 2020 18:55) (12 - 21)  SpO2: 98% (05 Nov 2020 18:55) (95% - 100%)    Affected extremity: RLE         Dressing: clean/dry/intact     woody                     Sensation: intact to light touch  decreased sensation over S1         Motor exam:  5 / 5 Tibialis Anterior/Gastrocnemius-Soleus, EHL/FHL         warm, well-perfused; capillary refill < 3 seconds         negative calf tenderness B/L LE       LABS:                        12.5   8.28  )-----------( 151      ( 05 Nov 2020 18:19 )             37.7     11-05    140  |  106  |  21  ----------------------------<  206<H>  4.5   |  26  |  0.94    Ca    8.7      05 Nov 2020 18:19        I&O's Detail    05 Nov 2020 07:01  -  05 Nov 2020 19:40  --------------------------------------------------------  IN:    Modular Fluid: 1400 mL    Sodium Chloride 0.9% Bolus: 500 mL  Total IN: 1900 mL    OUT:    Estimated Blood Loss (mL): 150 mL  Total OUT: 150 mL    Total NET: 1750 mL          MEDICATIONS:  Infection prophylaxis:  ceFAZolin   IVPB 2000 milliGRAM(s) IV Intermittent every 8 hours    Pain management:  acetaminophen  IVPB .. 1000 milliGRAM(s) IV Intermittent every 6 hours  HYDROmorphone   Tablet 2 milliGRAM(s) Oral every 3 hours PRN  HYDROmorphone   Tablet 4 milliGRAM(s) Oral every 3 hours PRN  HYDROmorphone  Injectable 0.5 milliGRAM(s) IV Push every 3 hours PRN  ondansetron Injectable 4 milliGRAM(s) IV Push every 6 hours PRN    DVT prophylaxis:       RADIOLOGY & ADDITIONAL STUDIES:    ASSESSMENT AND PLAN:   - woody  - Analgesic pain control  - DVT prophylaxis:   Eliquis2.5mg twice a day  SCDs       - Antibiotics:  Ancef   for 24 hours   - Pain Management: Celebrex 200mg twice a day x 21 days   - PT/OT: Weight Bearing Status:  Weight bearing as tolerated, OOBTC        -  Incentive spirometry  - IVF  - Advance diet as tolerated  - Hospitalist is following  -  Follow up labs  -  Disposition: Home

## 2020-11-05 NOTE — CONSULT NOTE ADULT - ASSESSMENT
69F HLD, GERD and OA admitted for aftercare following Right TKR.     S/P Right TKR  POD 0  Continue Bowel and pain control regimen.   Incentive Spirometer for lung expansion.  Work with PT to increase ambulation as per orthopedics.  Monitor Hgb and follow up electrolytes.   Orthopedics on board and following     HLD  Statin    GERD  Protonix    Diet  Regular    DVT Prophylaxis  Asprin BID    Disposition  Full Code/Inpatient  Discharge planning pending hospital course

## 2020-11-05 NOTE — PHYSICAL THERAPY INITIAL EVALUATION ADULT - GAIT TRAINING, PT EVAL
Goals 1-2 days, Pt will ambulate 150 ft w/ rolling walker independently.     Pt will negotiate 10 steps with rail and straight cane with supervision

## 2020-11-05 NOTE — DISCHARGE NOTE PROVIDER - CARE PROVIDER_API CALL
Leyla Sarmiento)  Orthopedics  [address]  [place] [address]  Phone: [phone]  Fax: [phone]  Scheduled Appointment: 11/18/2020 10:00 AM

## 2020-11-05 NOTE — DISCHARGE NOTE PROVIDER - NSDCMRMEDTOKEN_GEN_ALL_CORE_FT
Lipitor 10 mg oral tablet: 1 tab(s) orally once a day (at bedtime)  mupirocin 2% topical ointment: insert ointment inside both nostrils  2 times a day for 5 days   Pepcid 20 mg oral tablet: 1 tab(s) orally 2 times a day, As Needed  Tylenol 500 mg oral tablet: 2 tab(s) orally every 6 hours, As Needed   acetaminophen 500 mg oral tablet: 2 tab(s) orally every 8 hours  apixaban 2.5 mg oral tablet: 1 tab(s) orally every 12 hours  bisacodyl 10 mg rectal suppository: 1 suppository(ies) rectal once a day, As needed, If no bowel movement by postoperative day #2  celecoxib 200 mg oral capsule: 1 cap(s) orally every 12 hours  HYDROmorphone 2 mg oral tablet: 1 - 2 tab(s) orally every 4 hours while awake, As Needed -Mild Pain (1 - 3) - for moderate pain MDD:6   Lipitor 10 mg oral tablet: 1 tab(s) orally once a day (at bedtime)  omeprazole 20 mg oral delayed release capsule: 1 cap(s) orally once a day   Pepcid 20 mg oral tablet: 1 tab(s) orally 2 times a day, As Needed  polyethylene glycol 3350 oral powder for reconstitution: 17 gram(s) orally once a day  senna oral tablet: 2 tab(s) orally once a day (at bedtime)   acetaminophen 500 mg oral tablet: 2 tab(s) orally every 8 hours  apixaban 2.5 mg oral tablet: 1 tab(s) orally every 12 hours  Aspirin Low Dose 81 mg oral delayed release tablet: 1 tab(s) orally 2 times a day   start once Eliquis is completed   bisacodyl 10 mg rectal suppository: 1 suppository(ies) rectal once a day, As needed, If no bowel movement by postoperative day #2  celecoxib 200 mg oral capsule: 1 cap(s) orally every 12 hours  HYDROmorphone 2 mg oral tablet: 1 - 2 tab(s) orally every 4 hours while awake, As Needed -Mild Pain (1 - 3) - for moderate pain MDD:6   Lipitor 10 mg oral tablet: 1 tab(s) orally once a day (at bedtime)  omeprazole 20 mg oral delayed release capsule: 1 cap(s) orally once a day   Pepcid 20 mg oral tablet: 1 tab(s) orally 2 times a day, As Needed  polyethylene glycol 3350 oral powder for reconstitution: 17 gram(s) orally once a day  senna oral tablet: 2 tab(s) orally once a day (at bedtime)   acetaminophen 500 mg oral tablet: 2 tab(s) orally every 8 hours  apixaban 2.5 mg oral tablet: 1 tab(s) orally every 12 hours  Aspirin Low Dose 81 mg oral delayed release tablet: 1 tab(s) orally 2 times a day   start once Eliquis is completed   bisacodyl 10 mg rectal suppository: 1 suppository(ies) rectal once a day, As needed, If no bowel movement by postoperative day #2  celecoxib 200 mg oral capsule: 1 cap(s) orally every 12 hours  HYDROmorphone 2 mg oral tablet: 1 - 2 tab(s) orally every 4 hours while awake, As Needed -Mild Pain (1 - 3) - for moderate pain MDD:6   Lipitor 10 mg oral tablet: 1 tab(s) orally once a day (at bedtime)  Pepcid 20 mg oral tablet: 1 tab(s) orally 2 times a day  polyethylene glycol 3350 oral powder for reconstitution: 17 gram(s) orally once a day  senna oral tablet: 2 tab(s) orally once a day (at bedtime)

## 2020-11-05 NOTE — DISCHARGE NOTE NURSING/CASE MANAGEMENT/SOCIAL WORK - NSSCNAMETXT_GEN_ALL_CORE
[facility] - [phone]  Nurse to visit the day after hospital discharge; physical therapist to follow. Please contact the home care agency at the above phone number if you have not heard from them by 12 noon on the day after your hospital discharge.

## 2020-11-05 NOTE — DISCHARGE NOTE PROVIDER - HOSPITAL COURSE
This patient was admitted to [facility] with a history of severe degenerative joint disease of the right knee.  Patient went to Pre-Surgical Testing at [facility] and was medically cleared to undergo elective procedure. Patient underwent Right TKR by Dr. Sarmiento on 11/5/2020.  No operative or susanna-operative complications arose during patients hospital course.  Patient received antibiotic according to SCIP guidelines for infection prevention.  Eliquis was given for DVT prophylaxis.  Anesthesia, Medical Hospitalist, Physical Therapy and Occupational Therapy were consulted. Patient is stable for discharge with a good prognosis.  Appropriate discharge instructions and medications are provided in this document.

## 2020-11-05 NOTE — DISCHARGE NOTE NURSING/CASE MANAGEMENT/SOCIAL WORK - NSDCDMETYPESERV_GEN_ALL_CORE_FT
you stated you have 2 rolling walkers and  2 commodes, 2 canes, long shoehorn, EZ- reacher prior to this admission

## 2020-11-05 NOTE — PHYSICAL THERAPY INITIAL EVALUATION ADULT - GAIT DEVIATIONS NOTED, PT EVAL
decreased marco antonio/decreased stride length/decreased step length/decreased weight-shifting ability/decreased velocity of limb motion

## 2020-11-05 NOTE — DISCHARGE NOTE NURSING/CASE MANAGEMENT/SOCIAL WORK - PATIENT PORTAL LINK FT
You can access the FollowMyHealth Patient Portal offered by [facility] by registering at the following website: [email or web address]. By joining [facility]’s FollowMyHealth portal, you will also be able to view your health information using other applications (apps) compatible with our system.

## 2020-11-05 NOTE — PHYSICAL THERAPY INITIAL EVALUATION ADULT - RANGE OF MOTION EXAMINATION, REHAB EVAL
Left LE ROM was WFL (within functional limits)/deficits as listed below/right knee flexion 70 deg in sitting

## 2020-11-05 NOTE — DISCHARGE NOTE PROVIDER - NSDCCPCAREPLAN_GEN_ALL_CORE_FT
PRINCIPAL DISCHARGE DIAGNOSIS  Diagnosis: Osteoarthritis of right knee  Assessment and Plan of Treatment: Physical Therapy/Occupational Therapy for ambulation, transfers, stairs, ADL's, Range of Motion Exercises, Isometrics.  Full weight bearing as tolerated with rolling walker  Range of Motion Goals: Flexion 120 degrees; Extension 0 degrees  Keep incision clean and dry.  Suture/prineo dressing removal 14 days after surgery at rehab facility or Surgeon's office  May shower post-op day #5 if no drainage from incision

## 2020-11-05 NOTE — PHYSICAL THERAPY INITIAL EVALUATION ADULT - ADDITIONAL COMMENTS
Pt lives in a house w/ 2 steps + 4 steps to enter with rail, 1 step through the door, 1 flight of steps to bedroom with rail.  Pt has 2 walkers and commode.   Pt reports that she will ask her boyfriend to stay with her upon discharge

## 2020-11-05 NOTE — DISCHARGE NOTE PROVIDER - INSTRUCTIONS
For Constipation :   • Increase your water intake. Drink at least 8 glasses of water daily.  • Try adding fiber to your diet by eating fruits, vegetables and foods that are rich in grains.  • If you do experience constipation, you may take an over-the-counter stool softener/laxative such as Rizwana Colace, Senekot or  Milk of Magnesia.

## 2020-11-05 NOTE — CONSULT NOTE ADULT - SUBJECTIVE AND OBJECTIVE BOX
HPI: 69F HLD, GERD and OA has been combatting pain in right knee for several years which has progressively worsened.  Patient has tried multiple options for pain relief including OTC medication and as well as PT with minimal relief and has undergone elective replacement of right knee successfully.  She is currently resting in bed comfortable with good pain control.     REVIEW OF SYSTEMS:  CONSTITUTIONAL: No fever, weight loss, or fatigue  EYES: No eye pain, visual disturbances, or discharge  ENMT:  No difficulty hearing, tinnitus, vertigo; No sinus or throat pain  NECK: No pain or stiffness  RESPIRATORY: No cough, wheezing, chills or hemoptysis; No shortness of breath  CARDIOVASCULAR: No chest pain, palpitations, dizziness, or leg swelling  GASTROINTESTINAL: No abdominal or epigastric pain. No nausea, vomiting, or hematemesis; No diarrhea or constipation. No melena or hematochezia.  GENITOURINARY: No dysuria, frequency, hematuria, or incontinence  NEUROLOGICAL: No headaches, memory loss, loss of strength, numbness, or tremors  MUSCULOSKELETAL: No muscle or back pain      PAST MEDICAL & SURGICAL HISTORY:  Jejunitis    Insomnia  fine now    Gastroesophageal reflux disease with esophagitis    History of osteoarthritis    Hypercholesteremia  past    Factor V Leiden  had workups and there is no problem, no history of any problems    H/O cosmetic surgery  face lift 12 hour    S/P LASIK surgery of both eyes    Intrauterine polyp    S/P D&amp;C (status post dilation and curettage)    S/P hip replacement, right  2003, 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        SOCIAL HISTORY:  Negative for Tobacco; 1-2 Drinks per week of EtOH; Negative for Illicit Drugs    Allergies    codeine (Hives; Anaphylaxis)  fentanyl (Pruritus; Urticaria)  iodine (Anaphylaxis)    Intolerances        MEDICATIONS  (STANDING):    MEDICATIONS  (PRN):      FAMILY HISTORY:  Family history of osteoarthritis  sister    Family hx of prostate cancer  father-    Family history of MI (myocardial infarction)  mother age 62,67;, bladder cancer at age 90    Family history of factor V Leiden mutation  osteoarthritis-father        Vital Signs Last 24 Hrs  T(C): 36.9 (05 Nov 2020 15:15), Max: 36.9 (05 Nov 2020 15:15)  T(F): 98.4 (05 Nov 2020 15:15), Max: 98.4 (05 Nov 2020 15:15)  HR: 73 (05 Nov 2020 16:15) (73 - 92)  BP: 98/41 (05 Nov 2020 16:15) (93/44 - 121/80)  BP(mean): --  RR: 15 (05 Nov 2020 16:15) (12 - 21)  SpO2: 95% (05 Nov 2020 16:15) (95% - 100%)    PHYSICAL EXAM:    GENERAL: NAD, well-developed  HEAD:  Atraumatic, Normocephalic  EYES: EOMI, PERRLA, conjunctiva and sclera clear  ENMT: No tonsillar erythema, exudates, or enlargement; Moist mucous membranes, Good dentition, No lesions  NECK: Supple, No JVD, Normal thyroid  NERVOUS SYSTEM:  Alert & Oriented X3, Good concentration;  CHEST/LUNG: Clear to auscultation bilaterally; No rales, rhonchi, wheezing, or rubs  HEART: Regular rate and rhythm; No murmurs, rubs, or gallops  ABDOMEN: Soft, Nontender, Nondistended; Bowel sounds present  EXTREMITIES:  2+ Peripheral Pulses, No clubbing, cyanosis, or edema      LABS:              CAPILLARY BLOOD GLUCOSE          RADIOLOGY & ADDITIONAL STUDIES:    EKG:   Personally Reviewed:  [ ] YES     Imaging:   Personally Reviewed:  [ ] YES     Consultant(s) Notes Reviewed:      Care Discussed with Consultants/Other Providers:

## 2020-11-05 NOTE — PROGRESS NOTE ADULT - SUBJECTIVE AND OBJECTIVE BOX
physical exam at bedside  right knee pain   vital signs stable  Allergic to fentanyl, dilaudid, iodine, codeine  took pepcid today  last solids 11/4/2020  used mupirocin and chlorhexadine as directed

## 2020-11-05 NOTE — PRE-OP CHECKLIST - HAND OFF
yes
PAST MEDICAL HISTORY:  Basal cell carcinoma face left upper lip    Diverticulosis     Hyperlipidemia     Hypothyroidism     Melanoma left arm

## 2020-11-06 LAB
ANION GAP SERPL CALC-SCNC: 9 MMOL/L — SIGNIFICANT CHANGE UP (ref 5–17)
BUN SERPL-MCNC: 17 MG/DL — SIGNIFICANT CHANGE UP (ref 7–23)
CALCIUM SERPL-MCNC: 8.7 MG/DL — SIGNIFICANT CHANGE UP (ref 8.4–10.5)
CHLORIDE SERPL-SCNC: 108 MMOL/L — SIGNIFICANT CHANGE UP (ref 96–108)
CO2 SERPL-SCNC: 27 MMOL/L — SIGNIFICANT CHANGE UP (ref 22–31)
CREAT SERPL-MCNC: 0.77 MG/DL — SIGNIFICANT CHANGE UP (ref 0.5–1.3)
GLUCOSE SERPL-MCNC: 117 MG/DL — HIGH (ref 70–99)
HCT VFR BLD CALC: 34.7 % — SIGNIFICANT CHANGE UP (ref 34.5–45)
HGB BLD-MCNC: 11.6 G/DL — SIGNIFICANT CHANGE UP (ref 11.5–15.5)
MCHC RBC-ENTMCNC: 32.6 PG — SIGNIFICANT CHANGE UP (ref 27–34)
MCHC RBC-ENTMCNC: 33.4 GM/DL — SIGNIFICANT CHANGE UP (ref 32–36)
MCV RBC AUTO: 97.5 FL — SIGNIFICANT CHANGE UP (ref 80–100)
NRBC # BLD: 0 /100 WBCS — SIGNIFICANT CHANGE UP (ref 0–0)
PLATELET # BLD AUTO: 174 K/UL — SIGNIFICANT CHANGE UP (ref 150–400)
POTASSIUM SERPL-MCNC: 5.1 MMOL/L — SIGNIFICANT CHANGE UP (ref 3.5–5.3)
POTASSIUM SERPL-SCNC: 5.1 MMOL/L — SIGNIFICANT CHANGE UP (ref 3.5–5.3)
RBC # BLD: 3.56 M/UL — LOW (ref 3.8–5.2)
RBC # FLD: 11.6 % — SIGNIFICANT CHANGE UP (ref 10.3–14.5)
SODIUM SERPL-SCNC: 144 MMOL/L — SIGNIFICANT CHANGE UP (ref 135–145)
WBC # BLD: 12.04 K/UL — HIGH (ref 3.8–10.5)
WBC # FLD AUTO: 12.04 K/UL — HIGH (ref 3.8–10.5)

## 2020-11-06 PROCEDURE — 99233 SBSQ HOSP IP/OBS HIGH 50: CPT

## 2020-11-06 PROCEDURE — 93971 EXTREMITY STUDY: CPT | Mod: 26,RT

## 2020-11-06 RX ORDER — ACETAMINOPHEN 500 MG
2 TABLET ORAL
Qty: 0 | Refills: 0 | DISCHARGE
Start: 2020-11-06

## 2020-11-06 RX ORDER — POLYETHYLENE GLYCOL 3350 17 G/17G
17 POWDER, FOR SOLUTION ORAL
Qty: 0 | Refills: 0 | DISCHARGE
Start: 2020-11-06

## 2020-11-06 RX ORDER — FAMOTIDINE 10 MG/ML
1 INJECTION INTRAVENOUS
Qty: 0 | Refills: 0 | DISCHARGE

## 2020-11-06 RX ORDER — ACETAMINOPHEN 500 MG
2 TABLET ORAL
Qty: 0 | Refills: 0 | DISCHARGE

## 2020-11-06 RX ORDER — ASPIRIN/CALCIUM CARB/MAGNESIUM 324 MG
1 TABLET ORAL
Qty: 60 | Refills: 0
Start: 2020-11-06 | End: 2020-12-05

## 2020-11-06 RX ORDER — SENNA PLUS 8.6 MG/1
2 TABLET ORAL
Qty: 0 | Refills: 0 | DISCHARGE
Start: 2020-11-06

## 2020-11-06 RX ORDER — HYDROMORPHONE HYDROCHLORIDE 2 MG/ML
2 INJECTION INTRAMUSCULAR; INTRAVENOUS; SUBCUTANEOUS
Qty: 60 | Refills: 0
Start: 2020-11-06 | End: 2020-11-11

## 2020-11-06 RX ADMIN — POLYETHYLENE GLYCOL 3350 17 GRAM(S): 17 POWDER, FOR SOLUTION ORAL at 12:05

## 2020-11-06 RX ADMIN — HYDROMORPHONE HYDROCHLORIDE 4 MILLIGRAM(S): 2 INJECTION INTRAMUSCULAR; INTRAVENOUS; SUBCUTANEOUS at 13:00

## 2020-11-06 RX ADMIN — CELECOXIB 200 MILLIGRAM(S): 200 CAPSULE ORAL at 09:01

## 2020-11-06 RX ADMIN — CELECOXIB 200 MILLIGRAM(S): 200 CAPSULE ORAL at 21:32

## 2020-11-06 RX ADMIN — HYDROMORPHONE HYDROCHLORIDE 2 MILLIGRAM(S): 2 INJECTION INTRAMUSCULAR; INTRAVENOUS; SUBCUTANEOUS at 00:12

## 2020-11-06 RX ADMIN — Medication 1000 MILLIGRAM(S): at 04:08

## 2020-11-06 RX ADMIN — Medication 1000 MILLIGRAM(S): at 09:00

## 2020-11-06 RX ADMIN — HYDROMORPHONE HYDROCHLORIDE 4 MILLIGRAM(S): 2 INJECTION INTRAMUSCULAR; INTRAVENOUS; SUBCUTANEOUS at 12:04

## 2020-11-06 RX ADMIN — Medication 1000 MILLIGRAM(S): at 15:40

## 2020-11-06 RX ADMIN — Medication 1000 MILLIGRAM(S): at 08:58

## 2020-11-06 RX ADMIN — ATORVASTATIN CALCIUM 10 MILLIGRAM(S): 80 TABLET, FILM COATED ORAL at 21:32

## 2020-11-06 RX ADMIN — FAMOTIDINE 20 MILLIGRAM(S): 10 INJECTION INTRAVENOUS at 17:45

## 2020-11-06 RX ADMIN — Medication 400 MILLIGRAM(S): at 01:33

## 2020-11-06 RX ADMIN — HYDROMORPHONE HYDROCHLORIDE 2 MILLIGRAM(S): 2 INJECTION INTRAMUSCULAR; INTRAVENOUS; SUBCUTANEOUS at 05:35

## 2020-11-06 RX ADMIN — HYDROMORPHONE HYDROCHLORIDE 2 MILLIGRAM(S): 2 INJECTION INTRAMUSCULAR; INTRAVENOUS; SUBCUTANEOUS at 06:05

## 2020-11-06 RX ADMIN — FAMOTIDINE 20 MILLIGRAM(S): 10 INJECTION INTRAVENOUS at 05:33

## 2020-11-06 RX ADMIN — CELECOXIB 200 MILLIGRAM(S): 200 CAPSULE ORAL at 08:59

## 2020-11-06 RX ADMIN — Medication 1000 MILLIGRAM(S): at 16:00

## 2020-11-06 RX ADMIN — HYDROMORPHONE HYDROCHLORIDE 2 MILLIGRAM(S): 2 INJECTION INTRAMUSCULAR; INTRAVENOUS; SUBCUTANEOUS at 16:30

## 2020-11-06 RX ADMIN — CELECOXIB 200 MILLIGRAM(S): 200 CAPSULE ORAL at 21:40

## 2020-11-06 RX ADMIN — Medication 100 MILLIGRAM(S): at 05:34

## 2020-11-06 RX ADMIN — HYDROMORPHONE HYDROCHLORIDE 2 MILLIGRAM(S): 2 INJECTION INTRAMUSCULAR; INTRAVENOUS; SUBCUTANEOUS at 15:40

## 2020-11-06 RX ADMIN — APIXABAN 2.5 MILLIGRAM(S): 2.5 TABLET, FILM COATED ORAL at 12:04

## 2020-11-06 NOTE — OCCUPATIONAL THERAPY INITIAL EVALUATION ADULT - ADDITIONAL COMMENTS
Pt lives in a pvt home alone. 2 steps without HR, then 4 GRZEGORZ with 1 HR. 1 flight to bed and bath on 2nd floor. 1 flight down to office in basement. Pt owns a stall and tub shower with GBs. Has Commode X3, Raised toilet seat with armrests, RW x3, SAC, WBQC, and SC x2.

## 2020-11-06 NOTE — PROGRESS NOTE ADULT - SUBJECTIVE AND OBJECTIVE BOX
SUBJECTIVE: Patient seen and examined. No nausea/vomiting, nor shortness of breath.  Patient states that the numbness she was experiencing yesterdays post op check has improved and no longer is numb.     OBJECTIVE:     Vital Signs Last 24 Hrs  T(C): 36.5 (06 Nov 2020 03:50), Max: 36.9 (05 Nov 2020 15:15)  T(F): 97.7 (06 Nov 2020 03:50), Max: 98.4 (05 Nov 2020 15:15)  HR: 64 (06 Nov 2020 03:50) (63 - 95)  BP: 102/63 (06 Nov 2020 03:50) (93/44 - 121/80)  BP(mean): --  RR: 15 (06 Nov 2020 03:50) (12 - 21)  SpO2: 99% (06 Nov 2020 03:50) (95% - 100%)    PAIN SCORE:   1      SCALE USED: (1-10 VNRS)        Affected extremity:          Dressing: clean/dry/intact  post operative dressing of VIKY and the ace wrap was removed         Sensation:  intact to light touch of bilateral feet both medial and lateral feet         Motor exam:          5/ 5 Tibialis Anterior/Gastrocnemius-Soleus , EHL         warm well perfused; capillary refill <3 seconds, +2 DP    LABS:                        11.6   12.04 )-----------( 174      ( 06 Nov 2020 06:24 )             34.7     11-06    144  |  108  |  17  ----------------------------<  117<H>  5.1   |  27  |  0.77    Ca    8.7      06 Nov 2020 06:24        CAPILLARY BLOOD GLUCOSE          MEDICATIONS:    Anticoagulation:  apixaban 2.5 milliGRAM(s) Oral every 12 hours      Antibiotics:       Pain medications:   acetaminophen   Tablet .. 1000 milliGRAM(s) Oral every 8 hours  celecoxib 200 milliGRAM(s) Oral every 12 hours  HYDROmorphone   Tablet 2 milliGRAM(s) Oral every 3 hours PRN  HYDROmorphone   Tablet 4 milliGRAM(s) Oral every 3 hours PRN  HYDROmorphone  Injectable 0.5 milliGRAM(s) IV Push every 3 hours PRN  ondansetron Injectable 4 milliGRAM(s) IV Push every 6 hours PRN      A/P :  s/p Right TKR   POD # 1  -    Pain control  -    DVT ppx: Eliquis  -    Check AM labs  -    Weight bearing status: WBAT   -    Physical Therapy  -    Dispo: Home

## 2020-11-06 NOTE — PROGRESS NOTE ADULT - ASSESSMENT
69F HLD, GERD and OA admitted for aftercare following Right TKR.     S/P Right TKR  POD 1  Continue Bowel and pain control regimen.   Incentive Spirometer for lung expansion.  Work with PT to increase ambulation as per orthopedics.  Monitor Hgb and follow up electrolytes.   Orthopedics on board and following     HLD  Statin    GERD  Protonix    Diet  Regular    DVT Prophylaxis  Asprin BID    Disposition  Full Code/Inpatient  Patient medically optimized for discharge home if cleared by PT and Ortho.

## 2020-11-06 NOTE — PROGRESS NOTE ADULT - SUBJECTIVE AND OBJECTIVE BOX
Discharge medication calendar:  Eliquis 2.5mg q12h x 12 days then ASA EC 81mg q12h x 4 weeks  APAP 1000mg q8h x 2-3 weeks  Celecoxib 200mg q12h x 2-3 weeks  Omeprazole 20mg QAM x 6 weeks  Narcotic PRN  Docusate 100mg TID while taking narcotic  Miralax, Senna, or Bisacodyl PRN for treatment of constipation

## 2020-11-06 NOTE — PROGRESS NOTE ADULT - SUBJECTIVE AND OBJECTIVE BOX
Subjective: Patient seen and examined. Doing well with no overnight events.     MEDICATIONS  (STANDING):  acetaminophen   Tablet .. 1000 milliGRAM(s) Oral every 8 hours  apixaban 2.5 milliGRAM(s) Oral every 12 hours  atorvastatin 10 milliGRAM(s) Oral at bedtime  celecoxib 200 milliGRAM(s) Oral every 12 hours  famotidine    Tablet 20 milliGRAM(s) Oral two times a day  lactated ringers. 1000 milliLiter(s) (125 mL/Hr) IV Continuous <Continuous>  polyethylene glycol 3350 17 Gram(s) Oral daily    MEDICATIONS  (PRN):  aluminum hydroxide/magnesium hydroxide/simethicone Suspension 30 milliLiter(s) Oral four times a day PRN Indigestion  HYDROmorphone   Tablet 2 milliGRAM(s) Oral every 3 hours PRN Mild Pain (1 - 3)  HYDROmorphone   Tablet 4 milliGRAM(s) Oral every 3 hours PRN Moderate Pain (4 - 6)  HYDROmorphone  Injectable 0.5 milliGRAM(s) IV Push every 3 hours PRN Severe Pain (7 - 10)  magnesium hydroxide Suspension 30 milliLiter(s) Oral daily PRN Constipation  ondansetron Injectable 4 milliGRAM(s) IV Push every 6 hours PRN Nausea and/or Vomiting  senna 2 Tablet(s) Oral at bedtime PRN Constipation      Allergies    codeine (Hives; Anaphylaxis)  fentanyl (Pruritus; Urticaria)  iodine (Anaphylaxis)    Intolerances        Vital Signs Last 24 Hrs  T(C): 36.7 (06 Nov 2020 11:29), Max: 36.9 (05 Nov 2020 15:15)  T(F): 98 (06 Nov 2020 11:29), Max: 98.4 (05 Nov 2020 15:15)  HR: 62 (06 Nov 2020 11:29) (61 - 95)  BP: 112/74 (06 Nov 2020 11:29) (93/44 - 121/80)  BP(mean): --  RR: 18 (06 Nov 2020 11:29) (12 - 18)  SpO2: 99% (06 Nov 2020 11:29) (95% - 100%)    PHYSICAL EXAM:  GENERAL: NAD, well-groomed, well-developed  HEAD:  Atraumatic, Normocephalic  ENMT: Moist mucous membranes,   NECK: Supple, No JVD, Normal thyroid  NERVOUS SYSTEM:  All 4 extremities mobile, no gross sensory deficits.   CHEST/LUNG: Clear to auscultation bilaterally; No rales, rhonchi, wheezing, or rubs  HEART: Regular rate and rhythm; No murmurs, rubs, or gallops  ABDOMEN: Soft, Nontender, Nondistended; Bowel sounds present  EXTREMITIES:  2+ Peripheral Pulses, No clubbing, cyanosis, or edema      LABS:                        11.6   12.04 )-----------( 174      ( 06 Nov 2020 06:24 )             34.7     06 Nov 2020 06:24    144    |  108    |  17     ----------------------------<  117    5.1     |  27     |  0.77     Ca    8.7        06 Nov 2020 06:24          CAPILLARY BLOOD GLUCOSE          RADIOLOGY & ADDITIONAL TESTS:    Imaging Personally Reviewed:  [ ] YES     Consultant(s) Notes Reviewed:      Care Discussed with Consultants/Other Providers:    Advanced Directives: [ ] DNR  [ ] No feeding tube  [ ] MOLST in chart  [ ] MOLST completed today  [ ] Unknown

## 2020-11-07 VITALS
RESPIRATION RATE: 18 BRPM | HEART RATE: 79 BPM | TEMPERATURE: 97 F | SYSTOLIC BLOOD PRESSURE: 113 MMHG | OXYGEN SATURATION: 98 % | DIASTOLIC BLOOD PRESSURE: 64 MMHG

## 2020-11-07 LAB
ANION GAP SERPL CALC-SCNC: 6 MMOL/L — SIGNIFICANT CHANGE UP (ref 5–17)
BUN SERPL-MCNC: 27 MG/DL — HIGH (ref 7–23)
CALCIUM SERPL-MCNC: 8.8 MG/DL — SIGNIFICANT CHANGE UP (ref 8.4–10.5)
CHLORIDE SERPL-SCNC: 107 MMOL/L — SIGNIFICANT CHANGE UP (ref 96–108)
CO2 SERPL-SCNC: 28 MMOL/L — SIGNIFICANT CHANGE UP (ref 22–31)
CREAT SERPL-MCNC: 0.81 MG/DL — SIGNIFICANT CHANGE UP (ref 0.5–1.3)
GLUCOSE SERPL-MCNC: 84 MG/DL — SIGNIFICANT CHANGE UP (ref 70–99)
HCT VFR BLD CALC: 31.4 % — LOW (ref 34.5–45)
HGB BLD-MCNC: 10.5 G/DL — LOW (ref 11.5–15.5)
MCHC RBC-ENTMCNC: 32.6 PG — SIGNIFICANT CHANGE UP (ref 27–34)
MCHC RBC-ENTMCNC: 33.4 GM/DL — SIGNIFICANT CHANGE UP (ref 32–36)
MCV RBC AUTO: 97.5 FL — SIGNIFICANT CHANGE UP (ref 80–100)
NRBC # BLD: 0 /100 WBCS — SIGNIFICANT CHANGE UP (ref 0–0)
PLATELET # BLD AUTO: 139 K/UL — LOW (ref 150–400)
POTASSIUM SERPL-MCNC: 4.3 MMOL/L — SIGNIFICANT CHANGE UP (ref 3.5–5.3)
POTASSIUM SERPL-SCNC: 4.3 MMOL/L — SIGNIFICANT CHANGE UP (ref 3.5–5.3)
RBC # BLD: 3.22 M/UL — LOW (ref 3.8–5.2)
RBC # FLD: 11.9 % — SIGNIFICANT CHANGE UP (ref 10.3–14.5)
SODIUM SERPL-SCNC: 141 MMOL/L — SIGNIFICANT CHANGE UP (ref 135–145)
WBC # BLD: 5.45 K/UL — SIGNIFICANT CHANGE UP (ref 3.8–10.5)
WBC # FLD AUTO: 5.45 K/UL — SIGNIFICANT CHANGE UP (ref 3.8–10.5)

## 2020-11-07 PROCEDURE — 73562 X-RAY EXAM OF KNEE 3: CPT

## 2020-11-07 PROCEDURE — C1889: CPT

## 2020-11-07 PROCEDURE — 85027 COMPLETE CBC AUTOMATED: CPT

## 2020-11-07 PROCEDURE — 88311 DECALCIFY TISSUE: CPT

## 2020-11-07 PROCEDURE — 97116 GAIT TRAINING THERAPY: CPT

## 2020-11-07 PROCEDURE — 86850 RBC ANTIBODY SCREEN: CPT

## 2020-11-07 PROCEDURE — 88305 TISSUE EXAM BY PATHOLOGIST: CPT

## 2020-11-07 PROCEDURE — 97530 THERAPEUTIC ACTIVITIES: CPT

## 2020-11-07 PROCEDURE — 86901 BLOOD TYPING SEROLOGIC RH(D): CPT

## 2020-11-07 PROCEDURE — 97165 OT EVAL LOW COMPLEX 30 MIN: CPT

## 2020-11-07 PROCEDURE — 97110 THERAPEUTIC EXERCISES: CPT

## 2020-11-07 PROCEDURE — 97535 SELF CARE MNGMENT TRAINING: CPT

## 2020-11-07 PROCEDURE — 94664 DEMO&/EVAL PT USE INHALER: CPT

## 2020-11-07 PROCEDURE — 36415 COLL VENOUS BLD VENIPUNCTURE: CPT

## 2020-11-07 PROCEDURE — 86900 BLOOD TYPING SEROLOGIC ABO: CPT

## 2020-11-07 PROCEDURE — C1776: CPT

## 2020-11-07 PROCEDURE — 80048 BASIC METABOLIC PNL TOTAL CA: CPT

## 2020-11-07 PROCEDURE — C1713: CPT

## 2020-11-07 PROCEDURE — 93971 EXTREMITY STUDY: CPT

## 2020-11-07 PROCEDURE — 99233 SBSQ HOSP IP/OBS HIGH 50: CPT

## 2020-11-07 PROCEDURE — G0463: CPT

## 2020-11-07 PROCEDURE — 97161 PT EVAL LOW COMPLEX 20 MIN: CPT

## 2020-11-07 RX ORDER — FAMOTIDINE 10 MG/ML
1 INJECTION INTRAVENOUS
Qty: 60 | Refills: 1
Start: 2020-11-07 | End: 2021-01-05

## 2020-11-07 RX ORDER — FAMOTIDINE 10 MG/ML
1 INJECTION INTRAVENOUS
Qty: 0 | Refills: 0 | DISCHARGE

## 2020-11-07 RX ADMIN — POLYETHYLENE GLYCOL 3350 17 GRAM(S): 17 POWDER, FOR SOLUTION ORAL at 12:14

## 2020-11-07 RX ADMIN — HYDROMORPHONE HYDROCHLORIDE 2 MILLIGRAM(S): 2 INJECTION INTRAMUSCULAR; INTRAVENOUS; SUBCUTANEOUS at 08:55

## 2020-11-07 RX ADMIN — Medication 1000 MILLIGRAM(S): at 00:08

## 2020-11-07 RX ADMIN — CELECOXIB 200 MILLIGRAM(S): 200 CAPSULE ORAL at 08:28

## 2020-11-07 RX ADMIN — FAMOTIDINE 20 MILLIGRAM(S): 10 INJECTION INTRAVENOUS at 06:28

## 2020-11-07 RX ADMIN — Medication 1000 MILLIGRAM(S): at 08:28

## 2020-11-07 RX ADMIN — Medication 1000 MILLIGRAM(S): at 00:15

## 2020-11-07 RX ADMIN — APIXABAN 2.5 MILLIGRAM(S): 2.5 TABLET, FILM COATED ORAL at 08:24

## 2020-11-07 RX ADMIN — Medication 1000 MILLIGRAM(S): at 08:24

## 2020-11-07 RX ADMIN — HYDROMORPHONE HYDROCHLORIDE 2 MILLIGRAM(S): 2 INJECTION INTRAMUSCULAR; INTRAVENOUS; SUBCUTANEOUS at 00:30

## 2020-11-07 RX ADMIN — HYDROMORPHONE HYDROCHLORIDE 2 MILLIGRAM(S): 2 INJECTION INTRAMUSCULAR; INTRAVENOUS; SUBCUTANEOUS at 08:24

## 2020-11-07 RX ADMIN — CELECOXIB 200 MILLIGRAM(S): 200 CAPSULE ORAL at 08:24

## 2020-11-07 RX ADMIN — APIXABAN 2.5 MILLIGRAM(S): 2.5 TABLET, FILM COATED ORAL at 00:08

## 2020-11-07 RX ADMIN — HYDROMORPHONE HYDROCHLORIDE 2 MILLIGRAM(S): 2 INJECTION INTRAMUSCULAR; INTRAVENOUS; SUBCUTANEOUS at 00:08

## 2020-11-07 NOTE — PROGRESS NOTE ADULT - SUBJECTIVE AND OBJECTIVE BOX
Patient is a 69y old  Female who presents with a chief complaint of Right total knee replacement (07 Nov 2020 08:13)      INTERVAL HPI/OVERNIGHT EVENTS:    No overnight events.      MEDICATIONS  (STANDING):  acetaminophen   Tablet .. 1000 milliGRAM(s) Oral every 8 hours  apixaban 2.5 milliGRAM(s) Oral every 12 hours  atorvastatin 10 milliGRAM(s) Oral at bedtime  celecoxib 200 milliGRAM(s) Oral every 12 hours  famotidine    Tablet 20 milliGRAM(s) Oral two times a day  lactated ringers. 1000 milliLiter(s) (125 mL/Hr) IV Continuous <Continuous>  polyethylene glycol 3350 17 Gram(s) Oral daily    MEDICATIONS  (PRN):  aluminum hydroxide/magnesium hydroxide/simethicone Suspension 30 milliLiter(s) Oral four times a day PRN Indigestion  bisacodyl Suppository 10 milliGRAM(s) Rectal daily PRN If no bowel movement by postoperative day #2  HYDROmorphone   Tablet 2 milliGRAM(s) Oral every 3 hours PRN Mild Pain (1 - 3)  HYDROmorphone   Tablet 4 milliGRAM(s) Oral every 3 hours PRN Moderate Pain (4 - 6)  HYDROmorphone  Injectable 0.5 milliGRAM(s) IV Push every 3 hours PRN Severe Pain (7 - 10)  magnesium hydroxide Suspension 30 milliLiter(s) Oral daily PRN Constipation  ondansetron Injectable 4 milliGRAM(s) IV Push every 6 hours PRN Nausea and/or Vomiting  senna 2 Tablet(s) Oral at bedtime PRN Constipation      Allergies    codeine (Hives; Anaphylaxis)  fentanyl (Pruritus; Urticaria)  iodine (Anaphylaxis)    Intolerances        REVIEW OF SYSTEMS:  CONSTITUTIONAL: No fever, weight loss, or fatigue  RESPIRATORY: No cough, wheezing, chills or hemoptysis; No shortness of breath  CARDIOVASCULAR: No chest pain, palpitations, lightheadedness, or leg swelling  GASTROINTESTINAL: No abdominal or epigastric pain. No nausea, vomiting, or hematemesis; No diarrhea or constipation. No melena or hematochezia.  GENITOURINARY: No dysuria, frequency, hematuria, or incontinence  NEUROLOGICAL: No headaches, memory loss, vertigo, loss of strength, numbness, or tremors  MUSCULOSKELETAL: No joint pain or swelling; No muscle, back, or extremity pain      Vital Signs Last 24 Hrs  T(C): 36.7 (07 Nov 2020 07:43), Max: 36.7 (06 Nov 2020 11:29)  T(F): 98.1 (07 Nov 2020 07:43), Max: 98.1 (06 Nov 2020 15:38)  HR: 71 (07 Nov 2020 07:43) (62 - 71)  BP: 110/73 (07 Nov 2020 07:43) (94/60 - 112/74)  BP(mean): --  RR: 18 (07 Nov 2020 07:43) (16 - 18)  SpO2: 97% (07 Nov 2020 07:43) (95% - 99%)    PHYSICAL EXAM:  GENERAL: NAD, well-groomed, well-developed  HEAD:  Atraumatic, Normocephalic  ENMT: Moist mucous membranes,   NECK: Supple, No JVD, Normal thyroid  NERVOUS SYSTEM:  All 4 extremities mobile, no gross sensory deficits.   CHEST/LUNG: Clear to auscultation bilaterally; No rales, rhonchi, wheezing, or rubs  HEART: Regular rate and rhythm; No murmurs, rubs, or gallops  ABDOMEN: Soft, Nontender, Nondistended; Bowel sounds present  EXTREMITIES:  2+ Peripheral Pulses, No clubbing, cyanosis, or edema    LABS:                        10.5   5.45  )-----------( 139      ( 07 Nov 2020 06:12 )             31.4     07 Nov 2020 06:12    141    |  107    |  27     ----------------------------<  84     4.3     |  28     |  0.81     Ca    8.8        07 Nov 2020 06:12          CAPILLARY BLOOD GLUCOSE          RADIOLOGY & ADDITIONAL TESTS:    Imaging Personally Reviewed:  [ ] YES     Consultant(s) Notes Reviewed:      Care Discussed with Consultants/Other Providers:    Advanced Directives: [ ] DNR  [ ] No feeding tube  [ ] MOLST in chart  [ ] MOLST completed today  [ ] Unknown

## 2020-11-07 NOTE — PROGRESS NOTE ADULT - ASSESSMENT
69F HLD, GERD and OA admitted for aftercare following Right TKR.     S/P Right TKR  POD 2  Continue Bowel and pain control regimen.   Incentive Spirometer for lung expansion.  Work with PT to increase ambulation as per orthopedics.  Monitor Hgb and follow up electrolytes.   Orthopedics on board and following     HLD  Statin    GERD  Protonix    Diet  Regular    DVT Prophylaxis  Asprin BID    Disposition  Full Code/Inpatient  Patient medically optimized for discharge home if cleared by PT and Ortho.

## 2020-11-07 NOTE — PROGRESS NOTE ADULT - SUBJECTIVE AND OBJECTIVE BOX
OLEG LEO  [id number]    Pt is a 69y year old Female s/p right TKR. pain is 3/10. (+) Voids, tolerating regular diet. Denies chest pain/shortness of breath/nausea/vomitting.     Vital Signs Last 24 Hrs  T(C): 36.7 (07 Nov 2020 07:43), Max: 36.7 (06 Nov 2020 11:29)  T(F): 98.1 (07 Nov 2020 07:43), Max: 98.1 (06 Nov 2020 15:38)  HR: 71 (07 Nov 2020 07:43) (62 - 71)  BP: 110/73 (07 Nov 2020 07:43) (94/60 - 112/74)  BP(mean): --  RR: 18 (07 Nov 2020 07:43) (16 - 18)  SpO2: 97% (07 Nov 2020 07:43) (95% - 99%)                              10.5   5.45  )-----------( 139      ( 07 Nov 2020 06:12 )             31.4     11-07    141  |  107  |  27<H>  ----------------------------<  84  4.3   |  28  |  0.81    Ca    8.8      07 Nov 2020 06:12          PE:   RLE: VIKY dressing in place, no erythema, no drainage on VIKY, VIKY intact SILT distally, (+2) DP/PT pulses, EHL/FHL/TA intact, Capillary refill < 2 seconds. negative calf tenderness.PAS on,    A: 69y year old Female s/p right TKR POD#2    Plan:   -DVT ppx =  apixaban 2.5 milliGRAM(s) Oral every 12 hours    -PT/OT = OOB  -Pain control   -Medicine to follow   -continue to follow Labs  -continue use of PAS  -Dispo: Home today pending clearance

## 2020-11-20 ENCOUNTER — APPOINTMENT (OUTPATIENT)
Dept: ORTHOPEDIC SURGERY | Facility: CLINIC | Age: 70
End: 2020-11-20
Payer: MEDICARE

## 2020-11-20 VITALS
SYSTOLIC BLOOD PRESSURE: 129 MMHG | HEIGHT: 68 IN | HEART RATE: 83 BPM | TEMPERATURE: 97.3 F | DIASTOLIC BLOOD PRESSURE: 82 MMHG | BODY MASS INDEX: 23.49 KG/M2 | WEIGHT: 155 LBS

## 2020-11-20 PROCEDURE — 99024 POSTOP FOLLOW-UP VISIT: CPT

## 2020-11-20 PROCEDURE — 73562 X-RAY EXAM OF KNEE 3: CPT | Mod: RT

## 2021-01-05 ENCOUNTER — APPOINTMENT (OUTPATIENT)
Dept: ORTHOPEDIC SURGERY | Facility: CLINIC | Age: 71
End: 2021-01-05
Payer: MEDICARE

## 2021-01-05 VITALS — BODY MASS INDEX: 23.49 KG/M2 | TEMPERATURE: 97.9 F | HEIGHT: 68 IN | WEIGHT: 155 LBS

## 2021-01-05 DIAGNOSIS — Z96.651 PRESENCE OF RIGHT ARTIFICIAL KNEE JOINT: ICD-10-CM

## 2021-01-05 PROCEDURE — 20610 DRAIN/INJ JOINT/BURSA W/O US: CPT | Mod: LT,79

## 2021-01-05 PROCEDURE — 73562 X-RAY EXAM OF KNEE 3: CPT | Mod: 50

## 2021-01-05 PROCEDURE — 99024 POSTOP FOLLOW-UP VISIT: CPT

## 2021-01-05 RX ADMIN — Medication 3 %: at 00:00

## 2021-01-05 RX ADMIN — Medication 0 MG/3ML: at 00:00

## 2021-01-07 RX ORDER — LIDOCAINE HYDROCHLORIDE 10 MG/ML
1 INJECTION, SOLUTION INFILTRATION; PERINEURAL
Refills: 0 | Status: COMPLETED | OUTPATIENT
Start: 2021-01-05

## 2021-01-07 RX ORDER — HYALURONATE SOD, CROSS-LINKED 30 MG/3 ML
30 SYRINGE (ML) INTRAARTICULAR
Refills: 0 | Status: COMPLETED | OUTPATIENT
Start: 2021-01-05

## 2021-03-16 ENCOUNTER — APPOINTMENT (OUTPATIENT)
Dept: ORTHOPEDIC SURGERY | Facility: CLINIC | Age: 71
End: 2021-03-16
Payer: MEDICARE

## 2021-03-16 VITALS — TEMPERATURE: 97.2 F | SYSTOLIC BLOOD PRESSURE: 161 MMHG | HEART RATE: 93 BPM | DIASTOLIC BLOOD PRESSURE: 94 MMHG

## 2021-03-16 DIAGNOSIS — Z91.81 HISTORY OF FALLING: ICD-10-CM

## 2021-03-16 DIAGNOSIS — Z96.649 PRESENCE OF UNSPECIFIED ARTIFICIAL HIP JOINT: ICD-10-CM

## 2021-03-16 PROCEDURE — 73502 X-RAY EXAM HIP UNI 2-3 VIEWS: CPT | Mod: RT

## 2021-03-16 PROCEDURE — 99213 OFFICE O/P EST LOW 20 MIN: CPT

## 2021-03-16 NOTE — DISCUSSION/SUMMARY
[Medication Risks Reviewed] : Medication risks reviewed [de-identified] : The patient was advised to continue to use ibuprofen and Tylenol for analgesic relief. She will followup with her neurologist on March 22, 2021 as scheduled. She was advised if she needs to followup with a spine surgeon but we have to physicians here in the office.\par Patient had questions as to give a clear understanding of the instructions. She was eventually called the office at any time with any questions or concerns. The we'll followup as needed

## 2021-03-16 NOTE — HISTORY OF PRESENT ILLNESS
[Pain Location] : pain [Stable] : stable [___ mths] : [unfilled] month(s) ago [4] : a minimum pain level of 4/10 [7] : a maximum pain level of 7/10 [Standing] : standing [Daily] : ~He/She~ states the symptoms seem to be occuring daily [Bending] : worsened by bending [Direct Pressure] : worsened by direct pressure [Lifting] : worsened by lifting [Sitting] : worsened by sitting [Walking] : worsened by walking [Knee Flexion] : worsened with knee flexion [Acetaminophen] : relieved by acetaminophen [Ice] : relieved by ice [NSAIDs] : relieved by nonsteroidal anti-inflammatory drugs [Physical Therapy] : relieved by physical therapy [de-identified] : Patient is a 70-year-old female who presents today for evaluation of her right hip. Patient reports that she had a right hip revision in 2016 by Dr. Sarmiento; her original total hip surgery was performed in 2003 by Dr. Deluca. She reports overall her hip feels very good until she does too much activity and sometimes has inflammatory symptoms.\par Patient reports that on February 4, 2021 she was going down the stairs her left knee buckled and she fell down a flight of stairs. She was bruised to bilateral knees and her elbow. She did not seek emergency room treatment secondary to coated. She did use ibuprofen and was feeling better after a few days. On February 28, 2021 she was sitting in the chair she went to stand up and she went into severe back spasm for which she used diazepam with good relief.\par The patient reports she does have a history of herniated discs and is following up with her neurologist on March 22, 2021; but wanted to have her hip evaluated prior to seeing a neurologist. She continues to use ibuprofen and Tylenol for analgesia. She is using a cane for ambulation since a fall. [Knee Extension] : not worsened by knee extension

## 2021-03-16 NOTE — PHYSICAL EXAM
[Antalgic] : antalgic [LE] : Sensory: Intact in bilateral lower extremities [Knee] : patellar 2+ and symmetric bilaterally [DP] : dorsalis pedis 2+ and symmetric bilaterally [Hip Muscle Tightness 90-90 Straight Leg Raising Test Left] : positive straight leg raise [FreeTextEntry2] : The right hip flexes easily to 90°. The right hip can externally rotate and internally rotate with minimal discomfort and stiffness. Leg lengths appear perfectly equal.\par  Patient has a stable gait with minimal limp utilizing her cane.\par (+) tenderness to the right lower back - right buttock area with palpation.\par (+) straight leg raise bilaterally lower back pain [de-identified] : Radiographs including one view of the pelvis and one of the right hip were obtained at today's visit. The x-rays revealed a well-positioned well fixed total hip replacement in excellent position and alignment. There is no obvious evidence of any fracture dislocation or loosening. X-rays were reviewed with Dr. Sarmiento

## 2021-03-16 NOTE — REASON FOR VISIT
[Follow-Up Visit] : a follow-up visit for [FreeTextEntry2] : Left knee osteoarthritis; fall on 2/4/21.  Complains of back spasms; no pain at this time

## 2021-06-22 DIAGNOSIS — Z01.812 ENCOUNTER FOR PREPROCEDURAL LABORATORY EXAMINATION: ICD-10-CM

## 2021-08-05 DIAGNOSIS — M79.661 PAIN IN RIGHT LOWER LEG: ICD-10-CM

## 2021-09-28 ENCOUNTER — RESULT REVIEW (OUTPATIENT)
Age: 71
End: 2021-09-28

## 2021-09-28 ENCOUNTER — APPOINTMENT (OUTPATIENT)
Dept: MAMMOGRAPHY | Facility: CLINIC | Age: 71
End: 2021-09-28
Payer: MEDICARE

## 2021-09-28 ENCOUNTER — APPOINTMENT (OUTPATIENT)
Dept: ULTRASOUND IMAGING | Facility: CLINIC | Age: 71
End: 2021-09-28
Payer: MEDICARE

## 2021-09-28 PROCEDURE — 77067 SCR MAMMO BI INCL CAD: CPT

## 2021-09-28 PROCEDURE — 76856 US EXAM PELVIC COMPLETE: CPT

## 2021-09-28 PROCEDURE — 76641 ULTRASOUND BREAST COMPLETE: CPT | Mod: 50

## 2021-09-28 PROCEDURE — 77063 BREAST TOMOSYNTHESIS BI: CPT

## 2021-09-28 PROCEDURE — 76830 TRANSVAGINAL US NON-OB: CPT

## 2022-10-05 ENCOUNTER — APPOINTMENT (OUTPATIENT)
Dept: RADIOLOGY | Facility: CLINIC | Age: 72
End: 2022-10-05

## 2022-10-05 ENCOUNTER — APPOINTMENT (OUTPATIENT)
Dept: MAMMOGRAPHY | Facility: CLINIC | Age: 72
End: 2022-10-05

## 2022-10-05 ENCOUNTER — TRANSCRIPTION ENCOUNTER (OUTPATIENT)
Age: 72
End: 2022-10-05

## 2022-10-05 ENCOUNTER — APPOINTMENT (OUTPATIENT)
Dept: ULTRASOUND IMAGING | Facility: CLINIC | Age: 72
End: 2022-10-05

## 2022-10-05 PROCEDURE — 77067 SCR MAMMO BI INCL CAD: CPT

## 2022-10-05 PROCEDURE — 77063 BREAST TOMOSYNTHESIS BI: CPT

## 2022-10-05 PROCEDURE — 76830 TRANSVAGINAL US NON-OB: CPT

## 2022-10-05 PROCEDURE — 76641 ULTRASOUND BREAST COMPLETE: CPT | Mod: 50

## 2022-10-05 PROCEDURE — 76856 US EXAM PELVIC COMPLETE: CPT

## 2022-11-15 ENCOUNTER — APPOINTMENT (OUTPATIENT)
Dept: ORTHOPEDIC SURGERY | Facility: CLINIC | Age: 72
End: 2022-11-15

## 2022-11-15 ENCOUNTER — NON-APPOINTMENT (OUTPATIENT)
Age: 72
End: 2022-11-15

## 2022-11-15 VITALS
BODY MASS INDEX: 24.25 KG/M2 | HEART RATE: 73 BPM | WEIGHT: 160 LBS | SYSTOLIC BLOOD PRESSURE: 134 MMHG | DIASTOLIC BLOOD PRESSURE: 85 MMHG | HEIGHT: 68 IN

## 2022-11-15 DIAGNOSIS — M17.12 UNILATERAL PRIMARY OSTEOARTHRITIS, LEFT KNEE: ICD-10-CM

## 2022-11-15 PROCEDURE — 73562 X-RAY EXAM OF KNEE 3: CPT | Mod: LT

## 2022-11-15 PROCEDURE — 99215 OFFICE O/P EST HI 40 MIN: CPT

## 2022-11-15 RX ORDER — CELECOXIB 200 MG/1
200 CAPSULE ORAL DAILY
Qty: 30 | Refills: 1 | Status: DISCONTINUED | COMMUNITY
Start: 2017-11-07 | End: 2022-11-15

## 2022-12-02 ENCOUNTER — OUTPATIENT (OUTPATIENT)
Dept: OUTPATIENT SERVICES | Facility: HOSPITAL | Age: 72
LOS: 1 days | End: 2022-12-02
Payer: MEDICARE

## 2022-12-02 VITALS
TEMPERATURE: 97 F | SYSTOLIC BLOOD PRESSURE: 131 MMHG | WEIGHT: 164.02 LBS | OXYGEN SATURATION: 98 % | RESPIRATION RATE: 14 BRPM | HEIGHT: 66.5 IN | DIASTOLIC BLOOD PRESSURE: 59 MMHG | HEART RATE: 74 BPM

## 2022-12-02 DIAGNOSIS — M17.12 UNILATERAL PRIMARY OSTEOARTHRITIS, LEFT KNEE: ICD-10-CM

## 2022-12-02 DIAGNOSIS — Z98.890 OTHER SPECIFIED POSTPROCEDURAL STATES: Chronic | ICD-10-CM

## 2022-12-02 DIAGNOSIS — N84.0 POLYP OF CORPUS UTERI: Chronic | ICD-10-CM

## 2022-12-02 DIAGNOSIS — Z96.641 PRESENCE OF RIGHT ARTIFICIAL HIP JOINT: Chronic | ICD-10-CM

## 2022-12-02 DIAGNOSIS — Z96.651 PRESENCE OF RIGHT ARTIFICIAL KNEE JOINT: Chronic | ICD-10-CM

## 2022-12-02 DIAGNOSIS — Z98.89 OTHER SPECIFIED POSTPROCEDURAL STATES: Chronic | ICD-10-CM

## 2022-12-02 LAB
A1C WITH ESTIMATED AVERAGE GLUCOSE RESULT: 5.3 % — SIGNIFICANT CHANGE UP (ref 4–5.6)
ALBUMIN SERPL ELPH-MCNC: 3.8 G/DL — SIGNIFICANT CHANGE UP (ref 3.3–5)
ALP SERPL-CCNC: 47 U/L — SIGNIFICANT CHANGE UP (ref 30–120)
ALT FLD-CCNC: 36 U/L DA — SIGNIFICANT CHANGE UP (ref 10–60)
ANION GAP SERPL CALC-SCNC: 7 MMOL/L — SIGNIFICANT CHANGE UP (ref 5–17)
APTT BLD: 28 SEC — SIGNIFICANT CHANGE UP (ref 27.5–35.5)
AST SERPL-CCNC: 33 U/L — SIGNIFICANT CHANGE UP (ref 10–40)
BILIRUB SERPL-MCNC: 0.4 MG/DL — SIGNIFICANT CHANGE UP (ref 0.2–1.2)
BLD GP AB SCN SERPL QL: SIGNIFICANT CHANGE UP
BUN SERPL-MCNC: 23 MG/DL — SIGNIFICANT CHANGE UP (ref 7–23)
CALCIUM SERPL-MCNC: 9.1 MG/DL — SIGNIFICANT CHANGE UP (ref 8.4–10.5)
CHLORIDE SERPL-SCNC: 104 MMOL/L — SIGNIFICANT CHANGE UP (ref 96–108)
CO2 SERPL-SCNC: 28 MMOL/L — SIGNIFICANT CHANGE UP (ref 22–31)
CREAT SERPL-MCNC: 0.85 MG/DL — SIGNIFICANT CHANGE UP (ref 0.5–1.3)
EGFR: 72 ML/MIN/1.73M2 — SIGNIFICANT CHANGE UP
ESTIMATED AVERAGE GLUCOSE: 105 MG/DL — SIGNIFICANT CHANGE UP (ref 68–114)
GLUCOSE SERPL-MCNC: 85 MG/DL — SIGNIFICANT CHANGE UP (ref 70–99)
HCT VFR BLD CALC: 43.2 % — SIGNIFICANT CHANGE UP (ref 34.5–45)
HGB BLD-MCNC: 14.1 G/DL — SIGNIFICANT CHANGE UP (ref 11.5–15.5)
INR BLD: 0.97 RATIO — SIGNIFICANT CHANGE UP (ref 0.88–1.16)
MCHC RBC-ENTMCNC: 32.2 PG — SIGNIFICANT CHANGE UP (ref 27–34)
MCHC RBC-ENTMCNC: 32.6 GM/DL — SIGNIFICANT CHANGE UP (ref 32–36)
MCV RBC AUTO: 98.6 FL — SIGNIFICANT CHANGE UP (ref 80–100)
NRBC # BLD: 0 /100 WBCS — SIGNIFICANT CHANGE UP (ref 0–0)
PLATELET # BLD AUTO: 189 K/UL — SIGNIFICANT CHANGE UP (ref 150–400)
POTASSIUM SERPL-MCNC: 5 MMOL/L — SIGNIFICANT CHANGE UP (ref 3.5–5.3)
POTASSIUM SERPL-SCNC: 5 MMOL/L — SIGNIFICANT CHANGE UP (ref 3.5–5.3)
PROT SERPL-MCNC: 7.7 G/DL — SIGNIFICANT CHANGE UP (ref 6–8.3)
PROTHROM AB SERPL-ACNC: 11.4 SEC — SIGNIFICANT CHANGE UP (ref 10.5–13.4)
RBC # BLD: 4.38 M/UL — SIGNIFICANT CHANGE UP (ref 3.8–5.2)
RBC # FLD: 12 % — SIGNIFICANT CHANGE UP (ref 10.3–14.5)
SODIUM SERPL-SCNC: 139 MMOL/L — SIGNIFICANT CHANGE UP (ref 135–145)
WBC # BLD: 5.4 K/UL — SIGNIFICANT CHANGE UP (ref 3.8–10.5)
WBC # FLD AUTO: 5.4 K/UL — SIGNIFICANT CHANGE UP (ref 3.8–10.5)

## 2022-12-02 PROCEDURE — G0463: CPT

## 2022-12-02 PROCEDURE — 93010 ELECTROCARDIOGRAM REPORT: CPT

## 2022-12-02 PROCEDURE — 93005 ELECTROCARDIOGRAM TRACING: CPT

## 2022-12-02 NOTE — H&P PST ADULT - FALL HARM RISK - PATIENT NEEDS ASSISTANCE
3/10/2022        Eugene Baker  [address] [facility] [id number]      [id number]          Dear Eugene    Numerous attempts have been made to contact you via the telephone regarding your recent test(s) performed at the clinic.    At your earliest convenience, please contact the clinic at the telephone number listed below, so that this information may be communicated to you.    Thank you,       Byron Morgan MD  [id number] [place] [address]  Telephone: [phone]   
No assistance needed

## 2022-12-02 NOTE — H&P PST ADULT - FALL HARM RISK - UNIVERSAL INTERVENTIONS
Bed in lowest position, wheels locked, appropriate side rails in place/Call bell, personal items and telephone in reach/Instruct patient to call for assistance before getting out of bed or chair/Non-slip footwear when patient is out of bed/[place] to call system/Physically safe environment - no spills, clutter or unnecessary equipment/Purposeful Proactive Rounding/Room/bathroom lighting operational, light cord in reach

## 2022-12-02 NOTE — H&P PST ADULT - MUSCULOSKELETAL
details… left knee/no joint swelling/no joint erythema/no joint warmth/no calf tenderness/decreased ROM/decreased ROM due to pain/strength 5/5 bilateral upper extremities/decreased strength

## 2022-12-02 NOTE — H&P PST ADULT - GENITOURINARY
On physical examination of the patient today she has paravertebral muscular spasm specially on the left side but some is also present on the right side there is no tenderness over the vertebral bones  She has no localizing weakness nor any localizing absence of deep tendon reflexes  Impression is that of a muscle spasm in the lower back with secondary  Nerve irritation  Plan is to initiate tapering dose of steroids starting at 30 mg of prednisone decreased by 5 mg every 2 days medication should be taken with food  In addition naproxen 500 mg twice a day taken with food for 10 days in addition Flexeril 5 mg q 8 hours for muscle spasm  She may alternate the use of heat and ice  She can also supplement with Tylenol 1000 mg q 8 hours as necessary  I have advised her to minimize any lifting or caring and rest over the next several days    Should her pain  Worsen or develop any significant weakness in either leg or have issues with bowel or bladder control she knows to contact me immediately will see her in approximately 12 days for follow-up evaluation may consider some physical therapy at that time if symptoms persist  negative

## 2022-12-02 NOTE — H&P PST ADULT - NSICDXPASTSURGICALHX_GEN_ALL_CORE_FT
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

## 2022-12-02 NOTE — H&P PST ADULT - PROBLEM SELECTOR PLAN 1
left TKR. medical clearance requested. patient had a full hematology work up in 2021 prior to face lift and refuses to see the hematologist again. She states that hematologist told her that she did not need any further ork up. ERAS and surgical wash instructions reviewed and verbalized understanding. instructed to stop ibuprofen 1 week prior to surgery. covid PCR appt. confirmed for 12/16/22 at 1330.

## 2022-12-02 NOTE — H&P PST ADULT - NSICDXFAMILYHX_GEN_ALL_CORE_FT
FAMILY HISTORY:  Father  Still living? No  Family history of congestive heart failure, Age at diagnosis: Age Unknown  Family history of factor V Leiden mutation, Age at diagnosis: Age Unknown  Family history of osteoarthritis, Age at diagnosis: Age Unknown  Family hx of prostate cancer, Age at diagnosis: Age Unknown    Mother  Still living? No  Family history of bladder cancer, Age at diagnosis: Age Unknown  Family history of heart attack, Age at diagnosis: Age Unknown    Sibling  Still living? Yes, Estimated age: 61-70  Family history of factor V Leiden mutation, Age at diagnosis: Age Unknown  Family history of osteoarthritis, Age at diagnosis: Age Unknown

## 2022-12-02 NOTE — H&P PST ADULT - HISTORY OF PRESENT ILLNESS
71 yo female presents s/p right TKR in 2019 with c/o left knee "achy" discomfort, instability and decreased ROM. She received 1 cortisone injection which  did not change her symptoms. She takes ibuprofen 600 mg daily to keep the pain at that "achy" feeling. Otherwise pain increases to 7-8/10 73 yo female presents s/p right TKR in 2020 with c/o left knee "achy" discomfort, instability and decreased ROM. She received 1 cortisone injection which  did not change her symptoms. She takes ibuprofen 600 mg daily to keep the pain at that "achy" feeling. Otherwise pain increases to 7-8/10

## 2022-12-02 NOTE — H&P PST ADULT - NSICDXPASTMEDICALHX_GEN_ALL_CORE_FT
PAST MEDICAL HISTORY:  COVID-19 vaccine series completed bivalent 9/26/22    Factor V Leiden heterozygous and asymptomatic, 2021 had workup and there is no problem, no history of any problems    Gastroesophageal reflux disease with esophagitis 2001    History of low back pain with spasm after right knee replacement    History of osteoarthritis     Hyperlipidemia     Insomnia difficulty staying asleep    Jejunitis 2021    Metallosis history of 2016    Mild obstructive sleep apnea does not use CPAP    White coat syndrome with hypertension

## 2022-12-03 LAB
MRSA PCR RESULT.: SIGNIFICANT CHANGE UP
S AUREUS DNA NOSE QL NAA+PROBE: SIGNIFICANT CHANGE UP

## 2022-12-16 ENCOUNTER — OUTPATIENT (OUTPATIENT)
Dept: OUTPATIENT SERVICES | Facility: HOSPITAL | Age: 72
LOS: 1 days | End: 2022-12-16

## 2022-12-16 DIAGNOSIS — Z98.890 OTHER SPECIFIED POSTPROCEDURAL STATES: Chronic | ICD-10-CM

## 2022-12-16 DIAGNOSIS — Z20.828 CONTACT WITH AND (SUSPECTED) EXPOSURE TO OTHER VIRAL COMMUNICABLE DISEASES: ICD-10-CM

## 2022-12-16 DIAGNOSIS — N84.0 POLYP OF CORPUS UTERI: Chronic | ICD-10-CM

## 2022-12-16 DIAGNOSIS — Z96.651 PRESENCE OF RIGHT ARTIFICIAL KNEE JOINT: Chronic | ICD-10-CM

## 2022-12-16 DIAGNOSIS — Z98.89 OTHER SPECIFIED POSTPROCEDURAL STATES: Chronic | ICD-10-CM

## 2022-12-16 DIAGNOSIS — Z96.641 PRESENCE OF RIGHT ARTIFICIAL HIP JOINT: Chronic | ICD-10-CM

## 2022-12-16 LAB — SARS-COV-2 RNA SPEC QL NAA+PROBE: SIGNIFICANT CHANGE UP

## 2022-12-19 ENCOUNTER — INPATIENT (INPATIENT)
Facility: HOSPITAL | Age: 72
LOS: 1 days | Discharge: HOME | DRG: 470 | End: 2022-12-21
Attending: ORTHOPAEDIC SURGERY | Admitting: ORTHOPAEDIC SURGERY
Payer: MEDICARE

## 2022-12-19 ENCOUNTER — TRANSCRIPTION ENCOUNTER (OUTPATIENT)
Age: 72
End: 2022-12-19

## 2022-12-19 ENCOUNTER — APPOINTMENT (OUTPATIENT)
Dept: ORTHOPEDIC SURGERY | Facility: HOSPITAL | Age: 72
End: 2022-12-19

## 2022-12-19 VITALS
WEIGHT: 163.14 LBS | RESPIRATION RATE: 16 BRPM | TEMPERATURE: 98 F | HEIGHT: 68 IN | HEART RATE: 88 BPM | DIASTOLIC BLOOD PRESSURE: 72 MMHG | OXYGEN SATURATION: 99 % | SYSTOLIC BLOOD PRESSURE: 98 MMHG

## 2022-12-19 DIAGNOSIS — Z98.890 OTHER SPECIFIED POSTPROCEDURAL STATES: Chronic | ICD-10-CM

## 2022-12-19 DIAGNOSIS — Z98.89 OTHER SPECIFIED POSTPROCEDURAL STATES: Chronic | ICD-10-CM

## 2022-12-19 DIAGNOSIS — M17.12 UNILATERAL PRIMARY OSTEOARTHRITIS, LEFT KNEE: ICD-10-CM

## 2022-12-19 DIAGNOSIS — N84.0 POLYP OF CORPUS UTERI: Chronic | ICD-10-CM

## 2022-12-19 DIAGNOSIS — Z96.651 PRESENCE OF RIGHT ARTIFICIAL KNEE JOINT: Chronic | ICD-10-CM

## 2022-12-19 DIAGNOSIS — Z96.641 PRESENCE OF RIGHT ARTIFICIAL HIP JOINT: Chronic | ICD-10-CM

## 2022-12-19 LAB
ANION GAP SERPL CALC-SCNC: 6 MMOL/L — SIGNIFICANT CHANGE UP (ref 5–17)
BUN SERPL-MCNC: 15 MG/DL — SIGNIFICANT CHANGE UP (ref 7–23)
CALCIUM SERPL-MCNC: 8.7 MG/DL — SIGNIFICANT CHANGE UP (ref 8.4–10.5)
CHLORIDE SERPL-SCNC: 106 MMOL/L — SIGNIFICANT CHANGE UP (ref 96–108)
CO2 SERPL-SCNC: 27 MMOL/L — SIGNIFICANT CHANGE UP (ref 22–31)
CREAT SERPL-MCNC: 0.85 MG/DL — SIGNIFICANT CHANGE UP (ref 0.5–1.3)
EGFR: 72 ML/MIN/1.73M2 — SIGNIFICANT CHANGE UP
GLUCOSE SERPL-MCNC: 158 MG/DL — HIGH (ref 70–99)
HCT VFR BLD CALC: 37.8 % — SIGNIFICANT CHANGE UP (ref 34.5–45)
HGB BLD-MCNC: 12.5 G/DL — SIGNIFICANT CHANGE UP (ref 11.5–15.5)
MCHC RBC-ENTMCNC: 32.1 PG — SIGNIFICANT CHANGE UP (ref 27–34)
MCHC RBC-ENTMCNC: 33.1 GM/DL — SIGNIFICANT CHANGE UP (ref 32–36)
MCV RBC AUTO: 96.9 FL — SIGNIFICANT CHANGE UP (ref 80–100)
NRBC # BLD: 0 /100 WBCS — SIGNIFICANT CHANGE UP (ref 0–0)
PLATELET # BLD AUTO: 189 K/UL — SIGNIFICANT CHANGE UP (ref 150–400)
POTASSIUM SERPL-MCNC: 4.1 MMOL/L — SIGNIFICANT CHANGE UP (ref 3.5–5.3)
POTASSIUM SERPL-SCNC: 4.1 MMOL/L — SIGNIFICANT CHANGE UP (ref 3.5–5.3)
RBC # BLD: 3.9 M/UL — SIGNIFICANT CHANGE UP (ref 3.8–5.2)
RBC # FLD: 11.9 % — SIGNIFICANT CHANGE UP (ref 10.3–14.5)
SODIUM SERPL-SCNC: 139 MMOL/L — SIGNIFICANT CHANGE UP (ref 135–145)
WBC # BLD: 9.45 K/UL — SIGNIFICANT CHANGE UP (ref 3.8–10.5)
WBC # FLD AUTO: 9.45 K/UL — SIGNIFICANT CHANGE UP (ref 3.8–10.5)

## 2022-12-19 PROCEDURE — 73560 X-RAY EXAM OF KNEE 1 OR 2: CPT | Mod: 26,LT

## 2022-12-19 PROCEDURE — 99223 1ST HOSP IP/OBS HIGH 75: CPT

## 2022-12-19 PROCEDURE — 27447 TOTAL KNEE ARTHROPLASTY: CPT | Mod: AS

## 2022-12-19 PROCEDURE — 27447 TOTAL KNEE ARTHROPLASTY: CPT | Mod: LT

## 2022-12-19 DEVICE — COMP FEM NON POROUS SZ 6 LT: Type: IMPLANTABLE DEVICE | Site: LEFT | Status: FUNCTIONAL

## 2022-12-19 DEVICE — IMPLANTABLE DEVICE: Type: IMPLANTABLE DEVICE | Site: LEFT | Status: FUNCTIONAL

## 2022-12-19 DEVICE — INSERT TIB NONPOROUS UNIV SZ 5 LT: Type: IMPLANTABLE DEVICE | Site: LEFT | Status: FUNCTIONAL

## 2022-12-19 DEVICE — BONE WAX 2.5GM: Type: IMPLANTABLE DEVICE | Site: LEFT | Status: FUNCTIONAL

## 2022-12-19 DEVICE — COMP PATELLA TRI-PEG E-PLUS POLY 8X32MM: Type: IMPLANTABLE DEVICE | Site: LEFT | Status: FUNCTIONAL

## 2022-12-19 RX ORDER — TRAMADOL HYDROCHLORIDE 50 MG/1
100 TABLET ORAL EVERY 4 HOURS
Refills: 0 | Status: DISCONTINUED | OUTPATIENT
Start: 2022-12-19 | End: 2022-12-20

## 2022-12-19 RX ORDER — APIXABAN 2.5 MG/1
1 TABLET, FILM COATED ORAL
Qty: 28 | Refills: 0
Start: 2022-12-19 | End: 2023-01-01

## 2022-12-19 RX ORDER — SODIUM CHLORIDE 9 MG/ML
1000 INJECTION, SOLUTION INTRAVENOUS
Refills: 0 | Status: DISCONTINUED | OUTPATIENT
Start: 2022-12-19 | End: 2022-12-19

## 2022-12-19 RX ORDER — SODIUM CHLORIDE 9 MG/ML
500 INJECTION INTRAMUSCULAR; INTRAVENOUS; SUBCUTANEOUS ONCE
Refills: 0 | Status: COMPLETED | OUTPATIENT
Start: 2022-12-19 | End: 2022-12-19

## 2022-12-19 RX ORDER — ATORVASTATIN CALCIUM 80 MG/1
10 TABLET, FILM COATED ORAL AT BEDTIME
Refills: 0 | Status: DISCONTINUED | OUTPATIENT
Start: 2022-12-19 | End: 2022-12-21

## 2022-12-19 RX ORDER — TRANEXAMIC ACID 100 MG/ML
1000 INJECTION, SOLUTION INTRAVENOUS ONCE
Refills: 0 | Status: COMPLETED | OUTPATIENT
Start: 2022-12-19 | End: 2022-12-19

## 2022-12-19 RX ORDER — ACETAMINOPHEN 500 MG
1000 TABLET ORAL ONCE
Refills: 0 | Status: COMPLETED | OUTPATIENT
Start: 2022-12-19 | End: 2022-12-19

## 2022-12-19 RX ORDER — CEFAZOLIN SODIUM 1 G
2000 VIAL (EA) INJECTION ONCE
Refills: 0 | Status: COMPLETED | OUTPATIENT
Start: 2022-12-19 | End: 2022-12-19

## 2022-12-19 RX ORDER — SENNA PLUS 8.6 MG/1
2 TABLET ORAL AT BEDTIME
Refills: 0 | Status: DISCONTINUED | OUTPATIENT
Start: 2022-12-19 | End: 2022-12-21

## 2022-12-19 RX ORDER — SODIUM CHLORIDE 9 MG/ML
1000 INJECTION, SOLUTION INTRAVENOUS
Refills: 0 | Status: DISCONTINUED | OUTPATIENT
Start: 2022-12-19 | End: 2022-12-21

## 2022-12-19 RX ORDER — ONDANSETRON 8 MG/1
4 TABLET, FILM COATED ORAL ONCE
Refills: 0 | Status: DISCONTINUED | OUTPATIENT
Start: 2022-12-19 | End: 2022-12-19

## 2022-12-19 RX ORDER — APREPITANT 80 MG/1
40 CAPSULE ORAL ONCE
Refills: 0 | Status: COMPLETED | OUTPATIENT
Start: 2022-12-19 | End: 2022-12-19

## 2022-12-19 RX ORDER — TRAMADOL HYDROCHLORIDE 50 MG/1
50 TABLET ORAL EVERY 4 HOURS
Refills: 0 | Status: DISCONTINUED | OUTPATIENT
Start: 2022-12-19 | End: 2022-12-20

## 2022-12-19 RX ORDER — ACETAMINOPHEN 500 MG
2 TABLET ORAL
Qty: 0 | Refills: 0 | DISCHARGE
Start: 2022-12-19 | End: 2023-01-02

## 2022-12-19 RX ORDER — CEFAZOLIN SODIUM 1 G
2000 VIAL (EA) INJECTION EVERY 8 HOURS
Refills: 0 | Status: COMPLETED | OUTPATIENT
Start: 2022-12-19 | End: 2022-12-20

## 2022-12-19 RX ORDER — MAGNESIUM HYDROXIDE 400 MG/1
30 TABLET, CHEWABLE ORAL DAILY
Refills: 0 | Status: DISCONTINUED | OUTPATIENT
Start: 2022-12-19 | End: 2022-12-21

## 2022-12-19 RX ORDER — METHOCARBAMOL 500 MG/1
500 TABLET, FILM COATED ORAL EVERY 8 HOURS
Refills: 0 | Status: DISCONTINUED | OUTPATIENT
Start: 2022-12-19 | End: 2022-12-21

## 2022-12-19 RX ORDER — HYDROMORPHONE HYDROCHLORIDE 2 MG/ML
1 INJECTION INTRAMUSCULAR; INTRAVENOUS; SUBCUTANEOUS
Refills: 0 | Status: DISCONTINUED | OUTPATIENT
Start: 2022-12-19 | End: 2022-12-19

## 2022-12-19 RX ORDER — ONDANSETRON 8 MG/1
4 TABLET, FILM COATED ORAL EVERY 6 HOURS
Refills: 0 | Status: DISCONTINUED | OUTPATIENT
Start: 2022-12-19 | End: 2022-12-21

## 2022-12-19 RX ORDER — PANTOPRAZOLE SODIUM 20 MG/1
40 TABLET, DELAYED RELEASE ORAL
Refills: 0 | Status: DISCONTINUED | OUTPATIENT
Start: 2022-12-19 | End: 2022-12-21

## 2022-12-19 RX ORDER — CELECOXIB 200 MG/1
200 CAPSULE ORAL EVERY 12 HOURS
Refills: 0 | Status: DISCONTINUED | OUTPATIENT
Start: 2022-12-19 | End: 2022-12-21

## 2022-12-19 RX ORDER — ASPIRIN/CALCIUM CARB/MAGNESIUM 324 MG
1 TABLET ORAL
Qty: 28 | Refills: 0
Start: 2022-12-19 | End: 2023-01-01

## 2022-12-19 RX ORDER — POLYETHYLENE GLYCOL 3350 17 G/17G
17 POWDER, FOR SOLUTION ORAL AT BEDTIME
Refills: 0 | Status: DISCONTINUED | OUTPATIENT
Start: 2022-12-19 | End: 2022-12-21

## 2022-12-19 RX ORDER — CHLORHEXIDINE GLUCONATE 213 G/1000ML
1 SOLUTION TOPICAL ONCE
Refills: 0 | Status: COMPLETED | OUTPATIENT
Start: 2022-12-19 | End: 2022-12-19

## 2022-12-19 RX ORDER — DEXAMETHASONE 0.5 MG/5ML
8 ELIXIR ORAL ONCE
Refills: 0 | Status: COMPLETED | OUTPATIENT
Start: 2022-12-20 | End: 2022-12-20

## 2022-12-19 RX ORDER — OMEPRAZOLE 10 MG/1
1 CAPSULE, DELAYED RELEASE ORAL
Qty: 28 | Refills: 0
Start: 2022-12-19 | End: 2023-01-15

## 2022-12-19 RX ORDER — HYDROMORPHONE HYDROCHLORIDE 2 MG/ML
0.5 INJECTION INTRAMUSCULAR; INTRAVENOUS; SUBCUTANEOUS
Refills: 0 | Status: DISCONTINUED | OUTPATIENT
Start: 2022-12-19 | End: 2022-12-19

## 2022-12-19 RX ORDER — ACETAMINOPHEN 500 MG
1000 TABLET ORAL EVERY 8 HOURS
Refills: 0 | Status: DISCONTINUED | OUTPATIENT
Start: 2022-12-19 | End: 2022-12-21

## 2022-12-19 RX ORDER — APIXABAN 2.5 MG/1
2.5 TABLET, FILM COATED ORAL
Refills: 0 | Status: COMPLETED | OUTPATIENT
Start: 2022-12-20 | End: 2022-12-20

## 2022-12-19 RX ORDER — APIXABAN 2.5 MG/1
2.5 TABLET, FILM COATED ORAL EVERY 12 HOURS
Refills: 0 | Status: DISCONTINUED | OUTPATIENT
Start: 2022-12-21 | End: 2022-12-21

## 2022-12-19 RX ORDER — CELECOXIB 200 MG/1
1 CAPSULE ORAL
Qty: 56 | Refills: 0
Start: 2022-12-19 | End: 2023-01-15

## 2022-12-19 RX ADMIN — Medication 1000 MILLIGRAM(S): at 18:11

## 2022-12-19 RX ADMIN — Medication 1000 MILLIGRAM(S): at 22:02

## 2022-12-19 RX ADMIN — Medication 1000 MILLIGRAM(S): at 22:50

## 2022-12-19 RX ADMIN — SODIUM CHLORIDE 500 MILLILITER(S): 9 INJECTION INTRAMUSCULAR; INTRAVENOUS; SUBCUTANEOUS at 17:00

## 2022-12-19 RX ADMIN — CELECOXIB 200 MILLIGRAM(S): 200 CAPSULE ORAL at 21:05

## 2022-12-19 RX ADMIN — TRAMADOL HYDROCHLORIDE 50 MILLIGRAM(S): 50 TABLET ORAL at 17:51

## 2022-12-19 RX ADMIN — ATORVASTATIN CALCIUM 10 MILLIGRAM(S): 80 TABLET, FILM COATED ORAL at 22:02

## 2022-12-19 RX ADMIN — TRAMADOL HYDROCHLORIDE 50 MILLIGRAM(S): 50 TABLET ORAL at 22:03

## 2022-12-19 RX ADMIN — TRAMADOL HYDROCHLORIDE 50 MILLIGRAM(S): 50 TABLET ORAL at 17:02

## 2022-12-19 RX ADMIN — SODIUM CHLORIDE 75 MILLILITER(S): 9 INJECTION, SOLUTION INTRAVENOUS at 12:24

## 2022-12-19 RX ADMIN — SENNA PLUS 2 TABLET(S): 8.6 TABLET ORAL at 22:02

## 2022-12-19 RX ADMIN — CHLORHEXIDINE GLUCONATE 1 APPLICATION(S): 213 SOLUTION TOPICAL at 07:56

## 2022-12-19 RX ADMIN — POLYETHYLENE GLYCOL 3350 17 GRAM(S): 17 POWDER, FOR SOLUTION ORAL at 22:02

## 2022-12-19 RX ADMIN — TRAMADOL HYDROCHLORIDE 50 MILLIGRAM(S): 50 TABLET ORAL at 22:50

## 2022-12-19 RX ADMIN — CELECOXIB 200 MILLIGRAM(S): 200 CAPSULE ORAL at 21:41

## 2022-12-19 RX ADMIN — APREPITANT 40 MILLIGRAM(S): 80 CAPSULE ORAL at 07:56

## 2022-12-19 RX ADMIN — Medication 400 MILLIGRAM(S): at 17:00

## 2022-12-19 RX ADMIN — SODIUM CHLORIDE 500 MILLILITER(S): 9 INJECTION INTRAMUSCULAR; INTRAVENOUS; SUBCUTANEOUS at 12:24

## 2022-12-19 RX ADMIN — Medication 100 MILLIGRAM(S): at 17:00

## 2022-12-19 NOTE — DISCHARGE NOTE NURSING/CASE MANAGEMENT/SOCIAL WORK - NSDCPEFALRISK_GEN_ALL_CORE
For information on Fall & Injury Prevention, visit: [email or web address] OR  [email or web address] OR  https://www.cdc.gov/steadi/patient.html

## 2022-12-19 NOTE — DISCHARGE NOTE PROVIDER - NSDCFUADDINST_GEN_ALL_CORE_FT
For Constipation :   • Increase your water intake. Drink at least 8 glasses of water daily.  • Try adding fiber to your diet by eating fruits, vegetables and foods that are rich in grains.  • If you do experience constipation, you may take an over-the-counter stool softener/laxative such as Rizwana Colace, Senekot or Milk of Magnesia.  - Call your doctor if you experience:  • An increase in pain not controlled by pain medication or change in activity or  position.  • Temperature greater than 101° F.  • Redness, increased swelling or foul smelling drainage from or around the  incision.  • Numbness, tingling or a change in color or temperature of the operative leg.  • Call your doctor immediately if you experience chest pain, shortness of breath or calf pain.

## 2022-12-19 NOTE — DISCHARGE NOTE PROVIDER - NSDCMRMEDTOKEN_GEN_ALL_CORE_FT
biotin: 2 gummies orally once a day  ibuprofen 600 mg oral tablet: 1 tab(s) orally once a day  Lipitor 10 mg oral tablet: 1 tab(s) orally once a day (at bedtime)  Vitamin D3 50 mcg (2000 intl units) oral tablet: 1 tab(s) orally once a day   acetaminophen 500 mg oral tablet: 2 tab(s) orally every 8 hours  Aspirin Enteric Coated 81 mg oral delayed release tablet: Start1 tab orally every 12 hours for 14 days, once Eliquis is completed. Take aspirin 2 hours before Celebrex.  biotin: 2 gummies orally once a day  celecoxib 200 mg oral capsule: 1 cap orally every 12 hours.   Dilaudid 2 mg oral tablet: 1-2 tab(s) orally every 4 hours, As Needed -for moderate to severe pain MDD:6  Reference #: [id number]  Eliquis 2.5 mg oral tablet: 1 tab orally every 12 hours for 14 days total post-op. Change to Aspirin 81mg every 12 hours for 14 days, once Eliquis is completed.   Lipitor 10 mg oral tablet: 1 tab(s) orally once a day (at bedtime)  Narcan 4 mg/0.1 mL nasal spray: Instill one spray intranasally once as needed for overdose. May repeat 2-3 minutes later in the alternate nostril, if needed while awaiting EMS.   omeprazole 20 mg oral delayed release capsule: 1 cap(s) orally once a day   Valium 5 mg oral tablet: 1 tab(s) orally every 8 hours, As Needed -for muscle spasm MDD:3  Reference #: [id number]  Vitamin D3 50 mcg (2000 intl units) oral tablet: 1 tab(s) orally once a day   acetaminophen 500 mg oral tablet: 2 tab(s) orally every 8 hours  Aspirin Enteric Coated 81 mg oral delayed release tablet: Start1 tab orally every 12 hours for 14 days, once Eliquis is completed. Take aspirin 2 hours before Celebrex.  biotin: 2 gummies orally once a day  celecoxib 200 mg oral capsule: 1 cap orally every 12 hours.   Eliquis 2.5 mg oral tablet: 1 tab orally every 12 hours for 14 days total post-op. Change to Aspirin 81mg every 12 hours for 14 days, once Eliquis is completed.   Lipitor 10 mg oral tablet: 1 tab(s) orally once a day (at bedtime)  Narcan 4 mg/0.1 mL nasal spray: Instill one spray intranasally once as needed for overdose. May repeat 2-3 minutes later in the alternate nostril, if needed while awaiting EMS.   omeprazole 20 mg oral delayed release capsule: 1 cap(s) orally once a day   polyethylene glycol 3350 oral powder for reconstitution: 17 gram(s) orally once a day (at bedtime)  senna leaf extract oral tablet: 2 tab(s) orally once a day (at bedtime)  traMADol 50 mg oral tablet: 1 tab(s) orally every 4 hours, As Needed -for severe pain MDD:6     Reference #: [id number]  Valium 5 mg oral tablet: 1 tab(s) orally every 8 hours, As Needed -for muscle spasm MDD:3  Reference #: [id number]  Vitamin D3 50 mcg (2000 intl units) oral tablet: 1 tab(s) orally once a day

## 2022-12-19 NOTE — OCCUPATIONAL THERAPY INITIAL EVALUATION ADULT - ADDITIONAL COMMENTS
Pt lives in a pvt home alone. 4 GRZEGORZ +railing 6&7 steps +railing  . Pt owns a stall Pt owns 2 RW, commode   Pt reports using RW the week  for sx due to right hip injury Pt lives in a pvt home alone. 7 [address]ZEG[address]Z +railing flight +railing to bedroom bathroom (with tub+ grab bars)  . stall shower on 1st floor.  Pt owns 2 RW, commode   Pt reports using RW the week  for sx due to right hip injury

## 2022-12-19 NOTE — DISCHARGE NOTE PROVIDER - NSDCCPCAREPLAN_GEN_ALL_CORE_FT
PRINCIPAL DISCHARGE DIAGNOSIS  Diagnosis: Primary osteoarthritis of left knee  Assessment and Plan of Treatment: Physical Therapy/Occupational Therapy for: ambulation, transfers, stairs, ADL's (activities of daily living), and range of motion exercises  -Activity  • Weight Bearing as tolerated with rolling walker.  • Take short, frequent walks increasing the distance that you walk each day as tolerated.  • Change your position every hour to decrease pain and stiffness.  • Continue the exercises taught to you by your physical therapist.  • No driving until cleared by the doctor.  • No tub baths, hot tubs, or swimming pools until instructed by your doctor.  • Do not squat down on the floor.  • Do not kneel or twist your knee.  • Range of Motion Goals: Flexion= 120 degrees, Extension = 0 degrees  Ice & Elevate leg to decrease pain/swelling  Keep incision area clean and dry.  You may shower post operative day 5 if no drainage present.  Prineo removal in 2 weeks in Surgeon's office  You have prineo tape over your incision, this is a waterproof seal.  You may shower if there is no drainage present from wound.  Allow warm/soapy water to run over prineo tape, dab dry with a clean towel after shower.  No Salves/ointments over prineo tape, tape will be removed at surgeons office at post operative visit   Apply Cryocuff to operative knee for 20 minutes at a time several times a day with at least a one hour break inbetween sessions.

## 2022-12-19 NOTE — OCCUPATIONAL THERAPY INITIAL EVALUATION ADULT - PRECAUTIONS/LIMITATIONS, REHAB EVAL
Patient states for the past 2 weeks he has had L sided head pain, states his L eye is crusted over in the morning and states his R side jolts sometimes cryocuff every hour for 20 minutes/fall precautions/surgical precautions

## 2022-12-19 NOTE — PHYSICAL THERAPY INITIAL EVALUATION ADULT - ADDITIONAL COMMENTS
pvt home with 7 GRZEGORZ w/ HR side/rear entrance 13 steps with HR to bedroom. Pt drives. 14 steps to basement(laundry). Pt has 2 RW, forearm crutches, raised toilet seats, tub & stall. Pt is planning to hire a [facility]HA ti ast with household activities/laundry post d/c

## 2022-12-19 NOTE — DISCHARGE NOTE NURSING/CASE MANAGEMENT/SOCIAL WORK - PATIENT PORTAL LINK FT
You can access the FollowMyHealth Patient Portal offered by [facility] by registering at the following website: [email or web address] By joining [email or web address] FollowMyHealth portal, you will also be able to view your health information using other applications (apps) compatible with our system.

## 2022-12-19 NOTE — DISCHARGE NOTE NURSING/CASE MANAGEMENT/SOCIAL WORK - NSSCNAMETXT_GEN_ALL_CORE
[facility] (formerly [facility])   [address] [place] [address]

## 2022-12-19 NOTE — PHYSICAL THERAPY INITIAL EVALUATION ADULT - PERTINENT HX OF CURRENT PROBLEM, REHAB EVAL
71 yo female presents s/p right TKR in 2020 with c/o left knee "achy" discomfort, instability and decreased ROM. She received 1 cortisone injection which  did not change her symptoms. She takes ibuprofen 600 mg daily to keep the pain at that "achy" feeling. Otherwise pain increases to 7-8/10

## 2022-12-19 NOTE — CONSULT NOTE ADULT - ASSESSMENT
72F GERD, HLD and OA admitted for aftercare following Left TKR    S/P Left TKR  POD 0    Continue Bowel and pain control regimen;    Incentive Spirometer for lung expansion;   Monitor Hgb and follow up electrolytes.      HLD  Statin    GERD  Protonix    History of Factor V Leiden  Worked up. Heterozygous and cleared    Diet  Regular    DVT Prophylaxis   Eliquis BID    Disposition  Discharge planning pending hospital course

## 2022-12-19 NOTE — DISCHARGE NOTE PROVIDER - HOSPITAL COURSE
This patient was admitted to [facility] with a history of severe degenerative joint disease of the left knee.  Patient went to Pre-Surgical Testing at [facility] and was medically cleared to undergo elective procedure.  No operative or susanna-operative complications arose during patients hospital course.  Patient received antibiotic according to SCIP guidelines for infection prevention.  Eliquis was given for DVT prophylaxis.  Anesthesia, Medical Hospitalist, Physical Therapy and Occupational Therapy were consulted. Patient is stable for discharge with a good prognosis.  Appropriate discharge instructions and medications are provided in this document. This patient was admitted to [facility] with a history of severe degenerative joint disease of the left knee.  Patient went to Pre-Surgical Testing at [facility] and was medically cleared to undergo a left total knee replacement by Dr. Sarmiento on 12/19/22.  No operative or susanna-operative complications arose during patients hospital course.  Patient received antibiotic according to SCIP guidelines for infection prevention.  Eliquis was given for DVT prophylaxis.  Anesthesia, Medical Hospitalist, Physical Therapy and Occupational Therapy were consulted. Patient is stable for discharge with a good prognosis.  Appropriate discharge instructions and medications are provided in this document.

## 2022-12-19 NOTE — PHYSICAL THERAPY INITIAL EVALUATION ADULT - GAIT DEVIATIONS NOTED, PT EVAL
decreased marco antonio/decreased stride length/increased stride width/decreased weight-shifting ability

## 2022-12-19 NOTE — DISCHARGE NOTE PROVIDER - NSDCFUSCHEDAPPT_GEN_ALL_CORE_FT
Zhanna Motta  [facility]  ORTHOSURG [address] [place] [address]  Scheduled Appointment: 01/05/2023    Leyla Sarmiento  [facility]  ORTHOSUR[address]  Scheduled Appointment: 02/14/2023

## 2022-12-19 NOTE — DISCHARGE NOTE PROVIDER - CARE PROVIDER_API CALL
Leyla Sarmiento)  Orthopedics  [address], [address]  [place] [address]  Phone: [phone]  Fax: [phone]  Scheduled Appointment: 01/05/2023 09:00 AM

## 2022-12-19 NOTE — CONSULT NOTE ADULT - SUBJECTIVE AND OBJECTIVE BOX
HPI: 72F GERD, HLD and OA has been combatting pain in left knee for several years which has progressively worsened.  Patient has tried multiple options for pain relief including OTC medication and as well as PT with minimal relief and has undergone elective replacement of left knee successfully.  Patient is currently resting in bed comfortable with good pain control.     REVIEW OF SYSTEMS:  CONSTITUTIONAL: No fever, weight loss, or fatigue  EYES: No eye pain, visual disturbances, or discharge  ENMT:  No difficulty hearing, tinnitus, vertigo; No sinus or throat pain  NECK: No pain or stiffness  RESPIRATORY: No cough, wheezing, chills or hemoptysis; No shortness of breath  CARDIOVASCULAR: No chest pain, palpitations, dizziness, or leg swelling  GASTROINTESTINAL: No abdominal or epigastric pain. No nausea, vomiting, or hematemesis; No diarrhea or constipation. No melena or hematochezia.  GENITOURINARY: No dysuria, frequency, hematuria, or incontinence  NEUROLOGICAL: No headaches, memory loss, loss of strength, numbness, or tremors  MUSCULOSKELETAL: No muscle or back pain      PAST MEDICAL & SURGICAL HISTORY:  Factor V Leiden  heterozygous and asymptomatic, 2021 had workup and there is no problem, no history of any problems      History of osteoarthritis      Gastroesophageal reflux disease with esophagitis  2001      Insomnia  difficulty staying asleep      Jejunitis  2021      White coat syndrome with hypertension      Hyperlipidemia      Mild obstructive sleep apnea  does not use CPAP      COVID-19 vaccine series completed  bivalent 9/26/22      Metallosis  history of 2016      History of low back pain  with spasm after right knee replacement      S/P hip replacement, right  2003, 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 1000 milliLiter(s) (75 mL/Hr) IV Continuous <Continuous>  sodium chloride 0.9% Bolus 500 milliLiter(s) IV Bolus once    MEDICATIONS  (PRN):  HYDROmorphone  Injectable 0.5 milliGRAM(s) IV Push every 10 minutes PRN Moderate Pain (4 - 6)  HYDROmorphone  Injectable 1 milliGRAM(s) IV Push every 10 minutes PRN Severe Pain (7 - 10)  ondansetron Injectable 4 milliGRAM(s) IV Push once PRN Nausea and/or Vomiting      FAMILY HISTORY:  Family history of factor V Leiden mutation (Father, Sibling)    Family hx of prostate cancer (Father)    Family history of osteoarthritis (Father, Sibling)    Family history of congestive heart failure (Father)    Family history of bladder cancer (Mother)    Family history of heart attack (Mother)        Vital Signs Last 24 Hrs  T(C): 36.7 (19 Dec 2022 11:30), Max: 36.7 (19 Dec 2022 07:28)  T(F): 98.1 (19 Dec 2022 11:30), Max: 98.1 (19 Dec 2022 11:30)  HR: 94 (19 Dec 2022 12:00) (84 - 97)  BP: 99/39 (19 Dec 2022 12:00) (98/72 - 121/56)  BP(mean): --  RR: 15 (19 Dec 2022 12:00) (13 - 18)  SpO2: 98% (19 Dec 2022 12:00) (98% - 99%)    Parameters below as of 19 Dec 2022 12:00  Patient On (Oxygen Delivery Method): room air        PHYSICAL EXAM:    GENERAL: NAD, well-developed  HEAD:  Atraumatic, Normocephalic  EYES: EOMI, PERRLA, conjunctiva and sclera clear  ENMT: No tonsillar erythema, exudates, or enlargement; Moist mucous membranes, Good dentition, No lesions  NECK: Supple, No JVD, Normal thyroid  NERVOUS SYSTEM:  Alert & Oriented X3, Good concentration;  CHEST/LUNG: Clear to auscultation bilaterally; No rales, rhonchi, wheezing, or rubs  HEART: Regular rate and rhythm; No murmurs, rubs, or gallops  ABDOMEN: Soft, Nontender, Nondistended; Bowel sounds present  EXTREMITIES:  2+ Peripheral Pulses, No clubbing, cyanosis, or edema      LABS:              CAPILLARY BLOOD GLUCOSE          RADIOLOGY & ADDITIONAL STUDIES:    EKG:   Personally Reviewed:  [ ] YES     Imaging:   Personally Reviewed:  [ ] YES     Consultant(s) Notes Reviewed:      Care Discussed with Consultants/Other Providers:

## 2022-12-19 NOTE — PATIENT PROFILE ADULT - FALL HARM RISK - UNIVERSAL INTERVENTIONS
Bed in lowest position, wheels locked, appropriate side rails in place/Call bell, personal items and telephone in reach/Instruct patient to call for assistance before getting out of bed or chair/Non-slip footwear when patient is out of bed/[place] to call system/Physically safe environment - no spills, clutter or unnecessary equipment/Purposeful Proactive Rounding/Room/bathroom lighting operational, light cord in reach

## 2022-12-19 NOTE — PHYSICAL THERAPY INITIAL EVALUATION ADULT - RANGE OF MOTION EXAMINATION, REHAB EVAL
left LE not tested due to surg/bilateral upper extremity ROM was WNL (within normal limits)/Right LE ROM was WFL (within functional limits)

## 2022-12-20 LAB
ANION GAP SERPL CALC-SCNC: 5 MMOL/L — SIGNIFICANT CHANGE UP (ref 5–17)
BUN SERPL-MCNC: 16 MG/DL — SIGNIFICANT CHANGE UP (ref 7–23)
CALCIUM SERPL-MCNC: 8.7 MG/DL — SIGNIFICANT CHANGE UP (ref 8.4–10.5)
CHLORIDE SERPL-SCNC: 106 MMOL/L — SIGNIFICANT CHANGE UP (ref 96–108)
CO2 SERPL-SCNC: 29 MMOL/L — SIGNIFICANT CHANGE UP (ref 22–31)
CREAT SERPL-MCNC: 0.93 MG/DL — SIGNIFICANT CHANGE UP (ref 0.5–1.3)
EGFR: 65 ML/MIN/1.73M2 — SIGNIFICANT CHANGE UP
GLUCOSE SERPL-MCNC: 97 MG/DL — SIGNIFICANT CHANGE UP (ref 70–99)
HCT VFR BLD CALC: 34.3 % — LOW (ref 34.5–45)
HGB BLD-MCNC: 11.6 G/DL — SIGNIFICANT CHANGE UP (ref 11.5–15.5)
MCHC RBC-ENTMCNC: 32.9 PG — SIGNIFICANT CHANGE UP (ref 27–34)
MCHC RBC-ENTMCNC: 33.8 GM/DL — SIGNIFICANT CHANGE UP (ref 32–36)
MCV RBC AUTO: 97.2 FL — SIGNIFICANT CHANGE UP (ref 80–100)
NRBC # BLD: 0 /100 WBCS — SIGNIFICANT CHANGE UP (ref 0–0)
PLATELET # BLD AUTO: 154 K/UL — SIGNIFICANT CHANGE UP (ref 150–400)
POTASSIUM SERPL-MCNC: 4 MMOL/L — SIGNIFICANT CHANGE UP (ref 3.5–5.3)
POTASSIUM SERPL-SCNC: 4 MMOL/L — SIGNIFICANT CHANGE UP (ref 3.5–5.3)
RBC # BLD: 3.53 M/UL — LOW (ref 3.8–5.2)
RBC # FLD: 11.9 % — SIGNIFICANT CHANGE UP (ref 10.3–14.5)
SODIUM SERPL-SCNC: 140 MMOL/L — SIGNIFICANT CHANGE UP (ref 135–145)
WBC # BLD: 8.73 K/UL — SIGNIFICANT CHANGE UP (ref 3.8–10.5)
WBC # FLD AUTO: 8.73 K/UL — SIGNIFICANT CHANGE UP (ref 3.8–10.5)

## 2022-12-20 PROCEDURE — 99232 SBSQ HOSP IP/OBS MODERATE 35: CPT

## 2022-12-20 RX ORDER — DIAZEPAM 5 MG
1 TABLET ORAL
Qty: 10 | Refills: 0
Start: 2022-12-20

## 2022-12-20 RX ORDER — HYDROMORPHONE HYDROCHLORIDE 2 MG/ML
2 INJECTION INTRAMUSCULAR; INTRAVENOUS; SUBCUTANEOUS
Refills: 0 | Status: DISCONTINUED | OUTPATIENT
Start: 2022-12-20 | End: 2022-12-21

## 2022-12-20 RX ORDER — HYDROMORPHONE HYDROCHLORIDE 2 MG/ML
1 INJECTION INTRAMUSCULAR; INTRAVENOUS; SUBCUTANEOUS
Qty: 42 | Refills: 0
Start: 2022-12-20 | End: 2022-12-26

## 2022-12-20 RX ORDER — HYDROMORPHONE HYDROCHLORIDE 2 MG/ML
4 INJECTION INTRAMUSCULAR; INTRAVENOUS; SUBCUTANEOUS
Refills: 0 | Status: DISCONTINUED | OUTPATIENT
Start: 2022-12-20 | End: 2022-12-21

## 2022-12-20 RX ORDER — IBUPROFEN 200 MG
1 TABLET ORAL
Qty: 0 | Refills: 0 | DISCHARGE

## 2022-12-20 RX ORDER — NALOXONE HYDROCHLORIDE 4 MG/.1ML
4 SPRAY NASAL
Qty: 1 | Refills: 0
Start: 2022-12-20

## 2022-12-20 RX ORDER — DIAZEPAM 5 MG
5 TABLET ORAL EVERY 8 HOURS
Refills: 0 | Status: DISCONTINUED | OUTPATIENT
Start: 2022-12-20 | End: 2022-12-20

## 2022-12-20 RX ADMIN — HYDROMORPHONE HYDROCHLORIDE 2 MILLIGRAM(S): 2 INJECTION INTRAMUSCULAR; INTRAVENOUS; SUBCUTANEOUS at 23:30

## 2022-12-20 RX ADMIN — HYDROMORPHONE HYDROCHLORIDE 2 MILLIGRAM(S): 2 INJECTION INTRAMUSCULAR; INTRAVENOUS; SUBCUTANEOUS at 20:35

## 2022-12-20 RX ADMIN — Medication 1000 MILLIGRAM(S): at 21:54

## 2022-12-20 RX ADMIN — Medication 1000 MILLIGRAM(S): at 21:49

## 2022-12-20 RX ADMIN — TRAMADOL HYDROCHLORIDE 50 MILLIGRAM(S): 50 TABLET ORAL at 02:55

## 2022-12-20 RX ADMIN — HYDROMORPHONE HYDROCHLORIDE 2 MILLIGRAM(S): 2 INJECTION INTRAMUSCULAR; INTRAVENOUS; SUBCUTANEOUS at 11:38

## 2022-12-20 RX ADMIN — HYDROMORPHONE HYDROCHLORIDE 2 MILLIGRAM(S): 2 INJECTION INTRAMUSCULAR; INTRAVENOUS; SUBCUTANEOUS at 15:05

## 2022-12-20 RX ADMIN — Medication 1000 MILLIGRAM(S): at 06:53

## 2022-12-20 RX ADMIN — TRAMADOL HYDROCHLORIDE 100 MILLIGRAM(S): 50 TABLET ORAL at 06:53

## 2022-12-20 RX ADMIN — Medication 101.6 MILLIGRAM(S): at 06:13

## 2022-12-20 RX ADMIN — APIXABAN 2.5 MILLIGRAM(S): 2.5 TABLET, FILM COATED ORAL at 21:49

## 2022-12-20 RX ADMIN — TRAMADOL HYDROCHLORIDE 50 MILLIGRAM(S): 50 TABLET ORAL at 02:06

## 2022-12-20 RX ADMIN — Medication 1000 MILLIGRAM(S): at 06:13

## 2022-12-20 RX ADMIN — POLYETHYLENE GLYCOL 3350 17 GRAM(S): 17 POWDER, FOR SOLUTION ORAL at 21:53

## 2022-12-20 RX ADMIN — CELECOXIB 200 MILLIGRAM(S): 200 CAPSULE ORAL at 21:49

## 2022-12-20 RX ADMIN — SENNA PLUS 2 TABLET(S): 8.6 TABLET ORAL at 21:50

## 2022-12-20 RX ADMIN — Medication 100 MILLIGRAM(S): at 01:23

## 2022-12-20 RX ADMIN — HYDROMORPHONE HYDROCHLORIDE 2 MILLIGRAM(S): 2 INJECTION INTRAMUSCULAR; INTRAVENOUS; SUBCUTANEOUS at 19:39

## 2022-12-20 RX ADMIN — METHOCARBAMOL 500 MILLIGRAM(S): 500 TABLET, FILM COATED ORAL at 01:25

## 2022-12-20 RX ADMIN — CELECOXIB 200 MILLIGRAM(S): 200 CAPSULE ORAL at 21:55

## 2022-12-20 RX ADMIN — CELECOXIB 200 MILLIGRAM(S): 200 CAPSULE ORAL at 10:39

## 2022-12-20 RX ADMIN — METHOCARBAMOL 500 MILLIGRAM(S): 500 TABLET, FILM COATED ORAL at 10:39

## 2022-12-20 RX ADMIN — TRAMADOL HYDROCHLORIDE 100 MILLIGRAM(S): 50 TABLET ORAL at 06:12

## 2022-12-20 RX ADMIN — ATORVASTATIN CALCIUM 10 MILLIGRAM(S): 80 TABLET, FILM COATED ORAL at 21:49

## 2022-12-20 RX ADMIN — CELECOXIB 200 MILLIGRAM(S): 200 CAPSULE ORAL at 10:36

## 2022-12-20 RX ADMIN — METHOCARBAMOL 500 MILLIGRAM(S): 500 TABLET, FILM COATED ORAL at 17:35

## 2022-12-20 RX ADMIN — APIXABAN 2.5 MILLIGRAM(S): 2.5 TABLET, FILM COATED ORAL at 10:39

## 2022-12-20 RX ADMIN — Medication 1000 MILLIGRAM(S): at 14:16

## 2022-12-20 RX ADMIN — Medication 1000 MILLIGRAM(S): at 14:36

## 2022-12-20 RX ADMIN — HYDROMORPHONE HYDROCHLORIDE 2 MILLIGRAM(S): 2 INJECTION INTRAMUSCULAR; INTRAVENOUS; SUBCUTANEOUS at 22:45

## 2022-12-21 VITALS
DIASTOLIC BLOOD PRESSURE: 77 MMHG | HEART RATE: 66 BPM | TEMPERATURE: 98 F | SYSTOLIC BLOOD PRESSURE: 122 MMHG | RESPIRATION RATE: 18 BRPM | OXYGEN SATURATION: 95 %

## 2022-12-21 PROCEDURE — 99232 SBSQ HOSP IP/OBS MODERATE 35: CPT

## 2022-12-21 RX ORDER — TRAMADOL HYDROCHLORIDE 50 MG/1
1 TABLET ORAL
Qty: 42 | Refills: 0
Start: 2022-12-21 | End: 2022-12-27

## 2022-12-21 RX ORDER — TRAMADOL HYDROCHLORIDE 50 MG/1
100 TABLET ORAL EVERY 4 HOURS
Refills: 0 | Status: DISCONTINUED | OUTPATIENT
Start: 2022-12-21 | End: 2022-12-21

## 2022-12-21 RX ORDER — SENNA PLUS 8.6 MG/1
2 TABLET ORAL
Qty: 0 | Refills: 0 | DISCHARGE
Start: 2022-12-21

## 2022-12-21 RX ORDER — POLYETHYLENE GLYCOL 3350 17 G/17G
17 POWDER, FOR SOLUTION ORAL
Qty: 0 | Refills: 0 | DISCHARGE
Start: 2022-12-21

## 2022-12-21 RX ORDER — TRAMADOL HYDROCHLORIDE 50 MG/1
50 TABLET ORAL EVERY 4 HOURS
Refills: 0 | Status: DISCONTINUED | OUTPATIENT
Start: 2022-12-21 | End: 2022-12-21

## 2022-12-21 RX ADMIN — APIXABAN 2.5 MILLIGRAM(S): 2.5 TABLET, FILM COATED ORAL at 08:40

## 2022-12-21 RX ADMIN — Medication 1000 MILLIGRAM(S): at 05:21

## 2022-12-21 RX ADMIN — TRAMADOL HYDROCHLORIDE 50 MILLIGRAM(S): 50 TABLET ORAL at 09:32

## 2022-12-21 RX ADMIN — METHOCARBAMOL 500 MILLIGRAM(S): 500 TABLET, FILM COATED ORAL at 02:03

## 2022-12-21 RX ADMIN — TRAMADOL HYDROCHLORIDE 50 MILLIGRAM(S): 50 TABLET ORAL at 09:29

## 2022-12-21 RX ADMIN — TRAMADOL HYDROCHLORIDE 50 MILLIGRAM(S): 50 TABLET ORAL at 13:21

## 2022-12-21 RX ADMIN — TRAMADOL HYDROCHLORIDE 50 MILLIGRAM(S): 50 TABLET ORAL at 00:00

## 2022-12-21 RX ADMIN — CELECOXIB 200 MILLIGRAM(S): 200 CAPSULE ORAL at 09:15

## 2022-12-21 RX ADMIN — Medication 1000 MILLIGRAM(S): at 12:58

## 2022-12-21 RX ADMIN — CELECOXIB 200 MILLIGRAM(S): 200 CAPSULE ORAL at 08:39

## 2022-12-21 RX ADMIN — Medication 1000 MILLIGRAM(S): at 05:23

## 2022-12-21 NOTE — PROGRESS NOTE ADULT - ASSESSMENT
72F GERD, HLD and OA admitted for aftercare following Left TKR    S/P Left TKR  POD 1  Continue Bowel and pain control regimen per ortho    Incentive Spirometer for lung expansion;   Monitor Hgb and follow up electrolytes.      HLD  Statin    GERD  Protonix    History of Factor V Leiden  Worked up. Heterozygous and cleared    Diet  Regular    DVT Prophylaxis   Eliquis BID    Disposition  Medically okay for discharge
72F GERD, HLD and OA admitted for aftercare following Left TKR    S/P Left TKR  POD 1  Continue Bowel and pain control regimen per ortho    Incentive Spirometer for lung expansion;   Monitor Hgb and follow up electrolytes.      HLD  Statin    GERD  Protonix    History of Factor V Leiden  Worked up. Heterozygous and cleared    Diet  Regular    DVT Prophylaxis   Eliquis BID    Disposition  Medically okay for discharge, once pain better controlled.
Neurovascular status in tact

## 2022-12-21 NOTE — PROGRESS NOTE ADULT - SUBJECTIVE AND OBJECTIVE BOX
Discharge medication calendar:  Eliquis 2.5mg q12h x 2 weeks then ASA EC 81mg q12h x 2 weeks  Celecoxib 200mg q12h x 2-3 weeks  Omeprazole 20mg QAM x 4 weeks  APAP 1000mg q8h x 2-3 weeks  Narcotic PRN  Docusate 100mg TID while taking narcotic  Miralax, Senna, or Bisacodyl PRN for treatment of constipation  
Post Op Day #1    SUBJECTIVE    73yo Female status post left TKR .   Patient is alert and comfortable.    Pain is controlled with current pain regimen.  Denies nausea, vomiting, chest pain, shortness of breath, abdominal pain or fever.   No new complaints.    OBJECTIVE    Vital Signs Last 24 Hrs  T(C): 36.5 (20 Dec 2022 08:03), Max: 36.9 (19 Dec 2022 15:00)  T(F): 97.7 (20 Dec 2022 08:03), Max: 98.5 (19 Dec 2022 15:00)  HR: 62 (20 Dec 2022 08:03) (58 - 97)  BP: 98/62 (20 Dec 2022 08:03) (98/62 - 121/56)  BP(mean): --  RR: 18 (20 Dec 2022 08:03) (13 - 18)  SpO2: 97% (20 Dec 2022 08:03) (93% - 100%)    Parameters below as of 20 Dec 2022 08:03  Patient On (Oxygen Delivery Method): room air      I&O's Summary    19 Dec 2022 07:01  -  20 Dec 2022 07:00  --------------------------------------------------------  IN: 2050 mL / OUT: 200 mL / NET: 1850 mL        Left knee incision is clean, dry and intact.   No erythema/ No exudate/ No blistering/ No ecchymosis.   The calf is supple/nontender.   Sensation to light touch is grossly intact distally.   Motor function distally is intact.   No foot drop.   (2+) dorsalis pedis pulse. Capillary refill is less than 2 seconds. No cyanosis.                          11.6   8.73  )-----------( 154      ( 20 Dec 2022 08:22 )             34.3<L>  20 Dec 2022 08:22                        12.5   9.45  )-----------( 189      ( 19 Dec 2022 16:00 )             37.8   19 Dec 2022 16:00    20 Dec 2022 08:22    140    |  106    |  16     ----------------------------<  97     4.0     |  29     |  0.93   19 Dec 2022 16:00    139    |  106    |  15     ----------------------------<  158<H>  4.1     |  27     |  0.85     Ca    8.7        20 Dec 2022 08:22  Ca    8.7        19 Dec 2022 16:00        ASSESSMENT AND PLAN    - Orthopedically stable  - DVT prophylaxis: PAS +Eliquis 2.5mg twice daily  - Continue physical therapy and occupational therapy  - Weight bearing as tolerated of the left lower extremity with assistance of a walker  - Incentive spirometry encouraged  - Pain control as clinically indicated  - Disposition:  Home when medically stable and cleared by PT/OT     
Dennis Barnes M.D.    Patient is a 72y old  Female who presents with a chief complaint of Left Total Knee Replacement (21 Dec 2022 08:44)      SUBJECTIVE / OVERNIGHT EVENTS: no concerns Pain now controlled with tramadol.     Patient denies chest pain, SOB, abd pain, N/V, fever, chills, dysuria or any other complaints. All remainder ROS negative.     MEDICATIONS  (STANDING):  acetaminophen     Tablet .. 1000 milliGRAM(s) Oral every 8 hours  apixaban 2.5 milliGRAM(s) Oral every 12 hours  atorvastatin 10 milliGRAM(s) Oral at bedtime  celecoxib 200 milliGRAM(s) Oral every 12 hours  lactated ringers. 1000 milliLiter(s) (125 mL/Hr) IV Continuous <Continuous>  pantoprazole    Tablet 40 milliGRAM(s) Oral before breakfast  polyethylene glycol 3350 17 Gram(s) Oral at bedtime  senna 2 Tablet(s) Oral at bedtime    MEDICATIONS  (PRN):  aluminum hydroxide/magnesium hydroxide/simethicone Suspension 30 milliLiter(s) Oral four times a day PRN Indigestion  bisacodyl Suppository 10 milliGRAM(s) Rectal once PRN Constipation  magnesium hydroxide Suspension 30 milliLiter(s) Oral daily PRN Constipation  methocarbamol 500 milliGRAM(s) Oral every 8 hours PRN Muscle Spasm  ondansetron Injectable 4 milliGRAM(s) IV Push every 6 hours PRN Nausea and/or Vomiting  traMADol 50 milliGRAM(s) Oral every 4 hours PRN Moderate Pain (4 - 6)  traMADol 100 milliGRAM(s) Oral every 4 hours PRN Severe Pain (7 - 10)      I&O's Summary      PHYSICAL EXAM:  Vital Signs Last 24 Hrs  T(C): 36.6 (21 Dec 2022 07:53), Max: 36.8 (20 Dec 2022 15:14)  T(F): 97.8 (21 Dec 2022 07:53), Max: 98.2 (20 Dec 2022 15:14)  HR: 68 (21 Dec 2022 07:53) (59 - 68)  BP: 103/56 (21 Dec 2022 07:53) (98/56 - 108/71)  BP(mean): --  RR: 18 (21 Dec 2022 07:53) (16 - 18)  SpO2: 97% (21 Dec 2022 07:53) (94% - 98%)    Parameters below as of 21 Dec 2022 07:53  Patient On (Oxygen Delivery Method): room air        CONSTITUTIONAL: NAD, well-groomed  ENMT: Moist oral mucosa, no pharyngeal injection or exudates; normal dentition  RESPIRATORY: Normal respiratory effort; lungs are clear to auscultation bilaterally  CARDIOVASCULAR: Regular rate and rhythm, normal S1 and S2, no murmur/rub/gallop; No lower extremity edema; Peripheral pulses are 2+ bilaterally  ABDOMEN: Nontender to palpation, normoactive bowel sounds, no rebound/guarding; No hepatosplenomegaly  MUSCLOSKELETAL:  Normal gait; no clubbing or cyanosis of digits; no joint swelling or tenderness to palpation  PSYCH: A+O x3; affect appropriate  NEUROLOGY: CN 2-12 are intact and symmetric; no gross sensory deficits;   SKIN: No rashes; no palpable lesions    LABS:                        11.6   8.73  )-----------( 154      ( 20 Dec 2022 08:22 )             34.3     12-20    140  |  106  |  16  ----------------------------<  97  4.0   |  29  |  0.93    Ca    8.7      20 Dec 2022 08:22                CAPILLARY BLOOD GLUCOSE          RADIOLOGY & ADDITIONAL TESTS:  Results Reviewed:   Imaging Personally Reviewed:  Electrocardiogram Personally Reviewed:
Dennis Barnes M.D.    Patient is a 72y old  Female who presents with a chief complaint of Left total knee replacement (19 Dec 2022 13:22)      SUBJECTIVE / OVERNIGHT EVENTS: woke up in pain this AM. Tramadol isn't working. No BM yet.     Patient denies chest pain, SOB, abd pain, N/V, fever, chills, dysuria or any other complaints. All remainder ROS negative.     MEDICATIONS  (STANDING):  acetaminophen     Tablet .. 1000 milliGRAM(s) Oral every 8 hours  apixaban 2.5 milliGRAM(s) Oral <User Schedule>  atorvastatin 10 milliGRAM(s) Oral at bedtime  celecoxib 200 milliGRAM(s) Oral every 12 hours  lactated ringers. 1000 milliLiter(s) (125 mL/Hr) IV Continuous <Continuous>  pantoprazole    Tablet 40 milliGRAM(s) Oral before breakfast  polyethylene glycol 3350 17 Gram(s) Oral at bedtime  senna 2 Tablet(s) Oral at bedtime    MEDICATIONS  (PRN):  aluminum hydroxide/magnesium hydroxide/simethicone Suspension 30 milliLiter(s) Oral four times a day PRN Indigestion  diazepam    Tablet 5 milliGRAM(s) Oral every 8 hours PRN muscle spasms  HYDROmorphone   Tablet 2 milliGRAM(s) Oral every 3 hours PRN Moderate Pain (4 - 6)  HYDROmorphone   Tablet 4 milliGRAM(s) Oral every 3 hours PRN Severe Pain (7 - 10)  magnesium hydroxide Suspension 30 milliLiter(s) Oral daily PRN Constipation  methocarbamol 500 milliGRAM(s) Oral every 8 hours PRN Muscle Spasm  ondansetron Injectable 4 milliGRAM(s) IV Push every 6 hours PRN Nausea and/or Vomiting      I&O's Summary    19 Dec 2022 07:01  -  20 Dec 2022 07:00  --------------------------------------------------------  IN: 2050 mL / OUT: 200 mL / NET: 1850 mL        PHYSICAL EXAM:  Vital Signs Last 24 Hrs  T(C): 36.5 (20 Dec 2022 08:03), Max: 36.9 (19 Dec 2022 15:00)  T(F): 97.7 (20 Dec 2022 08:03), Max: 98.5 (19 Dec 2022 15:00)  HR: 62 (20 Dec 2022 08:03) (58 - 97)  BP: 98/62 (20 Dec 2022 08:03) (98/62 - 121/56)  BP(mean): --  RR: 18 (20 Dec 2022 08:03) (13 - 18)  SpO2: 97% (20 Dec 2022 08:03) (93% - 100%)    Parameters below as of 20 Dec 2022 08:03  Patient On (Oxygen Delivery Method): room air        CONSTITUTIONAL: NAD, well-groomed  ENMT: Moist oral mucosa, no pharyngeal injection or exudates; normal dentition  RESPIRATORY: Normal respiratory effort; lungs are clear to auscultation bilaterally  CARDIOVASCULAR: Regular rate and rhythm, normal S1 and S2, no murmur/rub/gallop; No lower extremity edema  ABDOMEN: Nontender to palpation, normoactive bowel sounds  PSYCH: A+O x3; affect appropriate  NEUROLOGY: CN 2-12 are intact and symmetric; no gross sensory deficits;   SKIN: No rashes; no palpable lesions    LABS:                        11.6   8.73  )-----------( 154      ( 20 Dec 2022 08:22 )             34.3     12-20    140  |  106  |  16  ----------------------------<  97  4.0   |  29  |  0.93    Ca    8.7      20 Dec 2022 08:22                CAPILLARY BLOOD GLUCOSE          RADIOLOGY & ADDITIONAL TESTS:  Results Reviewed:   Imaging Personally Reviewed:  Electrocardiogram Personally Reviewed:
Ortho PA - Post Op Check - S/P - TKR- left    Pt alert and comfortable with no complaints, pain controlled denies nausea     Vital Signs Last 24 Hrs  T(C): 36.5 (12-19-22 @ 13:08), Max: 36.7 (12-19-22 @ 07:28)  T(F): 97.7 (12-19-22 @ 13:08), Max: 98.1 (12-19-22 @ 11:30)  HR: 88 (12-19-22 @ 13:08) (83 - 97)  BP: 104/48 (12-19-22 @ 13:08) (98/72 - 121/56)  BP(mean): --  RR: 16 (12-19-22 @ 13:08) (13 - 18)  SpO2: 100% (12-19-22 @ 13:08) (98% - 100%)  I&O's Detail  I&O's Summary               PE:  *Knee-primary surgical bandage dry and intact. Feet mobile and sensate.  *EHLs/ant.tibs. 5/5 PAS on LE's.  Calves soft and nontender.    A/P: Ortho stable  - Continue post-op orders; pain management  - Check labs today and in A.M.  - DVT prevention with Eliquis  - PT /OT for OOB, full WBAT  - Medical consult  -Discharge planning  -Will continue to monitor closely with attendings.
POD#:  2  S/P: Left TKR                       SUBJECTIVE: Patient seen and examined.  Ambulated with PT. Tolerating diet. Voiding. Tolerating pain medications well. Patient laying comfortably in bed without complaints  Reported Pain Score = 5/10    OBJECTIVE:     Vital Signs Last 24 Hrs  T(C): 36.6 (21 Dec 2022 07:53), Max: 36.8 (20 Dec 2022 15:14)  T(F): 97.8 (21 Dec 2022 07:53), Max: 98.2 (20 Dec 2022 15:14)  HR: 68 (21 Dec 2022 07:53) (59 - 68)  BP: 103/56 (21 Dec 2022 07:53) (98/56 - 108/71)  BP(mean): --  RR: 18 (21 Dec 2022 07:53) (16 - 18)  SpO2: 97% (21 Dec 2022 07:53) (94% - 98%)    Parameters below as of 21 Dec 2022 07:53  Patient On (Oxygen Delivery Method): room air        Left Knee:          Dressing: clean/dry/intact    Bilateral LEs:         Sensation:  intact to light touch          Motor exam:  5/5 dorsiflexion/plantarflexion/EHL          2+ DP pulses          calf supple, NT         SCDs in place    LABS:                        11.6   8.73  )-----------( 154      ( 20 Dec 2022 08:22 )             34.3     12-20    140  |  106  |  16  ----------------------------<  97  4.0   |  29  |  0.93    Ca    8.7      20 Dec 2022 08:22            MEDICATIONS:  Anticoagulation:  apixaban 2.5 milliGRAM(s) Oral every 12 hours      Pain medications:   acetaminophen     Tablet .. 1000 milliGRAM(s) Oral every 8 hours  celecoxib 200 milliGRAM(s) Oral every 12 hours  methocarbamol 500 milliGRAM(s) Oral every 8 hours PRN  ondansetron Injectable 4 milliGRAM(s) IV Push every 6 hours PRN  traMADol 50 milliGRAM(s) Oral every 4 hours PRN  traMADol 100 milliGRAM(s) Oral every 4 hours PRN        A/P :                           s/p Left TKR POD # 2  -    Pain control Tramadol, Celebrex, Acetaminophen  -    DVT ppx: Eliquis 2.5mg q 12 h   -    Weight bearing status: WBAT   -    Physical Therapy  -    Occupational Therapy  -    Discharge plan:  Home with home Pt when cleared by medicine and PT

## 2022-12-22 ENCOUNTER — TRANSCRIPTION ENCOUNTER (OUTPATIENT)
Age: 72
End: 2022-12-22

## 2023-01-04 PROCEDURE — C1776: CPT

## 2023-01-04 PROCEDURE — C1713: CPT

## 2023-01-04 PROCEDURE — 97161 PT EVAL LOW COMPLEX 20 MIN: CPT

## 2023-01-04 PROCEDURE — 85027 COMPLETE CBC AUTOMATED: CPT

## 2023-01-04 PROCEDURE — 97165 OT EVAL LOW COMPLEX 30 MIN: CPT

## 2023-01-04 PROCEDURE — 97535 SELF CARE MNGMENT TRAINING: CPT

## 2023-01-04 PROCEDURE — 97530 THERAPEUTIC ACTIVITIES: CPT

## 2023-01-04 PROCEDURE — U0003: CPT

## 2023-01-04 PROCEDURE — 97110 THERAPEUTIC EXERCISES: CPT

## 2023-01-04 PROCEDURE — 80048 BASIC METABOLIC PNL TOTAL CA: CPT

## 2023-01-04 PROCEDURE — 73560 X-RAY EXAM OF KNEE 1 OR 2: CPT

## 2023-01-04 PROCEDURE — U0005: CPT

## 2023-01-04 PROCEDURE — 97116 GAIT TRAINING THERAPY: CPT

## 2023-01-04 PROCEDURE — C1889: CPT

## 2023-01-04 PROCEDURE — 36415 COLL VENOUS BLD VENIPUNCTURE: CPT

## 2023-01-05 ENCOUNTER — APPOINTMENT (OUTPATIENT)
Dept: ORTHOPEDIC SURGERY | Facility: CLINIC | Age: 73
End: 2023-01-05
Payer: MEDICARE

## 2023-01-05 VITALS — HEART RATE: 80 BPM | SYSTOLIC BLOOD PRESSURE: 111 MMHG | DIASTOLIC BLOOD PRESSURE: 67 MMHG

## 2023-01-05 DIAGNOSIS — Z96.659 PRESENCE OF UNSPECIFIED ARTIFICIAL KNEE JOINT: ICD-10-CM

## 2023-01-05 PROCEDURE — 73562 X-RAY EXAM OF KNEE 3: CPT | Mod: LT

## 2023-01-05 PROCEDURE — 99024 POSTOP FOLLOW-UP VISIT: CPT

## 2023-01-05 RX ORDER — TIZANIDINE HYDROCHLORIDE 6 MG/1
CAPSULE ORAL
Refills: 0 | Status: ACTIVE | COMMUNITY

## 2023-01-05 RX ORDER — AMOXICILLIN 500 MG/1
500 CAPSULE ORAL
Qty: 30 | Refills: 0 | Status: ACTIVE | COMMUNITY
Start: 2023-01-05 | End: 1900-01-01

## 2023-01-05 RX ORDER — ASPIRIN 81 MG
81 TABLET, DELAYED RELEASE (ENTERIC COATED) ORAL
Refills: 0 | Status: ACTIVE | COMMUNITY

## 2023-01-05 RX ORDER — BIOTIN 10 MG
TABLET ORAL
Refills: 0 | Status: ACTIVE | COMMUNITY

## 2023-01-05 RX ORDER — CELECOXIB 200 MG/1
200 CAPSULE ORAL
Refills: 0 | Status: ACTIVE | COMMUNITY

## 2023-01-19 PROBLEM — I10 ESSENTIAL (PRIMARY) HYPERTENSION: Chronic | Status: ACTIVE | Noted: 2022-12-02

## 2023-01-19 PROBLEM — T56.91XA: Chronic | Status: ACTIVE | Noted: 2022-12-02

## 2023-01-19 PROBLEM — E78.5 HYPERLIPIDEMIA, UNSPECIFIED: Chronic | Status: ACTIVE | Noted: 2022-12-02

## 2023-01-19 PROBLEM — Z92.29 PERSONAL HISTORY OF OTHER DRUG THERAPY: Chronic | Status: ACTIVE | Noted: 2022-12-02

## 2023-02-14 ENCOUNTER — APPOINTMENT (OUTPATIENT)
Dept: ORTHOPEDIC SURGERY | Facility: CLINIC | Age: 73
End: 2023-02-14
Payer: MEDICARE

## 2023-02-14 VITALS
HEART RATE: 74 BPM | WEIGHT: 160 LBS | BODY MASS INDEX: 24.25 KG/M2 | HEIGHT: 68 IN | DIASTOLIC BLOOD PRESSURE: 76 MMHG | SYSTOLIC BLOOD PRESSURE: 137 MMHG

## 2023-02-14 DIAGNOSIS — Z96.652 PRESENCE OF LEFT ARTIFICIAL KNEE JOINT: ICD-10-CM

## 2023-02-14 DIAGNOSIS — M16.12 UNILATERAL PRIMARY OSTEOARTHRITIS, LEFT HIP: ICD-10-CM

## 2023-02-14 PROCEDURE — 73562 X-RAY EXAM OF KNEE 3: CPT | Mod: LT

## 2023-02-14 PROCEDURE — 73502 X-RAY EXAM HIP UNI 2-3 VIEWS: CPT | Mod: LT

## 2023-02-14 PROCEDURE — 99024 POSTOP FOLLOW-UP VISIT: CPT

## 2023-02-14 NOTE — END OF VISIT
[FreeTextEntry3] : I, Dr. Leyla Sarmiento MD, personally performed the evaluation and management services for this established patient who presented today with a new problem/exacerbation of an existing condition. That E/M includes conducting the examination, assessing all new/exacerbated conditions, and establishing a new plan of care. Today, MY ACP was here to assist with recording the history in my evaluation and management services of this new problem/exacerbated condition to be followed going forward.\par

## 2023-02-14 NOTE — HISTORY OF PRESENT ILLNESS
[___ Months Post Op] : [unfilled] months post op [0] : no pain reported [Doing Well] : is doing well [Clean/Dry/Intact] : clean, dry and intact [Healed] : healed [Neuro Intact] : an unremarkable neurological exam [Vascular Intact] : ~T peripheral vascular exam normal [Negative Jerel's] : maneuvers demonstrated a negative Jerel's sign [Xray (Date:___)] : [unfilled] Xray -  [Chills] : no chills [Fever] : no fever [Erythema] : not erythematous [Discharge] : absent of discharge [Swelling] : not swollen [Dehiscence] : not dehisced [de-identified] : S/P Left TKR DOS 12/19/2022\par Left hip pain [de-identified] : 72-year-old female presents for follow-up of the left knee status post left total knee replacement on 12/19/2022.  Overall in the postoperative period patient is doing very well.  She notes that she is having very little to no pain in the left knee.  She is exercising on her own and has had stopped physical therapy.  She is ambulating freely without any assistive devices.  She is not taking any anti-inflammatory medications.  She also states that she is having pain in the left hip as well worsening over the last several years.  She has had this pain for several years and has not improved.  She does have a date for a left anterior total hip replacement on 3/15/2023.\par She denies any fevers chills chest pain calf pain shortness of breath [de-identified] : Patient has a well-healed surgical scar. Patient comes to full extension. Patient is able to flex to approximately 130° there is a very slight mediolateral instability. There is no anterior drawer. There is no calf tenderness. There is good sensation and pulses at the ankle. Good strength against resistance in EHL and dorsiflexion. There is good sensation at the foot \par \par On physical exam of the left hip skin is warm and dry without erythema ecchymosis signs or symptoms of infection superficial or deep.  There is no tenderness throughout the hip.  Patient is able to flex the knee to 90 degrees however there is severe pain with internal and external rotation.  There is a negative Homans.  Strength is well-maintained in all planes sensation is intact throughout the distal lower extremity.  There is a 2+ dorsalis pedis pulse. [de-identified] : Radiographs of the left knee were performed today. There are stable interfaces between bone, cement, and implant in all 3 components. There is stable positioning of the femoral, tibial, and patellar components. There is equidistant spacing on each side of the tibial bearing. There is no fracture, foreign body, subsidement, osteolysis, or notable effusion. The patellar component is tracking centrally in the trochlear groove of the femoral component. \par \par Radiographs of the pelvis and left hip were performed today in the [facility] office.\par Grade 4 bone-on-bone cartilaginous wear in the F- A joint.\par Subchondral sclerosis noted on both sides the F-A  joint.\par Superior and inferior rim osteophytes are noted. [de-identified] : Dr. Sarmiento evaluated this patient, reviewed the radiographs, and is guiding the patients orthopedic management.\par The patient was advised to continue their routine activities of daily living within tolerances, home exercises as guided by PT, and continue outpatient PT until goals are achieved. \par Instructions for LE strengthening, ROM, and balance exercises were reviewed..\par The patient was advised to follow up with their PCP, if not done already, for a post op medical reevaluation including lab work. \par The patient was advised to continue with regular orthopedic follow up post TKR and may come to our office if any new problems arise for urgent orthopedic reevaluation. \par All patients questions and concerns were addressed to satisfaction.\par Advised the patient to continue with antibiotics prior to dental procedures as per Dr. Sarmiento's protocol. \par \par Dr. Sarmiento evaluated this patient and will be guiding this patient's entire orthopedic management plan. The patient was advised that based upon their clinical presentation and radiographic findings they meet inclusion criteria for benefitting from a left total hip arthroplasty as a treatment option for severe arthritis of the hip. Patient has tried and failed conservative management\par \par The spectrum of treatments for severe hip DJD were reviewed in detail. I reviewed in detail the goals, alternatives, risks and benefits of total hip arthroplasty.\par \par The patient was advised of the possible complications which are inclusive of but not exclusive to VTE-related, infectious-related, anesthetic-related, surgically-related, medically-related, dislocation-related, and implant-related.\par \par The patient was advised that limb length equality may not be assured secondary to variabilities in pelvic malrotation, stable intraoperative fit, opposite side wear, and other anatomic deformities of the lower extremity. The patient agreed to the plan of care, as well as use of implants.\par \par A non-cemented type hip implant is utilized after consideration of bony integrity intraoperatively. The patient was demonstrated. A model of the total hip replacement. Advised on the brand and preliminary model of the implant that I use. \par \par The patient was advised that they will require a medical preoperative risk evaluation by their PCP. Further medical subspecialty clearances such as cardiac may be indicated if felt needed by their PCP.\par \par The patient was advised she may call our office after careful consideration of their treatment options and further consultation with any other physician to begin the process of preoperative planning for elective left anterior thr at a time of their choosing.

## 2023-02-17 ENCOUNTER — TRANSCRIPTION ENCOUNTER (OUTPATIENT)
Age: 73
End: 2023-02-17

## 2023-02-24 ENCOUNTER — OUTPATIENT (OUTPATIENT)
Dept: OUTPATIENT SERVICES | Facility: HOSPITAL | Age: 73
LOS: 1 days | End: 2023-02-24
Payer: MEDICARE

## 2023-02-24 VITALS
DIASTOLIC BLOOD PRESSURE: 87 MMHG | HEIGHT: 67 IN | TEMPERATURE: 97 F | WEIGHT: 160.94 LBS | SYSTOLIC BLOOD PRESSURE: 155 MMHG | HEART RATE: 79 BPM | RESPIRATION RATE: 12 BRPM | OXYGEN SATURATION: 98 %

## 2023-02-24 DIAGNOSIS — Z98.890 OTHER SPECIFIED POSTPROCEDURAL STATES: Chronic | ICD-10-CM

## 2023-02-24 DIAGNOSIS — M16.12 UNILATERAL PRIMARY OSTEOARTHRITIS, LEFT HIP: ICD-10-CM

## 2023-02-24 DIAGNOSIS — Z96.652 PRESENCE OF LEFT ARTIFICIAL KNEE JOINT: Chronic | ICD-10-CM

## 2023-02-24 DIAGNOSIS — Z98.89 OTHER SPECIFIED POSTPROCEDURAL STATES: Chronic | ICD-10-CM

## 2023-02-24 DIAGNOSIS — Z96.651 PRESENCE OF RIGHT ARTIFICIAL KNEE JOINT: Chronic | ICD-10-CM

## 2023-02-24 DIAGNOSIS — N84.0 POLYP OF CORPUS UTERI: Chronic | ICD-10-CM

## 2023-02-24 DIAGNOSIS — D68.51 ACTIVATED PROTEIN C RESISTANCE: ICD-10-CM

## 2023-02-24 DIAGNOSIS — Z01.818 ENCOUNTER FOR OTHER PREPROCEDURAL EXAMINATION: ICD-10-CM

## 2023-02-24 DIAGNOSIS — Z96.641 PRESENCE OF RIGHT ARTIFICIAL HIP JOINT: Chronic | ICD-10-CM

## 2023-02-24 LAB
A1C WITH ESTIMATED AVERAGE GLUCOSE RESULT: 5.2 % — SIGNIFICANT CHANGE UP (ref 4–5.6)
ALBUMIN SERPL ELPH-MCNC: 3.8 G/DL — SIGNIFICANT CHANGE UP (ref 3.3–5)
ALP SERPL-CCNC: 57 U/L — SIGNIFICANT CHANGE UP (ref 30–120)
ALT FLD-CCNC: 36 U/L DA — SIGNIFICANT CHANGE UP (ref 10–60)
ANION GAP SERPL CALC-SCNC: 6 MMOL/L — SIGNIFICANT CHANGE UP (ref 5–17)
APTT BLD: 25.6 SEC — LOW (ref 27.5–35.5)
AST SERPL-CCNC: 32 U/L — SIGNIFICANT CHANGE UP (ref 10–40)
BILIRUB SERPL-MCNC: 0.4 MG/DL — SIGNIFICANT CHANGE UP (ref 0.2–1.2)
BLD GP AB SCN SERPL QL: SIGNIFICANT CHANGE UP
BUN SERPL-MCNC: 24 MG/DL — HIGH (ref 7–23)
CALCIUM SERPL-MCNC: 10.1 MG/DL — SIGNIFICANT CHANGE UP (ref 8.4–10.5)
CHLORIDE SERPL-SCNC: 103 MMOL/L — SIGNIFICANT CHANGE UP (ref 96–108)
CO2 SERPL-SCNC: 30 MMOL/L — SIGNIFICANT CHANGE UP (ref 22–31)
CREAT SERPL-MCNC: 0.87 MG/DL — SIGNIFICANT CHANGE UP (ref 0.5–1.3)
EGFR: 70 ML/MIN/1.73M2 — SIGNIFICANT CHANGE UP
ESTIMATED AVERAGE GLUCOSE: 103 MG/DL — SIGNIFICANT CHANGE UP (ref 68–114)
GLUCOSE SERPL-MCNC: 94 MG/DL — SIGNIFICANT CHANGE UP (ref 70–99)
HCT VFR BLD CALC: 44.5 % — SIGNIFICANT CHANGE UP (ref 34.5–45)
HGB BLD-MCNC: 14.4 G/DL — SIGNIFICANT CHANGE UP (ref 11.5–15.5)
INR BLD: 0.96 RATIO — SIGNIFICANT CHANGE UP (ref 0.88–1.16)
MCHC RBC-ENTMCNC: 31.5 PG — SIGNIFICANT CHANGE UP (ref 27–34)
MCHC RBC-ENTMCNC: 32.4 GM/DL — SIGNIFICANT CHANGE UP (ref 32–36)
MCV RBC AUTO: 97.4 FL — SIGNIFICANT CHANGE UP (ref 80–100)
NRBC # BLD: 0 /100 WBCS — SIGNIFICANT CHANGE UP (ref 0–0)
PLATELET # BLD AUTO: 207 K/UL — SIGNIFICANT CHANGE UP (ref 150–400)
POTASSIUM SERPL-MCNC: 5.2 MMOL/L — SIGNIFICANT CHANGE UP (ref 3.5–5.3)
POTASSIUM SERPL-SCNC: 5.2 MMOL/L — SIGNIFICANT CHANGE UP (ref 3.5–5.3)
PROT SERPL-MCNC: 7.7 G/DL — SIGNIFICANT CHANGE UP (ref 6–8.3)
PROTHROM AB SERPL-ACNC: 11.3 SEC — SIGNIFICANT CHANGE UP (ref 10.5–13.4)
RBC # BLD: 4.57 M/UL — SIGNIFICANT CHANGE UP (ref 3.8–5.2)
RBC # FLD: 12 % — SIGNIFICANT CHANGE UP (ref 10.3–14.5)
SODIUM SERPL-SCNC: 139 MMOL/L — SIGNIFICANT CHANGE UP (ref 135–145)
WBC # BLD: 4.86 K/UL — SIGNIFICANT CHANGE UP (ref 3.8–10.5)
WBC # FLD AUTO: 4.86 K/UL — SIGNIFICANT CHANGE UP (ref 3.8–10.5)

## 2023-02-24 PROCEDURE — 93005 ELECTROCARDIOGRAM TRACING: CPT

## 2023-02-24 PROCEDURE — 93010 ELECTROCARDIOGRAM REPORT: CPT

## 2023-02-24 PROCEDURE — G0463: CPT

## 2023-02-24 RX ORDER — CHOLECALCIFEROL (VITAMIN D3) 125 MCG
1 CAPSULE ORAL
Qty: 0 | Refills: 0 | DISCHARGE

## 2023-02-24 NOTE — H&P PST ADULT - PROBLEM SELECTOR PLAN 2
The patient was advised to obtain hematology clearance and refused.  She stated that she did not ever have hematology clearance prior to any surgery and she will not obtain clearance.  Dr Sarmiento office will be notified.

## 2023-02-24 NOTE — H&P PST ADULT - PROBLEM SELECTOR PLAN 1
left total hip replacement is planned for 3/15/2023  Covid testing will be done on 3/13/2023 @ [facility]  Diagnostic testing performed  Medical clearance requested  Pre op instructions were reviewed  Best wishes offered

## 2023-02-24 NOTE — H&P PST ADULT - MUSCULOSKELETAL
details… no joint swelling/no joint erythema/no joint warmth/no calf tenderness/decreased ROM due to pain/no chest wall tenderness/extremities exam

## 2023-02-24 NOTE — H&P PST ADULT - NSICDXPASTSURGICALHX_GEN_ALL_CORE_FT
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

## 2023-02-24 NOTE — H&P PST ADULT - HISTORY OF PRESENT ILLNESS
71 yo female reports stiffness left hip and pain with exercise.  She is scheduled for left total anterior hip replacement on 3/15/2023 @ [facility].

## 2023-02-24 NOTE — H&P PST ADULT - NSICDXPASTMEDICALHX_GEN_ALL_CORE_FT
PAST MEDICAL HISTORY:  COVID-19 vaccine series completed bivalent 9/26/22    Factor V Leiden heterozygous and asymptomatic, 2021 had workup and there is no problem, no history of any problems    History of low back pain with spasm after right knee replacement    Hyperlipidemia     Insomnia difficulty staying asleep    Metallosis history of 2016    Osteoarthritis of left hip     White coat syndrome with hypertension      PAST MEDICAL HISTORY:  COVID-19 vaccine series completed bivalent 9/26/22    Heterozygous factor V Leiden mutation     Hyperlipidemia     Insomnia difficulty staying asleep    Metallosis history of 2016    Osteoarthritis of left hip     White coat syndrome with hypertension

## 2023-02-25 LAB
MRSA PCR RESULT.: SIGNIFICANT CHANGE UP
S AUREUS DNA NOSE QL NAA+PROBE: SIGNIFICANT CHANGE UP

## 2023-03-13 ENCOUNTER — LABORATORY RESULT (OUTPATIENT)
Age: 73
End: 2023-03-13

## 2023-03-15 ENCOUNTER — INPATIENT (INPATIENT)
Facility: HOSPITAL | Age: 73
LOS: 1 days | Discharge: HOME | DRG: 470 | End: 2023-03-17
Attending: ORTHOPAEDIC SURGERY | Admitting: ORTHOPAEDIC SURGERY
Payer: MEDICARE

## 2023-03-15 ENCOUNTER — TRANSCRIPTION ENCOUNTER (OUTPATIENT)
Age: 73
End: 2023-03-15

## 2023-03-15 ENCOUNTER — APPOINTMENT (OUTPATIENT)
Dept: ORTHOPEDIC SURGERY | Facility: HOSPITAL | Age: 73
End: 2023-03-15

## 2023-03-15 VITALS
HEIGHT: 68 IN | WEIGHT: 164.24 LBS | TEMPERATURE: 98 F | SYSTOLIC BLOOD PRESSURE: 133 MMHG | OXYGEN SATURATION: 100 % | HEART RATE: 76 BPM | RESPIRATION RATE: 76 BRPM | DIASTOLIC BLOOD PRESSURE: 71 MMHG

## 2023-03-15 DIAGNOSIS — Z98.89 OTHER SPECIFIED POSTPROCEDURAL STATES: Chronic | ICD-10-CM

## 2023-03-15 DIAGNOSIS — M16.12 UNILATERAL PRIMARY OSTEOARTHRITIS, LEFT HIP: ICD-10-CM

## 2023-03-15 DIAGNOSIS — Z96.652 PRESENCE OF LEFT ARTIFICIAL KNEE JOINT: Chronic | ICD-10-CM

## 2023-03-15 DIAGNOSIS — Z98.890 OTHER SPECIFIED POSTPROCEDURAL STATES: Chronic | ICD-10-CM

## 2023-03-15 DIAGNOSIS — Z96.641 PRESENCE OF RIGHT ARTIFICIAL HIP JOINT: Chronic | ICD-10-CM

## 2023-03-15 DIAGNOSIS — Z96.651 PRESENCE OF RIGHT ARTIFICIAL KNEE JOINT: Chronic | ICD-10-CM

## 2023-03-15 PROCEDURE — 27130 TOTAL HIP ARTHROPLASTY: CPT | Mod: 79,LT

## 2023-03-15 PROCEDURE — 99223 1ST HOSP IP/OBS HIGH 75: CPT

## 2023-03-15 PROCEDURE — 27130 TOTAL HIP ARTHROPLASTY: CPT | Mod: AS

## 2023-03-15 DEVICE — SLEEVE BIOLOX DELTA OPTION SZ -3: Type: IMPLANTABLE DEVICE | Status: FUNCTIONAL

## 2023-03-15 DEVICE — CUP ACET EMPOWR CLUSTER 50MM ALPHA E: Type: IMPLANTABLE DEVICE | Status: FUNCTIONAL

## 2023-03-15 DEVICE — STEM FEM P2 LAT OFFSET SZ 7: Type: IMPLANTABLE DEVICE | Status: FUNCTIONAL

## 2023-03-15 DEVICE — FEM HD BIOLOX DELTA 28MM: Type: IMPLANTABLE DEVICE | Status: FUNCTIONAL

## 2023-03-15 DEVICE — BONE WAX 2.5GM: Type: IMPLANTABLE DEVICE | Status: FUNCTIONAL

## 2023-03-15 DEVICE — SCREW ACET SZ 6.5X30MM: Type: IMPLANTABLE DEVICE | Status: FUNCTIONAL

## 2023-03-15 DEVICE — BEARING EMPOWR DUAL MOBILITY 40 ALPHA E: Type: IMPLANTABLE DEVICE | Status: FUNCTIONAL

## 2023-03-15 DEVICE — LINER ACET EMPOWR METAL DUAL MOBILITY 40 ALPHA E: Type: IMPLANTABLE DEVICE | Status: FUNCTIONAL

## 2023-03-15 RX ORDER — CEFAZOLIN SODIUM 1 G
2000 VIAL (EA) INJECTION
Refills: 0 | Status: COMPLETED | OUTPATIENT
Start: 2023-03-15 | End: 2023-03-15

## 2023-03-15 RX ORDER — PANTOPRAZOLE SODIUM 20 MG/1
40 TABLET, DELAYED RELEASE ORAL
Refills: 0 | Status: DISCONTINUED | OUTPATIENT
Start: 2023-03-15 | End: 2023-03-15

## 2023-03-15 RX ORDER — APREPITANT 80 MG/1
40 CAPSULE ORAL ONCE
Refills: 0 | Status: COMPLETED | OUTPATIENT
Start: 2023-03-15 | End: 2023-03-15

## 2023-03-15 RX ORDER — SODIUM CHLORIDE 9 MG/ML
1000 INJECTION, SOLUTION INTRAVENOUS
Refills: 0 | Status: DISCONTINUED | OUTPATIENT
Start: 2023-03-15 | End: 2023-03-16

## 2023-03-15 RX ORDER — POLYETHYLENE GLYCOL 3350 17 G/17G
17 POWDER, FOR SOLUTION ORAL AT BEDTIME
Refills: 0 | Status: DISCONTINUED | OUTPATIENT
Start: 2023-03-15 | End: 2023-03-17

## 2023-03-15 RX ORDER — CELECOXIB 200 MG/1
1 CAPSULE ORAL
Qty: 56 | Refills: 0
Start: 2023-03-15 | End: 2023-04-11

## 2023-03-15 RX ORDER — OMEPRAZOLE 10 MG/1
1 CAPSULE, DELAYED RELEASE ORAL
Qty: 28 | Refills: 0
Start: 2023-03-15 | End: 2023-04-11

## 2023-03-15 RX ORDER — FAMOTIDINE 10 MG/ML
20 INJECTION INTRAVENOUS
Refills: 0 | Status: DISCONTINUED | OUTPATIENT
Start: 2023-03-15 | End: 2023-03-17

## 2023-03-15 RX ORDER — PANTOPRAZOLE SODIUM 20 MG/1
1 TABLET, DELAYED RELEASE ORAL
Qty: 0 | Refills: 0 | DISCHARGE
Start: 2023-03-15

## 2023-03-15 RX ORDER — ACETAMINOPHEN 500 MG
1000 TABLET ORAL EVERY 8 HOURS
Refills: 0 | Status: DISCONTINUED | OUTPATIENT
Start: 2023-03-15 | End: 2023-03-17

## 2023-03-15 RX ORDER — DEXAMETHASONE 0.5 MG/5ML
8 ELIXIR ORAL ONCE
Refills: 0 | Status: COMPLETED | OUTPATIENT
Start: 2023-03-16 | End: 2023-03-16

## 2023-03-15 RX ORDER — SODIUM CHLORIDE 9 MG/ML
1000 INJECTION, SOLUTION INTRAVENOUS
Refills: 0 | Status: DISCONTINUED | OUTPATIENT
Start: 2023-03-15 | End: 2023-03-15

## 2023-03-15 RX ORDER — FAMOTIDINE 10 MG/ML
40 INJECTION INTRAVENOUS DAILY
Refills: 0 | Status: DISCONTINUED | OUTPATIENT
Start: 2023-03-15 | End: 2023-03-15

## 2023-03-15 RX ORDER — ACETAMINOPHEN 500 MG
1000 TABLET ORAL ONCE
Refills: 0 | Status: COMPLETED | OUTPATIENT
Start: 2023-03-15 | End: 2023-03-15

## 2023-03-15 RX ORDER — ONDANSETRON 8 MG/1
4 TABLET, FILM COATED ORAL ONCE
Refills: 0 | Status: DISCONTINUED | OUTPATIENT
Start: 2023-03-15 | End: 2023-03-15

## 2023-03-15 RX ORDER — SODIUM CHLORIDE 9 MG/ML
500 INJECTION INTRAMUSCULAR; INTRAVENOUS; SUBCUTANEOUS ONCE
Refills: 0 | Status: COMPLETED | OUTPATIENT
Start: 2023-03-15 | End: 2023-03-15

## 2023-03-15 RX ORDER — CEFAZOLIN SODIUM 1 G
2000 VIAL (EA) INJECTION ONCE
Refills: 0 | Status: COMPLETED | OUTPATIENT
Start: 2023-03-15 | End: 2023-03-15

## 2023-03-15 RX ORDER — MAGNESIUM HYDROXIDE 400 MG/1
30 TABLET, CHEWABLE ORAL DAILY
Refills: 0 | Status: DISCONTINUED | OUTPATIENT
Start: 2023-03-15 | End: 2023-03-17

## 2023-03-15 RX ORDER — CELECOXIB 200 MG/1
200 CAPSULE ORAL EVERY 12 HOURS
Refills: 0 | Status: DISCONTINUED | OUTPATIENT
Start: 2023-03-15 | End: 2023-03-17

## 2023-03-15 RX ORDER — ONDANSETRON 8 MG/1
4 TABLET, FILM COATED ORAL EVERY 6 HOURS
Refills: 0 | Status: DISCONTINUED | OUTPATIENT
Start: 2023-03-15 | End: 2023-03-17

## 2023-03-15 RX ORDER — ATORVASTATIN CALCIUM 80 MG/1
10 TABLET, FILM COATED ORAL AT BEDTIME
Refills: 0 | Status: DISCONTINUED | OUTPATIENT
Start: 2023-03-15 | End: 2023-03-17

## 2023-03-15 RX ORDER — APIXABAN 2.5 MG/1
2.5 TABLET, FILM COATED ORAL EVERY 12 HOURS
Refills: 0 | Status: DISCONTINUED | OUTPATIENT
Start: 2023-03-16 | End: 2023-03-17

## 2023-03-15 RX ORDER — ATORVASTATIN CALCIUM 80 MG/1
1 TABLET, FILM COATED ORAL
Qty: 0 | Refills: 0 | DISCHARGE

## 2023-03-15 RX ORDER — TRAMADOL HYDROCHLORIDE 50 MG/1
50 TABLET ORAL
Refills: 0 | Status: DISCONTINUED | OUTPATIENT
Start: 2023-03-15 | End: 2023-03-17

## 2023-03-15 RX ORDER — TRANEXAMIC ACID 100 MG/ML
1000 INJECTION, SOLUTION INTRAVENOUS ONCE
Refills: 0 | Status: COMPLETED | OUTPATIENT
Start: 2023-03-15 | End: 2023-03-15

## 2023-03-15 RX ORDER — SENNA PLUS 8.6 MG/1
2 TABLET ORAL AT BEDTIME
Refills: 0 | Status: DISCONTINUED | OUTPATIENT
Start: 2023-03-15 | End: 2023-03-17

## 2023-03-15 RX ORDER — HYDROMORPHONE HYDROCHLORIDE 2 MG/ML
0.5 INJECTION INTRAMUSCULAR; INTRAVENOUS; SUBCUTANEOUS
Refills: 0 | Status: DISCONTINUED | OUTPATIENT
Start: 2023-03-15 | End: 2023-03-17

## 2023-03-15 RX ORDER — CELECOXIB 200 MG/1
1 CAPSULE ORAL
Qty: 0 | Refills: 0 | DISCHARGE
Start: 2023-03-15

## 2023-03-15 RX ORDER — HYDROMORPHONE HYDROCHLORIDE 2 MG/ML
0.5 INJECTION INTRAMUSCULAR; INTRAVENOUS; SUBCUTANEOUS
Refills: 0 | Status: DISCONTINUED | OUTPATIENT
Start: 2023-03-15 | End: 2023-03-15

## 2023-03-15 RX ORDER — HYDROMORPHONE HYDROCHLORIDE 2 MG/ML
1 INJECTION INTRAMUSCULAR; INTRAVENOUS; SUBCUTANEOUS
Refills: 0 | Status: DISCONTINUED | OUTPATIENT
Start: 2023-03-15 | End: 2023-03-15

## 2023-03-15 RX ORDER — APIXABAN 2.5 MG/1
1 TABLET, FILM COATED ORAL
Qty: 56 | Refills: 0
Start: 2023-03-15 | End: 2023-04-11

## 2023-03-15 RX ORDER — TRAMADOL HYDROCHLORIDE 50 MG/1
100 TABLET ORAL EVERY 6 HOURS
Refills: 0 | Status: DISCONTINUED | OUTPATIENT
Start: 2023-03-15 | End: 2023-03-17

## 2023-03-15 RX ORDER — APIXABAN 2.5 MG/1
1 TABLET, FILM COATED ORAL
Qty: 0 | Refills: 0 | DISCHARGE
Start: 2023-03-15

## 2023-03-15 RX ORDER — CHLORHEXIDINE GLUCONATE 213 G/1000ML
1 SOLUTION TOPICAL ONCE
Refills: 0 | Status: COMPLETED | OUTPATIENT
Start: 2023-03-15 | End: 2023-03-15

## 2023-03-15 RX ORDER — ACETAMINOPHEN 500 MG
2 TABLET ORAL
Qty: 0 | Refills: 0 | DISCHARGE
Start: 2023-03-15 | End: 2023-03-29

## 2023-03-15 RX ADMIN — Medication 1000 MILLIGRAM(S): at 22:01

## 2023-03-15 RX ADMIN — HYDROMORPHONE HYDROCHLORIDE 0.5 MILLIGRAM(S): 2 INJECTION INTRAMUSCULAR; INTRAVENOUS; SUBCUTANEOUS at 17:12

## 2023-03-15 RX ADMIN — SENNA PLUS 2 TABLET(S): 8.6 TABLET ORAL at 21:12

## 2023-03-15 RX ADMIN — HYDROMORPHONE HYDROCHLORIDE 0.5 MILLIGRAM(S): 2 INJECTION INTRAMUSCULAR; INTRAVENOUS; SUBCUTANEOUS at 22:51

## 2023-03-15 RX ADMIN — Medication 1000 MILLIGRAM(S): at 18:15

## 2023-03-15 RX ADMIN — TRAMADOL HYDROCHLORIDE 50 MILLIGRAM(S): 50 TABLET ORAL at 14:57

## 2023-03-15 RX ADMIN — SODIUM CHLORIDE 500 MILLILITER(S): 9 INJECTION INTRAMUSCULAR; INTRAVENOUS; SUBCUTANEOUS at 13:31

## 2023-03-15 RX ADMIN — Medication 1000 MILLIGRAM(S): at 21:12

## 2023-03-15 RX ADMIN — Medication 400 MILLIGRAM(S): at 13:32

## 2023-03-15 RX ADMIN — SODIUM CHLORIDE 75 MILLILITER(S): 9 INJECTION, SOLUTION INTRAVENOUS at 11:14

## 2023-03-15 RX ADMIN — Medication 100 MILLIGRAM(S): at 22:36

## 2023-03-15 RX ADMIN — TRAMADOL HYDROCHLORIDE 100 MILLIGRAM(S): 50 TABLET ORAL at 21:18

## 2023-03-15 RX ADMIN — POLYETHYLENE GLYCOL 3350 17 GRAM(S): 17 POWDER, FOR SOLUTION ORAL at 21:12

## 2023-03-15 RX ADMIN — HYDROMORPHONE HYDROCHLORIDE 0.5 MILLIGRAM(S): 2 INJECTION INTRAMUSCULAR; INTRAVENOUS; SUBCUTANEOUS at 16:42

## 2023-03-15 RX ADMIN — CELECOXIB 200 MILLIGRAM(S): 200 CAPSULE ORAL at 22:01

## 2023-03-15 RX ADMIN — HYDROMORPHONE HYDROCHLORIDE 0.5 MILLIGRAM(S): 2 INJECTION INTRAMUSCULAR; INTRAVENOUS; SUBCUTANEOUS at 22:36

## 2023-03-15 RX ADMIN — FAMOTIDINE 20 MILLIGRAM(S): 10 INJECTION INTRAVENOUS at 22:32

## 2023-03-15 RX ADMIN — Medication 100 MILLIGRAM(S): at 14:57

## 2023-03-15 RX ADMIN — TRAMADOL HYDROCHLORIDE 100 MILLIGRAM(S): 50 TABLET ORAL at 22:18

## 2023-03-15 RX ADMIN — CHLORHEXIDINE GLUCONATE 1 APPLICATION(S): 213 SOLUTION TOPICAL at 06:17

## 2023-03-15 RX ADMIN — ATORVASTATIN CALCIUM 10 MILLIGRAM(S): 80 TABLET, FILM COATED ORAL at 21:12

## 2023-03-15 RX ADMIN — SODIUM CHLORIDE 100 MILLILITER(S): 9 INJECTION, SOLUTION INTRAVENOUS at 13:31

## 2023-03-15 RX ADMIN — TRAMADOL HYDROCHLORIDE 50 MILLIGRAM(S): 50 TABLET ORAL at 15:27

## 2023-03-15 RX ADMIN — CELECOXIB 200 MILLIGRAM(S): 200 CAPSULE ORAL at 21:12

## 2023-03-15 RX ADMIN — SODIUM CHLORIDE 500 MILLILITER(S): 9 INJECTION INTRAMUSCULAR; INTRAVENOUS; SUBCUTANEOUS at 11:14

## 2023-03-15 RX ADMIN — APREPITANT 40 MILLIGRAM(S): 80 CAPSULE ORAL at 06:18

## 2023-03-15 NOTE — CARE COORDINATION ASSESSMENT. - NSPASTMEDSURGHISTORY_GEN_ALL_CORE_FT
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

## 2023-03-15 NOTE — CARE COORDINATION ASSESSMENT. - NSCAREPROVIDERS_GEN_ALL_CORE_FT
CARE PROVIDERS:  Administration: Maira Berger  Administration: Shabbir Acuña  Admitting: Leyla Sarmiento  Attending: Leyla Sarmiento  Covering Team: Flavio Velásquez  Nurse: Arianna Herrera  Nurse: Andreina Quiroz  Nurse: Neha Leonardo  Nurse: Cristela Lowery  Nurse: Lenore Florence  Nurse: Ekaterina Agudelo  Nurse: Yvette Matos  Override: Andreina Quiroz  Physical Therapy: Steph Fong  Physical Therapy: Kameron Mandujano  Physical Therapy: Karon Godoy  Primary Team: Angela Atwood  Primary Team: Kenrick Dixon  Primary Team: Darrion Perez  Primary Team: Shaina Fernandez  Primary Team: Mani Holman  Primary Team: Debby Crooks  Primary Team: González Ashley  Primary Team: Quan Skinner  : Matilda Green  Team: [facility]IN  Hospitalists, Team  UR// [employer]. Assoc.: Rubi Aguila

## 2023-03-15 NOTE — DISCHARGE NOTE NURSING/CASE MANAGEMENT/SOCIAL WORK - PATIENT PORTAL LINK FT
You can access the FollowMyHealth Patient Portal offered by [facility] by registering at the following website: [email or web address]. By joining [employer]’s FollowMyHealth portal, you will also be able to view your health information using other applications (apps) compatible with our system.

## 2023-03-15 NOTE — DISCHARGE NOTE PROVIDER - NSDCCAREPROVSEEN_GEN_ALL_CORE_FT
Christen, Flavio Skinner, Quan Perez, Darrion Christen, Flavio Skinner, Quan Perez, Darrion Sarmiento, Leyla Christen, Flavio Skinner, Quan Perez, Darrion Sarmiento, David Adan

## 2023-03-15 NOTE — PATIENT PROFILE ADULT - NSPROIMPLANTSMEDDEV_GEN_A_NUR
Continuity of Care Document

                          Created on:2022



Patient:ACOSTA BLACK

Sex:Female

:1956

External Reference #:[id number]





Demographics







                          Address                   [address] [place] [address]

 

                          Home Phone                [phone] [place]

 

                          Work Phone                [phone]

 

                          Preferred Language        Unknown

 

                          Marital Status            Unknown

 

                          Episcopal Affiliation     Unknown

 

                          Race                      Unknown

 

                          Additional Race(s)        Unavailable

 

                          Ethnic Group              Unknown









Author







                          Organization              [facility]

t

 

                          Address                   [address]



                                                    [place] [address]

 

                          Phone                     [phone]









Care Team Providers







                    Name                Role                Phone

 

                    Unavailable         Unavailable         Unavailable









Problems

This patient has no known problems.



Allergies, Adverse Reactions, Alerts

This patient has no known allergies or adverse reactions.



Medications

This patient has no known medications.



Procedures

This patient has no known procedures.



Results







           Test Description Test Time  Test Comments Results    Result     Sourc

e



                                                       Comments   

 

           SCR MAMM   2019             - SCR MAMM BILATERAL            



           BILATERAL SANDRA 10:36:44              SANDRA CAD              



           CAD DIGITAL                       DIGITALBILATERAL DIGITAL           

 



                                            SCREENING MAMMOGRAM            



                                            3D/2D WITH CAD:            



                                            3/8/2019CLINICAL:            



                                            Asymptomatic. Digital            



                                            breast tomosynthesis was            



                                            performed in addition to            



                                            routine CC and MLO            



                                            views. Current            



                                            mammographic images were            



                                            evaluated by either a            



                                            [facility] or a [employer]            



                                            ImageChecker CAD            



                                            (computer aided            



                                            detection system).            



                                            Comparison is made to            



                                            exams dated 2017            



                                            mammogram - [facility]            



                                            [facility],            



                                            2013 mammogram,            



                                            and 2011 mammogram            



                                            - [facility] [place] [facility]            



                                            [facility]. [facility]. There are            



                                            scattered fibroglandular            



                                            tissues in both breasts.            



                                            There are benign            



                                            calcifications in both            



                                            breasts. There also is a            



                                            stable benign-appearing            



                                            asymmetry in the            



                                            posterior right breast.            



                                            Additionally, there are            



                                            post operative findings            



                                            in the left breast. No            



                                            suspicious new mass, new            



                                            focus of architectural            



                                            distortion, malignant            



                                            type calcification, or            



                                            lymph node abnormality            



                                            detected. Breast            



                                            architecture is stable            



                                            compared to prior            



                                            exams.IMPRESSION:            



                                            BENIGNThere is no            



                                            mammographic evidence of            



                                            malignancy. Resume            



                                            annual screening            



                                            mammography in one year.            



                                            Chinmay Murcia M.D.            



                                            rb/:3/14/2019 10:36:44            



                                            Attending Technologist:            



                                            Racheal Resendez MM, [facility]            



                                            [facility]            



                                            [facility]            



                                            Technologist: Vanda Bazzi MM, [facility]            



                                            Mammographyletter sent:            



                                            BIRADS 1-2 Normal            



                                            Mammogram BI-RADS: 2            



                                            Benign
Artificial joint

## 2023-03-15 NOTE — CONSULT NOTE ADULT - ASSESSMENT
71 yo female with history of HLD, factor V leiden, HTN, OA who presents for elective L hip surgery and THR.    #s/p L hip surgery  #HLD  - on atorvastatin - can resume once able to take PO  #[place] HTN  - monitor BP while here    #DVT ppx - start when ready by ortho  Dispo: needs PT assessment 73 yo female with history of HLD, factor V leiden, HTN, OA who presents for elective L hip surgery and THR.    #s/p L hip surgery  - pain control as per ortho  - bowel regimen  - PT when ready  - advance diet as tolerates  - DVT ppx as per ortho    #HLD  - on atorvastatin - can resume once able to take PO    #[place] HTN  - monitor BP while here    #hx of Factor V leiden  - patient is a heterozygous carrier and can undergo routine dose of DVT ppx as indicated    #DVT ppx - start when ready by ortho  Dispo: needs PT assessment

## 2023-03-15 NOTE — DISCHARGE NOTE PROVIDER - NSDCCPTREATMENT_GEN_ALL_CORE_FT
PRINCIPAL PROCEDURE  Procedure: Total left hip arthroplasty  Findings and Treatment: Severe DJD left hip         PRINCIPAL PROCEDURE  Procedure: Total replacement of left hip joint by anterior approach  Findings and Treatment: Severe DJD left hip

## 2023-03-15 NOTE — CONSULT NOTE ADULT - SUBJECTIVE AND OBJECTIVE BOX
CC: left hip pain    HPI: 71 yo female with history of HLD, factor V leiden, HTN, OA who presents for elective L hip surgery and THR. Received 1/3 doses of cefazolin prior to surgery. 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    S/P LASIK surgery of both eyes  1997    H/O cosmetic surgery  face lift 2021, had hematology clearance at that    History of total right knee replacement  2020    History of left knee replacement    Social History:  Tabacco -   ETOH -   Illicit drug abuse - denies    FAMILY HISTORY:  Family history of factor V Leiden mutation (Father, Sibling)    Family hx of prostate cancer (Father)    Family history of osteoarthritis (Father, Sibling)    Family history of congestive heart failure (Father)    Family history of bladder cancer (Mother)    Family history of heart attack (Mother)    Allergies    codeine (Hives; Anaphylaxis)  fentanyl (Pruritus; Urticaria)  iodine (Anaphylaxis)    Intolerances    opioid-like analgesics (Rash)    HOME MEDICATIONS :   atorvastatin 10 mg oral tablet: 1 tab(s) orally once a day (15 Mar 2023 06:38)    REVIEW OF SYSTEMS:    CONSTITUTIONAL: No fever, weight loss, or fatigue  EYES: No eye pain, visual disturbances, or discharge  NECK: No pain or stiffness  RESPIRATORY: No cough, wheezing, chills or hemoptysis; No shortness of breath  CARDIOVASCULAR: No chest pain, palpitations, dizziness, or leg swelling  GASTROINTESTINAL: No abdominal or epigastric pain. No nausea, vomiting, or hematemesis; No diarrhea or constipation. No melena or hematochezia.  GENITOURINARY: No dysuria, frequency, hematuria, or incontinence  NEUROLOGICAL: No headaches, memory loss, loss of strength, numbness, or tremors  SKIN: No itching, burning, rashes, or lesions   LYMPH NODES: No enlarged glands  ENDOCRINE: No heat or cold intolerance; No hair loss  MUSCULOSKELETAL:   PSYCHIATRIC: No depression, anxiety, mood swings, or difficulty sleeping  HEME/LYMPH: No easy bruising, or bleeding gums  ALLERGY AND IMMUNOLOGIC: No hives or eczema    MEDICATIONS  (STANDING):  lactated ringers. 1000 milliLiter(s) (75 mL/Hr) IV Continuous <Continuous>  sodium chloride 0.9% Bolus 500 milliLiter(s) IV Bolus once    MEDICATIONS  (PRN):  HYDROmorphone  Injectable 0.5 milliGRAM(s) IV Push every 10 minutes PRN Moderate Pain (4 - 6)  HYDROmorphone  Injectable 1 milliGRAM(s) IV Push every 10 minutes PRN Severe Pain (7 - 10)  ondansetron Injectable 4 milliGRAM(s) IV Push once PRN Nausea and/or Vomiting    Vital Signs Last 24 Hrs  T(C): 36.4 (15 Mar 2023 10:06), Max: 36.9 (15 Mar 2023 06:27)  T(F): 97.5 (15 Mar 2023 10:06), Max: 98.4 (15 Mar 2023 06:27)  HR: 81 (15 Mar 2023 10:21) (76 - 97)  BP: 97/47 (15 Mar 2023 10:21) (96/47 - 133/71)  BP(mean): --  RR: 20 (15 Mar 2023 10:21) (14 - 76)  SpO2: 98% (15 Mar 2023 10:21) (97% - 100%)    Parameters below as of 15 Mar 2023 10:21  Patient On (Oxygen Delivery Method): nasal cannula  O2 Flow (L/min): 3    PHYSICAL EXAM:    GENERAL: NAD, well-groomed, well-developed  HEAD:  Atraumatic, Normocephalic  EYES: EOMI, PERRLA, conjunctiva and sclera clear  NECK: Supple, No JVD, Normal thyroid  NERVOUS SYSTEM:  Alert & Oriented X3, Good concentration; Motor Strength 5/5 B/L upper and lower extremities; DTRs 2+ intact and symmetric  CHEST/LUNG: CTA  b/l,  no rales, rhonchi, wheezing, or rubs  HEART: Regular rate and rhythm; No murmurs, rubs, or gallops  ABDOMEN: Soft, Nontender, Nondistended; Bowel sounds present  EXTREMITIES:  2+ Peripheral Pulses, No clubbing, cyanosis, or edema ,   LYMPH: No lymphadenopathy noted  SKIN: No rashes or lesions    LABS:    RADIOLOGY & ADDITIONAL STUDIES:     CC: left hip pain    HPI: 71 yo female with history of HLD, factor V leiden, HTN, OA who presents for elective L hip surgery and THR. Received 1/3 doses of cefazolin prior to surgery. At this time feels well and is eager to eat. She explains that she was tested for Factor V leiden after her sister tested positive (not in the setting a new clot), since then she has been known to be heterozygous for factor v leiden but has no other follow up regarding this and is not on medication for this. She has never had a spontaneous or provoked clotting event.    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    S/P LASIK surgery of both eyes  1997    H/O cosmetic surgery  face lift 2021, had hematology clearance at that    History of total right knee replacement  2020    History of left knee replacement    Social History:  Tabacco -   ETOH -   Illicit drug abuse - denies    FAMILY HISTORY:  Family history of factor V Leiden mutation (Father, Sibling)    Family hx of prostate cancer (Father)    Family history of osteoarthritis (Father, Sibling)    Family history of congestive heart failure (Father)    Family history of bladder cancer (Mother)    Family history of heart attack (Mother)    Allergies    codeine (Hives; Anaphylaxis)  fentanyl (Pruritus; Urticaria)  iodine (Anaphylaxis)    Intolerances    opioid-like analgesics (Rash)    HOME MEDICATIONS :   atorvastatin 10 mg oral tablet: 1 tab(s) orally once a day (15 Mar 2023 06:38)    REVIEW OF SYSTEMS:    CONSTITUTIONAL: No fever, weight loss, or fatigue  EYES: No eye pain, visual disturbances, or discharge  NECK: No pain or stiffness  RESPIRATORY: No cough, wheezing, chills or hemoptysis; No shortness of breath  CARDIOVASCULAR: No chest pain, palpitations, dizziness, or leg swelling  GASTROINTESTINAL: No abdominal or epigastric pain. No nausea, vomiting, or hematemesis; No diarrhea or constipation. No melena or hematochezia.  GENITOURINARY: No dysuria, frequency, hematuria, or incontinence  NEUROLOGICAL: No headaches, memory loss, loss of strength, numbness, or tremors  SKIN: No itching, burning, rashes, or lesions   LYMPH NODES: No enlarged glands  ENDOCRINE: No heat or cold intolerance; No hair loss  MUSCULOSKELETAL:   PSYCHIATRIC: No depression, anxiety, mood swings, or difficulty sleeping  HEME/LYMPH: No easy bruising, or bleeding gums  ALLERGY AND IMMUNOLOGIC: No hives or eczema    MEDICATIONS  (STANDING):  lactated ringers. 1000 milliLiter(s) (75 mL/Hr) IV Continuous <Continuous>  sodium chloride 0.9% Bolus 500 milliLiter(s) IV Bolus once    MEDICATIONS  (PRN):  HYDROmorphone  Injectable 0.5 milliGRAM(s) IV Push every 10 minutes PRN Moderate Pain (4 - 6)  HYDROmorphone  Injectable 1 milliGRAM(s) IV Push every 10 minutes PRN Severe Pain (7 - 10)  ondansetron Injectable 4 milliGRAM(s) IV Push once PRN Nausea and/or Vomiting    Vital Signs Last 24 Hrs  T(C): 36.4 (15 Mar 2023 10:06), Max: 36.9 (15 Mar 2023 06:27)  T(F): 97.5 (15 Mar 2023 10:06), Max: 98.4 (15 Mar 2023 06:27)  HR: 81 (15 Mar 2023 10:21) (76 - 97)  BP: 97/47 (15 Mar 2023 10:21) (96/47 - 133/71)  BP(mean): --  RR: 20 (15 Mar 2023 10:21) (14 - 76)  SpO2: 98% (15 Mar 2023 10:21) (97% - 100%)    Parameters below as of 15 Mar 2023 10:21  Patient On (Oxygen Delivery Method): nasal cannula  O2 Flow (L/min): 3    PHYSICAL EXAM:    GENERAL: NAD, well-groomed, well-developed  HEAD:  Atraumatic, Normocephalic  EYES: EOMI, PERRLA, conjunctiva and sclera clear  NECK: Supple, No JVD, Normal thyroid  NERVOUS SYSTEM:  Alert & Oriented X3, Good concentration; Motor Strength 5/5 B/L upper and lower extremities; DTRs 2+ intact and symmetric  CHEST/LUNG: CTA  b/l,  no rales, rhonchi, wheezing, or rubs  HEART: Regular rate and rhythm; No murmurs, rubs, or gallops  ABDOMEN: Soft, Nontender, Nondistended; Bowel sounds present  EXTREMITIES:  2+ Peripheral Pulses, at time not able to move her lower ext aside from weak effort of moving her toes, sensation symmetrical  LYMPH: No lymphadenopathy noted  SKIN: No rashes or lesions    LABS:    RADIOLOGY & ADDITIONAL STUDIES:

## 2023-03-15 NOTE — PATIENT PROFILE ADULT - FALL HARM RISK - UNIVERSAL INTERVENTIONS
Bed in lowest position, wheels locked, appropriate side rails in place/Call bell, personal items and telephone in reach/Instruct patient to call for assistance before getting out of bed or chair/Non-slip footwear when patient is out of bed/[place] to call system/Physically safe environment - no spills, clutter or unnecessary equipment/Purposeful Proactive Rounding/Room/bathroom lighting operational, light cord in reach

## 2023-03-15 NOTE — PHYSICAL THERAPY INITIAL EVALUATION ADULT - ACTIVE RANGE OF MOTION EXAMINATION, REHAB EVAL
Left hip decreased due to pain/eri. upper extremity Active ROM was WNL (within normal limits)/RLE Active ROM was WNL (within normal limits)/deficits as listed below

## 2023-03-15 NOTE — PHYSICAL THERAPY INITIAL EVALUATION ADULT - MD ORDER
OOB WBAT left LE. PT Anterior hip precautions:No hyperextension,no extreme external rotation, no extreme abduction.
none

## 2023-03-15 NOTE — PHYSICAL THERAPY INITIAL EVALUATION ADULT - PATIENT/FAMILY/SIGNIFICANT OTHER GOALS STATEMENT, PT EVAL
Wants to go home Z Plasty Text: The lesion was extirpated to the level of the fat with a #15 scalpel blade.  Given the location of the defect, shape of the defect and the proximity to free margins a Z-plasty was deemed most appropriate for repair.  Using a sterile surgical marker, the appropriate transposition arms of the Z-plasty were drawn incorporating the defect and placing the expected incisions within the relaxed skin tension lines where possible.    The area thus outlined was incised deep to adipose tissue with a #15 scalpel blade.  The skin margins were undermined to an appropriate distance in all directions utilizing iris scissors.  The opposing transposition arms were then transposed into place in opposite direction and anchored with interrupted buried subcutaneous sutures.

## 2023-03-15 NOTE — DISCHARGE NOTE PROVIDER - NSDCFUSCHEDAPPT_GEN_ALL_CORE_FT
Ileana Herman  [facility]  ORTHOSURG [address] [facility]  Scheduled Appointment: 03/30/2023    Leyla Sarmiento  [facility]  ORTHOR[address]  Scheduled Appointment: 05/02/2023

## 2023-03-15 NOTE — PATIENT CHOICE NOTE. - NSPTCHOICESTATE_GEN_ALL_CORE

## 2023-03-15 NOTE — PHYSICAL THERAPY INITIAL EVALUATION ADULT - PERTINENT HX OF CURRENT PROBLEM, REHAB EVAL
71 yo female reports stiffness left hip and pain with exercise.  She is scheduled for left total anterior hip replacement on 3/15/2023 @ [facility].

## 2023-03-15 NOTE — DISCHARGE NOTE NURSING/CASE MANAGEMENT/SOCIAL WORK - NSDCPEFALRISK_GEN_ALL_CORE
For information on Fall & Injury Prevention, visit: [email or web address] OR  [email or web address] OR  https://www.cdc.gov/steadi/patient.html

## 2023-03-15 NOTE — DISCHARGE NOTE NURSING/CASE MANAGEMENT/SOCIAL WORK - NSSCNAMETXT_GEN_ALL_CORE
[facility] care agency [phone] will reach out to you within 24-72 hours of your discharge to schedule home care visit/eval appointment with you. Please call agency for any queries regarding home care services

## 2023-03-15 NOTE — DISCHARGE NOTE PROVIDER - NSDCACTIVITY_GEN_ALL_CORE
Do not drive or operate machinery/Showering allowed/No heavy lifting/straining Do not drive or operate machinery/Showering allowed/Stairs allowed/Walking - Indoors allowed/No heavy lifting/straining/Walking - Outdoors allowed/Follow Instructions Provided by your Surgical Team

## 2023-03-15 NOTE — DISCHARGE NOTE PROVIDER - NSDCCPGOAL_GEN_ALL_CORE_FT
To get better and follow your care plan as instructed. To get better and follow your care plan as instructed.  Improve ambulation, ADLs and quality of life.

## 2023-03-15 NOTE — DISCHARGE NOTE PROVIDER - PROVIDER TOKENS
PROVIDER:[TOKEN:[[id number]],SCHEDULEDAPPT:[03/30/2023],SCHEDULEDAPPTTIME:[09:30 AM],ESTABLISHEDPATIENT:[T]]

## 2023-03-15 NOTE — DISCHARGE NOTE PROVIDER - CARE PROVIDER_API CALL
Leyla Sarmiento)  Orthopedics  [address] [facility] [address], [address]  [place] [address]  Phone: [phone]  Fax: [phone]  Established Patient  Scheduled Appointment: 03/30/2023 09:30 AM

## 2023-03-15 NOTE — PHYSICAL THERAPY INITIAL EVALUATION ADULT - ADDITIONAL COMMENTS
Pt  lives alone in house with 5 x 4 steps  to enter with handrail, 1 flight steps  inside with handrail to bedroom. Owns DME- shower chair, RWs,crutches,cane. Pt with hx of multiple joint replacements. upon d/c from hospital after TKR pt hired a HHA to assist. Pt states she intends to try to hire the same HHA this time to assist at home upon d/c.

## 2023-03-15 NOTE — DISCHARGE NOTE PROVIDER - NSDCMRMEDTOKEN_GEN_ALL_CORE_FT
acetaminophen 500 mg oral tablet: 2 tab(s) orally every 8 hours  apixaban 2.5 mg oral tablet: 1 tab(s) orally every 12 hours  atorvastatin 10 mg oral tablet: 1 tab(s) orally once a day  celecoxib 200 mg oral capsule: 1 cap(s) orally every 12 hours  omeprazole 40 mg oral delayed release capsule: 1 cap(s) orally once a day    acetaminophen 500 mg oral tablet: 2 tab(s) orally every 8 hours  apixaban 2.5 mg oral tablet: 1 tab(s) orally every 12 hours  atorvastatin 10 mg oral tablet: 1 tab(s) orally once a day  celecoxib 200 mg oral capsule: 1 cap(s) orally every 12 hours  omeprazole 20 mg oral delayed release tablet: 1 tab(s) orally once a day   polyethylene glycol 3350 oral powder for reconstitution: 17 gram(s) orally once a day (at bedtime)  senna leaf extract oral tablet: 2 tab(s) orally once a day (at bedtime)  traMADol 50 mg oral tablet: 1-2  tab(s) orally every 4 hours, As Needed -for moderate to severe  pain MDD:6   ref # [id number]   acetaminophen 500 mg oral tablet: 2 tab(s) orally every 8 hours  apixaban 2.5 mg oral tablet: 1 tab(s) orally every 12 hours  atorvastatin 10 mg oral tablet: 1 tab(s) orally once a day  celecoxib 200 mg oral capsule: 1 cap(s) orally every 12 hours  famotidine 40 mg oral tablet: 1 tab(s) orally 2 times a day   polyethylene glycol 3350 oral powder for reconstitution: 17 gram(s) orally once a day (at bedtime)  senna leaf extract oral tablet: 2 tab(s) orally once a day (at bedtime)  traMADol 50 mg oral tablet: 1-2  tab(s) orally every 4 hours, As Needed -for moderate to severe  pain MDD:6   ref # [id number]   acetaminophen 500 mg oral tablet: 2 tab(s) orally every 8 hours  apixaban 2.5 mg oral tablet: 1 tab(s) orally every 12 hours  atorvastatin 10 mg oral tablet: 1 tab(s) orally once a day  celecoxib 200 mg oral capsule: 1 cap(s) orally every 12 hours  famotidine 40 mg oral tablet: 1 tab(s) orally 2 times a day   Narcan 4 mg/0.1 mL nasal spray: instill one spray into nostril as needed for drug overdose. May repeat dose in alternate nostril if needed in 2-3min while awaiting EMS.  polyethylene glycol 3350 oral powder for reconstitution: 17 gram(s) orally once a day (at bedtime)  senna leaf extract oral tablet: 2 tab(s) orally once a day (at bedtime)  traMADol 50 mg oral tablet: 1-2  tab(s) orally every 4 hours, As Needed -for moderate to severe  pain MDD:6   ref # [id number]

## 2023-03-15 NOTE — DISCHARGE NOTE PROVIDER - NSDCCPCAREPLAN_GEN_ALL_CORE_FT
PRINCIPAL DISCHARGE DIAGNOSIS  Diagnosis: Osteoarthritis of left hip  Assessment and Plan of Treatment: For your total hip replacement:  Physical Therapy/Occupational Therapy for: ambulation, transfers, stairs, ADL's (activities of daily living), range of motion exercises, and isometrics  -Activity  • Weight Bearing as tolerated with rolling walker.  • Take short, frequent walks increasing the distance that you walk each day as tolerated.  • Change your position every hour to decrease pain and stiffness.  • Continue the exercises taught to you by your physical therapist.  • No driving until cleared by the doctor.  • No tub baths, hot tubs, or swimming pools until instructed by your doctor.  • Do not squat down on the floor.  • Do not kneel or twist your knee.  Keep incision clean. DO NOT APPLY ANYTHING to incision site (salves/ointments/creams).  May shower post-op if no drainage from   Do not scrub incision site. Pat dry after shower.  Suture/Prineo tape removal 2 weeks after surgery at Surgeon's office.  Keep incision clean. DO NOT APPLY ANYTHING to incision site (salves/ointments/creams). Do not scrub incision site. Pat dry after shower.  Suture/Prineo removal 2 weeks after surgery at Surgeon's office.         PRINCIPAL DISCHARGE DIAGNOSIS  Diagnosis: Osteoarthritis of left hip  Assessment and Plan of Treatment: You have had an Anterior Total Hip Replacement. Follow instructions given to you by your surgeon.   PT/OT Total Hip Protocol; Ambulation, transfers, stairs, & ADLs  Full weight bearing both legs; Walker/cane use as instructed by PT/OT  Anterior THR precautions for 4-8 weeks (cleared by surgeon): No straight leg raise; No external rotation of hip when extended-standing or lying flat; No hyperextension of hip when standing (kickback)  • Ice 30 minutes several times daily with at least a 20 minute break in between icing sessions  Mepilex [place] dressing is over your surgial incision.  It is waterproof and you may shower.  Do not aim shower stream at surgical site and pat dry with clean towel after showering.   Remove Mepilex dressing 7 days after surgery and leave wound open to air.   Keep incision clean. DO NOT APPLY ANYTHING to incision site (salves/ointments/creams).  May shower post-op if no drainage from incision.  Do not scrub incision site. Pat dry after shower.  Prineo tape removal on/near after Post Op Day # 14.  See PCP in office approximately 2-3 weeks from discharge for exam/labwork.

## 2023-03-15 NOTE — DISCHARGE NOTE PROVIDER - NSDCFUADDINST_GEN_ALL_CORE_FT
Pain medicine has been prescribed for you, as needed, and it often causes constipation.  Take docusate sodium (Colace) 100mg 3x daily, while taking narcotic pain medication.   For Constipation :   • Increase your water intake. Drink at least 8 glasses of water daily.  • Try adding fiber to your diet by eating fruits, vegetables and foods that are rich in grains.  • If you do experience constipation, you may take an over-the-counter laxative such as, Senokot, Miralax or  Milk of Magnesia.    - Call your doctor if you experience:  • An increase in pain not controlled by pain medication or change in activity or  position.  • Temperature greater than 101° F.  • Redness, increased swelling or foul smelling drainage from or around the  incision.  • Numbness, tingling or a change in color or temperature of the operative leg.  • Call your doctor immediately if you experience chest pain, shortness of breath or calf pain.

## 2023-03-16 LAB
ANION GAP SERPL CALC-SCNC: 6 MMOL/L — SIGNIFICANT CHANGE UP (ref 5–17)
BUN SERPL-MCNC: 17 MG/DL — SIGNIFICANT CHANGE UP (ref 7–23)
CALCIUM SERPL-MCNC: 8.5 MG/DL — SIGNIFICANT CHANGE UP (ref 8.4–10.5)
CHLORIDE SERPL-SCNC: 104 MMOL/L — SIGNIFICANT CHANGE UP (ref 96–108)
CO2 SERPL-SCNC: 29 MMOL/L — SIGNIFICANT CHANGE UP (ref 22–31)
CREAT SERPL-MCNC: 0.76 MG/DL — SIGNIFICANT CHANGE UP (ref 0.5–1.3)
EGFR: 83 ML/MIN/1.73M2 — SIGNIFICANT CHANGE UP
GLUCOSE SERPL-MCNC: 98 MG/DL — SIGNIFICANT CHANGE UP (ref 70–99)
HCT VFR BLD CALC: 30.8 % — LOW (ref 34.5–45)
HGB BLD-MCNC: 10.3 G/DL — LOW (ref 11.5–15.5)
MCHC RBC-ENTMCNC: 32 PG — SIGNIFICANT CHANGE UP (ref 27–34)
MCHC RBC-ENTMCNC: 33.4 GM/DL — SIGNIFICANT CHANGE UP (ref 32–36)
MCV RBC AUTO: 95.7 FL — SIGNIFICANT CHANGE UP (ref 80–100)
NRBC # BLD: 0 /100 WBCS — SIGNIFICANT CHANGE UP (ref 0–0)
PLATELET # BLD AUTO: 148 K/UL — LOW (ref 150–400)
POTASSIUM SERPL-MCNC: 3.9 MMOL/L — SIGNIFICANT CHANGE UP (ref 3.5–5.3)
POTASSIUM SERPL-SCNC: 3.9 MMOL/L — SIGNIFICANT CHANGE UP (ref 3.5–5.3)
RBC # BLD: 3.22 M/UL — LOW (ref 3.8–5.2)
RBC # FLD: 11.9 % — SIGNIFICANT CHANGE UP (ref 10.3–14.5)
SODIUM SERPL-SCNC: 139 MMOL/L — SIGNIFICANT CHANGE UP (ref 135–145)
WBC # BLD: 6.9 K/UL — SIGNIFICANT CHANGE UP (ref 3.8–10.5)
WBC # FLD AUTO: 6.9 K/UL — SIGNIFICANT CHANGE UP (ref 3.8–10.5)

## 2023-03-16 PROCEDURE — 73630 X-RAY EXAM OF FOOT: CPT | Mod: 26,LT

## 2023-03-16 PROCEDURE — 99232 SBSQ HOSP IP/OBS MODERATE 35: CPT

## 2023-03-16 RX ORDER — SENNA PLUS 8.6 MG/1
2 TABLET ORAL
Qty: 0 | Refills: 0 | DISCHARGE
Start: 2023-03-16

## 2023-03-16 RX ORDER — POLYETHYLENE GLYCOL 3350 17 G/17G
17 POWDER, FOR SOLUTION ORAL
Qty: 0 | Refills: 0 | DISCHARGE
Start: 2023-03-16

## 2023-03-16 RX ORDER — FAMOTIDINE 10 MG/ML
1 INJECTION INTRAVENOUS
Qty: 60 | Refills: 0
Start: 2023-03-16 | End: 2023-04-14

## 2023-03-16 RX ORDER — ZOLPIDEM TARTRATE 10 MG/1
5 TABLET ORAL AT BEDTIME
Refills: 0 | Status: DISCONTINUED | OUTPATIENT
Start: 2023-03-16 | End: 2023-03-17

## 2023-03-16 RX ORDER — TRAMADOL HYDROCHLORIDE 50 MG/1
1 TABLET ORAL
Qty: 42 | Refills: 0
Start: 2023-03-16 | End: 2023-03-22

## 2023-03-16 RX ORDER — LANOLIN ALCOHOL/MO/W.PET/CERES
3 CREAM (GRAM) TOPICAL ONCE
Refills: 0 | Status: COMPLETED | OUTPATIENT
Start: 2023-03-16 | End: 2023-03-16

## 2023-03-16 RX ADMIN — CELECOXIB 200 MILLIGRAM(S): 200 CAPSULE ORAL at 21:43

## 2023-03-16 RX ADMIN — TRAMADOL HYDROCHLORIDE 50 MILLIGRAM(S): 50 TABLET ORAL at 15:00

## 2023-03-16 RX ADMIN — POLYETHYLENE GLYCOL 3350 17 GRAM(S): 17 POWDER, FOR SOLUTION ORAL at 21:44

## 2023-03-16 RX ADMIN — Medication 101.6 MILLIGRAM(S): at 05:31

## 2023-03-16 RX ADMIN — Medication 1000 MILLIGRAM(S): at 14:00

## 2023-03-16 RX ADMIN — Medication 1000 MILLIGRAM(S): at 05:31

## 2023-03-16 RX ADMIN — CELECOXIB 200 MILLIGRAM(S): 200 CAPSULE ORAL at 08:40

## 2023-03-16 RX ADMIN — Medication 1000 MILLIGRAM(S): at 21:44

## 2023-03-16 RX ADMIN — APIXABAN 2.5 MILLIGRAM(S): 2.5 TABLET, FILM COATED ORAL at 21:44

## 2023-03-16 RX ADMIN — FAMOTIDINE 20 MILLIGRAM(S): 10 INJECTION INTRAVENOUS at 21:44

## 2023-03-16 RX ADMIN — Medication 1000 MILLIGRAM(S): at 13:42

## 2023-03-16 RX ADMIN — Medication 1000 MILLIGRAM(S): at 21:56

## 2023-03-16 RX ADMIN — APIXABAN 2.5 MILLIGRAM(S): 2.5 TABLET, FILM COATED ORAL at 08:41

## 2023-03-16 RX ADMIN — CELECOXIB 200 MILLIGRAM(S): 200 CAPSULE ORAL at 08:42

## 2023-03-16 RX ADMIN — CELECOXIB 200 MILLIGRAM(S): 200 CAPSULE ORAL at 21:56

## 2023-03-16 RX ADMIN — SENNA PLUS 2 TABLET(S): 8.6 TABLET ORAL at 21:44

## 2023-03-16 RX ADMIN — SODIUM CHLORIDE 100 MILLILITER(S): 9 INJECTION, SOLUTION INTRAVENOUS at 05:41

## 2023-03-16 RX ADMIN — ZOLPIDEM TARTRATE 5 MILLIGRAM(S): 10 TABLET ORAL at 21:51

## 2023-03-16 RX ADMIN — Medication 3 MILLIGRAM(S): at 01:43

## 2023-03-16 RX ADMIN — Medication 1000 MILLIGRAM(S): at 05:41

## 2023-03-16 RX ADMIN — TRAMADOL HYDROCHLORIDE 50 MILLIGRAM(S): 50 TABLET ORAL at 20:00

## 2023-03-16 RX ADMIN — FAMOTIDINE 20 MILLIGRAM(S): 10 INJECTION INTRAVENOUS at 05:31

## 2023-03-16 RX ADMIN — TRAMADOL HYDROCHLORIDE 50 MILLIGRAM(S): 50 TABLET ORAL at 14:13

## 2023-03-16 RX ADMIN — TRAMADOL HYDROCHLORIDE 50 MILLIGRAM(S): 50 TABLET ORAL at 19:15

## 2023-03-16 RX ADMIN — MAGNESIUM HYDROXIDE 30 MILLILITER(S): 400 TABLET, CHEWABLE ORAL at 09:47

## 2023-03-16 RX ADMIN — ATORVASTATIN CALCIUM 10 MILLIGRAM(S): 80 TABLET, FILM COATED ORAL at 21:44

## 2023-03-16 NOTE — PHARMACOTHERAPY INTERVENTION NOTE - COMMENTS
Admission medication reconciliation POD1 
Transition of Care video discharge education - medication calendar given to patient

## 2023-03-16 NOTE — OCCUPATIONAL THERAPY INITIAL EVALUATION ADULT - NSOTDISCHREC_GEN_A_CORE
Supervision/assist with functional activities prn which pt states [facility] will provide./No skilled OT needs

## 2023-03-17 VITALS
RESPIRATION RATE: 20 BRPM | HEART RATE: 79 BPM | TEMPERATURE: 98 F | DIASTOLIC BLOOD PRESSURE: 61 MMHG | OXYGEN SATURATION: 97 % | SYSTOLIC BLOOD PRESSURE: 98 MMHG

## 2023-03-17 PROCEDURE — C1776: CPT

## 2023-03-17 PROCEDURE — 97116 GAIT TRAINING THERAPY: CPT

## 2023-03-17 PROCEDURE — 97161 PT EVAL LOW COMPLEX 20 MIN: CPT

## 2023-03-17 PROCEDURE — 36415 COLL VENOUS BLD VENIPUNCTURE: CPT

## 2023-03-17 PROCEDURE — 73630 X-RAY EXAM OF FOOT: CPT

## 2023-03-17 PROCEDURE — 97165 OT EVAL LOW COMPLEX 30 MIN: CPT

## 2023-03-17 PROCEDURE — 97535 SELF CARE MNGMENT TRAINING: CPT

## 2023-03-17 PROCEDURE — 76000 FLUOROSCOPY <1 HR PHYS/QHP: CPT

## 2023-03-17 PROCEDURE — 99232 SBSQ HOSP IP/OBS MODERATE 35: CPT

## 2023-03-17 PROCEDURE — C1889: CPT

## 2023-03-17 PROCEDURE — 97530 THERAPEUTIC ACTIVITIES: CPT

## 2023-03-17 PROCEDURE — 85027 COMPLETE CBC AUTOMATED: CPT

## 2023-03-17 PROCEDURE — 80048 BASIC METABOLIC PNL TOTAL CA: CPT

## 2023-03-17 PROCEDURE — C1713: CPT

## 2023-03-17 PROCEDURE — 97110 THERAPEUTIC EXERCISES: CPT

## 2023-03-17 RX ORDER — NALOXONE HYDROCHLORIDE 4 MG/.1ML
4 SPRAY NASAL
Qty: 1 | Refills: 0
Start: 2023-03-17 | End: 2023-03-17

## 2023-03-17 RX ADMIN — TRAMADOL HYDROCHLORIDE 50 MILLIGRAM(S): 50 TABLET ORAL at 09:15

## 2023-03-17 RX ADMIN — CELECOXIB 200 MILLIGRAM(S): 200 CAPSULE ORAL at 08:20

## 2023-03-17 RX ADMIN — Medication 1000 MILLIGRAM(S): at 07:30

## 2023-03-17 RX ADMIN — TRAMADOL HYDROCHLORIDE 50 MILLIGRAM(S): 50 TABLET ORAL at 08:35

## 2023-03-17 RX ADMIN — CELECOXIB 200 MILLIGRAM(S): 200 CAPSULE ORAL at 08:23

## 2023-03-17 RX ADMIN — FAMOTIDINE 20 MILLIGRAM(S): 10 INJECTION INTRAVENOUS at 08:21

## 2023-03-17 RX ADMIN — APIXABAN 2.5 MILLIGRAM(S): 2.5 TABLET, FILM COATED ORAL at 08:21

## 2023-03-17 RX ADMIN — Medication 1000 MILLIGRAM(S): at 07:04

## 2023-03-17 NOTE — PROGRESS NOTE ADULT - ASSESSMENT
71 yo female with history of HLD, factor V leiden, HTN, OA who presents for elective Left THR.    #s/p Left THR  - pain control as per ortho  - bowel regimen  - PT/OT  - DVT ppx: high risk on Eliquis    #HLD  - continue lipitor    #[place] HTN  - monitor BP while here    DC plan - home w HC tomorrow as [facility] will be available
73 yo female with history of HLD, factor V leiden, HTN, OA who presents for elective Left THR.    #s/p Left THR  - pain control as per ortho  - bowel regimen  - PT/OT  - DVT ppx: high risk on Eliquis    #HLD  - continue lipitor    #[place] HTN  - monitor BP while here    DC plan - home w HC today as [facility] will be available

## 2023-03-17 NOTE — PROGRESS NOTE ADULT - SUBJECTIVE AND OBJECTIVE BOX
ORTHOPEDIC PA PROGRESS NOTE  OLEGEMILY LEO      72y Female                                                                                                                               POD #    1    STATUS POST:               Pre-Op Dx: Osteoarthritis of left hip      Post-Op Dx:  Osteoarthritis of left hip      Procedure: Total left hip arthroplasty                                                    Current Pain Management:  Po Analgesics  T(F): 97.7  HR: 58  BP: 96/57  RR: 15  SpO2: 96%                        10.3   6.90  )-----------( 148      ( 16 Mar 2023 06:00 )             30.8                     03-16    139  |  104  |  17  ----------------------------<  98  3.9   |  29  |  0.76    Ca    8.5      16 Mar 2023 06:00      Physical Exam :    -   Dressing  C/D/I.   -   Distal Neurvascular status intact grossly.   -   Warm well perfused; capillary refill <3 seconds   -   (+)EHL/FHL 5/5  -   (+) Sensation to light touch  -   (-) Calf tenderness Bilaterally    A/P: 72y Female s/p Osteoarthritis of left hip      -   Ortho Stable  -   Pain control   -   Medicine to follow  -   DVT ppx:     Eliquis       -   Weight bearing status:  WBAT    -  Dispo:     Home today when clears pt and medicine
Orthopaedic Post Op Note    Procedure: Left Anterior THR  Surgeon: Leyla Sarmiento     72y Female comfortable, without complaints. Has minimal discomfort. Has not yet been up with PT. Patient reports a h/o left 1st metatarsal pain when ambulating for a while now. She states she thinks it is from an altered gait. She is concerned that she might have a stress fracture but was reluctant to share this information. Reported pain score = 2  Denies N/V, CP, SOB, numbness/tingling of extremities.    PE:  Vital Signs Last 24 Hrs  T(C): 36.6 (15 Mar 2023 12:52), Max: 36.9 (15 Mar 2023 06:27)  T(F): 97.8 (15 Mar 2023 12:52), Max: 98.4 (15 Mar 2023 06:27)  HR: 80 (15 Mar 2023 12:52) (76 - 97)  BP: 92/41 (15 Mar 2023 12:52) (92/41 - 133/71)  RR: 17 (15 Mar 2023 12:52) (14 - 76)  SpO2: 98% (15 Mar 2023 12:52) (95% - 100%)    Parameters below as of 15 Mar 2023 12:52  Patient On (Oxygen Delivery Method): room air      General: Pt alert and oriented   Left Hip Mepilex Dressing: C/D/I   + tenderness left 1st metatarsal shaft  Bilateral LEs:  Motor:   5/5 dorsiflexion, plantarflexion, EHL  Sensation intact to LT  2+ DP Pulses  SCDs in place    A/P: 72y Female stable POD#0 s/p Left Anterior THR   -  Acetaminophen, Celebrex, Tramadol, Dilaudid  for Pain Control   - DVT ppx: Eliquis 2.5mg q 12 h  - Rizwana op IV abx: Ancef  - Celebrex for HO ppx  - Anterior total hip precautions  - PT, OT per protocol  - F/U AM Labs  - xrays ordered to r/o stress fx Left foot  DCP = home tomorrow pending PT, OT, medical clearance       
POST OPERATIVE DAY #:   2  STATUS POST: Left Anterior THR                       SUBJECTIVE: Patient seen and examined. Ambulated with PT and walked hallway regularly. Tolerating diet. Voiding without difficulty. Had suppository for BM today and reports that it is already working. She called the nurse for assistance after the exam this morning.    Reported Pain score = 5/10 and well controlled with regimen. Able to participate in PT and ambulate without trouble.  Denies: CP/SOB/N/V/Numbness/Tingling    OBJECTIVE:     Vital Signs Last 24 Hrs  T(C): 36.8 (17 Mar 2023 07:24), Max: 36.8 (16 Mar 2023 15:42)  T(F): 98.3 (17 Mar 2023 07:24), Max: 98.3 (16 Mar 2023 15:42)  HR: 79 (17 Mar 2023 07:24) (67 - 79)  BP: 98/61 (17 Mar 2023 07:24) (97/62 - 100/55)  RR: 20 (17 Mar 2023 07:24) (20 - 20)  SpO2: 97% (17 Mar 2023 07:24) (97% - 98%)    Parameters below as of 17 Mar 2023 07:24  Patient On (Oxygen Delivery Method): room air      Gen: AAOx3, NAD  Abd: soft, NT, ND  Left hip:          Mepilex Dressing: clean/dry/intact, drainage noted. Some normal, minimal post-operative redness near incision from retractor placement. No other erythema appreciated.   Bilateral LEs:         Sensation:  intact to light touch          Motor exam:  5/5 dorsiflexion/plantarflexion/EHL          2+ DP pulses          calf supple, NT         SCDs in room, but off since patient was ambulating prior and using the chair    LABS:                        10.3   6.90  )-----------( 148      ( 16 Mar 2023 06:00 )             30.8     03-16    139  |  104  |  17  ----------------------------<  98  3.9   |  29  |  0.76    Ca    8.5      16 Mar 2023 06:00            MEDICATIONS:  Anticoagulation:  apixaban 2.5 milliGRAM(s) Oral every 12 hours      Pain medications:   acetaminophen     Tablet .. 1000 milliGRAM(s) Oral every 8 hours  celecoxib 200 milliGRAM(s) Oral every 12 hours  HYDROmorphone  Injectable 0.5 milliGRAM(s) IV Push every 3 hours PRN  traMADol 50 milliGRAM(s) Oral every 3 hours PRN  traMADol 100 milliGRAM(s) Oral every 6 hours PRN        A/P :   s/p Left Anterior THR POD # 2  -    Pain control Acetaminophen, Celebrex, Tramadol, Dilaudid   -    DVT ppx: Eliquis 2.5mg q 12 h   -    Incentive Spirometry  -    Cryotherapy with protective barrier on skin  -    Medicine co-managment  -    Weight bearing status: WBAT   -    Continue anterior total hip precautions  -    Physical Therapy  -    Occupational Therapy  -    Discharge plan:  home today pending PT, OT, medical clearance     
Patient is a 72y old  Female who presents with a chief complaint of Left hip osteoarthritis  (15 Mar 2023 13:23)      INTERVAL HPI/OVERNIGHT EVENTS:  feeling well, no complaints, wants to go home.     MEDICATIONS  (STANDING):  acetaminophen     Tablet .. 1000 milliGRAM(s) Oral every 8 hours  apixaban 2.5 milliGRAM(s) Oral every 12 hours  atorvastatin 10 milliGRAM(s) Oral at bedtime  celecoxib 200 milliGRAM(s) Oral every 12 hours  famotidine    Tablet 20 milliGRAM(s) Oral two times a day  polyethylene glycol 3350 17 Gram(s) Oral at bedtime  senna 2 Tablet(s) Oral at bedtime    MEDICATIONS  (PRN):  aluminum hydroxide/magnesium hydroxide/simethicone Suspension 30 milliLiter(s) Oral four times a day PRN Indigestion  bisacodyl Suppository 10 milliGRAM(s) Rectal once PRN Constipation  HYDROmorphone  Injectable 0.5 milliGRAM(s) IV Push every 3 hours PRN breakthrough  magnesium hydroxide Suspension 30 milliLiter(s) Oral daily PRN Constipation  ondansetron Injectable 4 milliGRAM(s) IV Push every 6 hours PRN Nausea and/or Vomiting  traMADol 50 milliGRAM(s) Oral every 3 hours PRN Moderate Pain (4 - 6)  traMADol 100 milliGRAM(s) Oral every 6 hours PRN Severe Pain (7 - 10)  zolpidem 5 milliGRAM(s) Oral at bedtime PRN Insomnia      Allergies  iodine (Anaphylaxis)    Intolerances  fentanyl (Pruritus)  hydromorphone (Other; Pruritus)  opioid-like analgesics (Rash)  oxycodone (Pruritus)      REVIEW OF SYSTEMS:  CONSTITUTIONAL: No fever, weight loss, or fatigue  EYES: No eye pain, visual disturbances, or discharge  ENMT:  No difficulty hearing, tinnitus, vertigo; No sinus or throat pain  NECK: No pain or stiffness  BREASTS: No pain, masses, or nipple discharge  RESPIRATORY: No cough, wheezing, chills or hemoptysis; No shortness of breath  CARDIOVASCULAR: No chest pain, palpitations, or lightheadedness  GASTROINTESTINAL: No abdominal or epigastric pain. No nausea, vomiting, or hematemesis; No diarrhea or constipation. No melena or hematochezia.  GENITOURINARY: No dysuria, frequency, hematuria, or incontinence  NEUROLOGICAL: No headaches, vertigo, memory loss, loss of strength, numbness, or tremors  SKIN: No itching, burning, rashes, or lesions   LYMPH NODES: No enlarged glands  ENDOCRINE: No heat or cold intolerance; No hair loss; No polydipsia or polyuria  MUSCULOSKELETAL: No back pain  PSYCHIATRIC: No depression, anxiety, or mood swings  HEME/LYMPH: No easy bruising, or bleeding gums  ALLERGY AND IMMUNOLOGIC: No hives or eczema    Vital Signs Last 24 Hrs  T(C): 36.8 (17 Mar 2023 07:24), Max: 36.8 (16 Mar 2023 15:42)  T(F): 98.3 (17 Mar 2023 07:24), Max: 98.3 (16 Mar 2023 15:42)  HR: 79 (17 Mar 2023 07:24) (67 - 79)  BP: 98/61 (17 Mar 2023 07:24) (97/62 - 100/55)  BP(mean): --  RR: 20 (17 Mar 2023 07:24) (20 - 20)  SpO2: 97% (17 Mar 2023 07:24) (97% - 98%)    Parameters below as of 17 Mar 2023 07:24  Patient On (Oxygen Delivery Method): room air        PHYSICAL EXAM:  GENERAL: NAD, well-groomed, well-developed  HEAD:  Atraumatic, Normocephalic  EYES:  conjunctiva and sclera clear  ENMT: Moist mucous membranes   NECK: Supple, No JVD, Normal thyroid  NERVOUS SYSTEM:  Alert & Oriented X3, Good concentration; Bilateral LE mobile, sensation to light touch intact  CHEST/LUNG: Clear to auscultation bilaterally; No rales, rhonchi, wheezing, or rubs  HEART: Regular rate and rhythm; No murmurs, rubs, or gallops  ABDOMEN: Soft, Nontender, Nondistended; Bowel sounds present  EXTREMITIES:  2+ Peripheral Pulses, No clubbing or cyanosis  LYMPH: No lymphadenopathy noted  SKIN: No rashes or lesions  INCISION:  Dressing dry and intact    LABS:      Ca    8.5        16 Mar 2023 06:00          CAPILLARY BLOOD GLUCOSE          RADIOLOGY & ADDITIONAL TESTS:    Imaging Personally Reviewed:      [ ] Consultant(s) Notes Reviewed  [x] Care Discussed with Consultants/Other Providers:  Ortho PA- plan of care
Patient is a 72y old  Female who presents with a chief complaint of Left hip osteoarthritis  (15 Mar 2023 13:23)      INTERVAL HPI/OVERNIGHT EVENTS:  feeling well, no complaints. pain controlled.  is anxious she wont sleep tonight.  has occasionally used ambien over the years with good effect and no adverse effects.     MEDICATIONS  (STANDING):  acetaminophen     Tablet .. 1000 milliGRAM(s) Oral every 8 hours  apixaban 2.5 milliGRAM(s) Oral every 12 hours  atorvastatin 10 milliGRAM(s) Oral at bedtime  celecoxib 200 milliGRAM(s) Oral every 12 hours  famotidine    Tablet 20 milliGRAM(s) Oral two times a day  lactated ringers. 1000 milliLiter(s) (100 mL/Hr) IV Continuous <Continuous>  polyethylene glycol 3350 17 Gram(s) Oral at bedtime  senna 2 Tablet(s) Oral at bedtime    MEDICATIONS  (PRN):  aluminum hydroxide/magnesium hydroxide/simethicone Suspension 30 milliLiter(s) Oral four times a day PRN Indigestion  HYDROmorphone  Injectable 0.5 milliGRAM(s) IV Push every 3 hours PRN breakthrough  magnesium hydroxide Suspension 30 milliLiter(s) Oral daily PRN Constipation  ondansetron Injectable 4 milliGRAM(s) IV Push every 6 hours PRN Nausea and/or Vomiting  traMADol 50 milliGRAM(s) Oral every 3 hours PRN Moderate Pain (4 - 6)  traMADol 100 milliGRAM(s) Oral every 6 hours PRN Severe Pain (7 - 10)  zolpidem 5 milliGRAM(s) Oral at bedtime PRN Insomnia      Allergies  iodine (Anaphylaxis)    Intolerances  fentanyl (Pruritus)  hydromorphone (Other; Pruritus)  opioid-like analgesics (Rash)  oxycodone (Pruritus)      REVIEW OF SYSTEMS:  CONSTITUTIONAL: No fever, weight loss, or fatigue  EYES: No eye pain, visual disturbances, or discharge  ENMT:  No difficulty hearing, tinnitus, vertigo; No sinus or throat pain  NECK: No pain or stiffness  BREASTS: No pain, masses, or nipple discharge  RESPIRATORY: No cough, wheezing, chills or hemoptysis; No shortness of breath  CARDIOVASCULAR: No chest pain, palpitations, or lightheadedness  GASTROINTESTINAL: No abdominal or epigastric pain. No nausea, vomiting, or hematemesis; No diarrhea or constipation. No melena or hematochezia.  GENITOURINARY: No dysuria, frequency, hematuria, or incontinence  NEUROLOGICAL: No headaches, vertigo, memory loss, loss of strength, numbness, or tremors  SKIN: No itching, burning, rashes, or lesions   LYMPH NODES: No enlarged glands  ENDOCRINE: No heat or cold intolerance; No hair loss; No polydipsia or polyuria  MUSCULOSKELETAL: No back pain  PSYCHIATRIC: No depression, anxiety, or mood swings  HEME/LYMPH: No easy bruising, or bleeding gums  ALLERGY AND IMMUNOLOGIC: No hives or eczema    Vital Signs Last 24 Hrs  T(C): 36.7 (16 Mar 2023 07:59), Max: 36.8 (15 Mar 2023 15:12)  T(F): 98 (16 Mar 2023 07:59), Max: 98.2 (15 Mar 2023 15:12)  HR: 59 (16 Mar 2023 07:59) (56 - 83)  BP: 93/53 (16 Mar 2023 07:59) (90/51 - 121/68)  BP(mean): --  RR: 18 (16 Mar 2023 07:59) (15 - 18)  SpO2: 98% (16 Mar 2023 07:59) (94% - 98%)    Parameters below as of 16 Mar 2023 07:59  Patient On (Oxygen Delivery Method): room air        PHYSICAL EXAM:  GENERAL: NAD, well-groomed, well-developed  HEAD:  Atraumatic, Normocephalic  EYES:  conjunctiva and sclera clear  ENMT: Moist mucous membranes  NECK: Supple, No JVD, Normal thyroid  NERVOUS SYSTEM:  Alert & Oriented X3, Good concentration; Bilateral LE mobile, sensation to light touch intact  CHEST/LUNG: Clear to auscultation bilaterally; No rales, rhonchi, wheezing, or rubs  HEART: Regular rate and rhythm; No murmurs, rubs, or gallops  ABDOMEN: Soft, Nontender, Nondistended; Bowel sounds present  EXTREMITIES:  2+ Peripheral Pulses, No clubbing or cyanosis  LYMPH: No lymphadenopathy noted  SKIN: No rashes or lesions  INCISION:  Dressing dry and intact    LABS:                        10.3   6.90  )-----------( 148      ( 16 Mar 2023 06:00 )             30.8     16 Mar 2023 06:00    139    |  104    |  17     ----------------------------<  98     3.9     |  29     |  0.76     Ca    8.5        16 Mar 2023 06:00          CAPILLARY BLOOD GLUCOSE          RADIOLOGY & ADDITIONAL TESTS:    Imaging Personally Reviewed:      [ ] Consultant(s) Notes Reviewed  [x] Care Discussed with Consultants/Other Providers:  Ortho PA- plan of care

## 2023-03-18 ENCOUNTER — TRANSCRIPTION ENCOUNTER (OUTPATIENT)
Age: 73
End: 2023-03-18

## 2023-03-20 ENCOUNTER — TRANSCRIPTION ENCOUNTER (OUTPATIENT)
Age: 73
End: 2023-03-20

## 2023-03-20 NOTE — CASE MANAGEMENT PROGRESS NOTE - NSCMPROGRESSNOTE_GEN_ALL_CORE
CM received call this am from patient stating she has redness round incision site and she has been trying to reach out to [facility] to have the nurse see her to evaluate. JOSE spoke to Ellen at [facility] and advised that the nurse will see patient today before 12pm, patient is aware. CM will cont to be available.

## 2023-03-24 ENCOUNTER — APPOINTMENT (OUTPATIENT)
Dept: ORTHOPEDIC SURGERY | Facility: CLINIC | Age: 73
End: 2023-03-24
Payer: MEDICARE

## 2023-03-24 VITALS — HEIGHT: 68 IN

## 2023-03-24 DIAGNOSIS — M62.838 OTHER MUSCLE SPASM: ICD-10-CM

## 2023-03-24 PROCEDURE — 73502 X-RAY EXAM HIP UNI 2-3 VIEWS: CPT | Mod: LT

## 2023-03-24 PROCEDURE — 99024 POSTOP FOLLOW-UP VISIT: CPT

## 2023-03-24 RX ORDER — TIZANIDINE 2 MG/1
2 TABLET ORAL
Qty: 15 | Refills: 0 | Status: ACTIVE | COMMUNITY
Start: 2023-03-24 | End: 1900-01-01

## 2023-03-26 PROBLEM — M16.12 UNILATERAL PRIMARY OSTEOARTHRITIS, LEFT HIP: Chronic | Status: ACTIVE | Noted: 2023-02-24

## 2023-03-26 PROBLEM — D68.51 ACTIVATED PROTEIN C RESISTANCE: Chronic | Status: ACTIVE | Noted: 2023-02-24

## 2023-03-30 ENCOUNTER — APPOINTMENT (OUTPATIENT)
Dept: ORTHOPEDIC SURGERY | Facility: CLINIC | Age: 73
End: 2023-03-30
Payer: MEDICARE

## 2023-03-30 VITALS — TEMPERATURE: 98.3 F | DIASTOLIC BLOOD PRESSURE: 80 MMHG | HEART RATE: 71 BPM | SYSTOLIC BLOOD PRESSURE: 127 MMHG

## 2023-03-30 DIAGNOSIS — Z96.642 PRESENCE OF LEFT ARTIFICIAL HIP JOINT: ICD-10-CM

## 2023-03-30 PROCEDURE — 99024 POSTOP FOLLOW-UP VISIT: CPT

## 2023-03-30 RX ORDER — AMOXICILLIN 500 MG/1
500 CAPSULE ORAL
Qty: 12 | Refills: 0 | Status: ACTIVE | COMMUNITY
Start: 2023-03-30 | End: 1900-01-01

## 2023-03-30 NOTE — HISTORY OF PRESENT ILLNESS
[___ Days Post Op] : post op day #[unfilled] [5] : the patient reports pain that is 5/10 in severity [Clean/Dry/Intact] : clean, dry and intact [Swelling] : swollen [Neuro Intact] : an unremarkable neurological exam [Vascular Intact] : ~T peripheral vascular exam normal [Negative Jerel's] : maneuvers demonstrated a negative Jerel's sign [Xray (Date:___)] : [unfilled] Xray -  [Hardware in Good Position] : hardware in good position [No Obvious Fractures] : no obvious fractures [Good Overall Alignment] : good overall alignment [Doing Well] : is doing well [Signs of Infection] : is showing signs of an infection [Adequate Pain Control] : has adequate pain control [Chills] : no chills [Constipation] : no constipation [Diarrhea] : no diarrhea [Dysuria] : no dysuria [Fever] : no fever [Nausea] : no nausea [Vomiting] : no vomiting [Erythema] : not erythematous [Discharge] : absent of discharge [Dehiscence] : not dehisced [de-identified] : Anterior left total hip replacement 3/15/2023\par The patient is a 72 yr old female presents today for evaluation of the left groin pain she is experiencing. She is s/p left anterior total hip replacement performed at [facility] on 3/15/2023 [de-identified] : Patient was discharged home with Home care service. She presents for evaluation left groin pain she is experiencing. She reports she was feeling "Amazing" walking around with no pain.3 days ago she reached in the cabinet for her Tylenol and felt a sharp pull to the left groin. She reports she was experiencing an increase pain and used iced with some relief. She was going up her stairs last evening and the same experience occurred. She is also reporting a spasm in the left groin area. She was having difficulty with PT today and advised to call the office for evaluation. She reports she experiences most pain if she lifts the left lower extremity - getting in and out of bed, up and down stairs or in and out of the car. She also experiences the pain if she turns her left leg inward\par  She is ambulating with a walker. She is receiving home physical therapy and performing home exercises. The patient reports pain of 5/10 .She is not taking tramadol for analgesia relief on an intermittent basis.She continues to use Tylenol, applying ice and elevating the lower extremity. Patient is using eliquis twice a day for DVT prophylaxis and prevention until 4 weeks post operative. The patient is using Celebrex 200 mg twice a day for prevention of heterotrophic bone ossification until 21 days post operative [de-identified] : The patient has stable antalgic gait utilizing a walker. The left anterior hip incision with dermabond tape intact. The incision site is without redness, warmth or drainage. The hip flexes to 75 degrees with minimal discomfort. The external and internal rotation of the left hip is with minimal discomfort and stiffness. Leg lengths appear equal. There is no evidence of infection or cellulitis. The calf is supple and without tenderness, warmth or redness.\par \par  [de-identified] : Radiographs, including 1 view of the left hip and 1 view of the pelvis were obtained. X-rays reveal a well-positioned, well fit, total hip replacement in excellent alignment. No obvious evidence of fractures, dislocation or osteolysis noted.\par \par  [de-identified] : The patient was advised of the nature of the healing process. Patient was advised of the importance of adhering to the DVT prophylaxis protocol utilizing Eliquis 2.5 MG BID until 4 weeks post operative. Patient was advised of the importance of continuing to take Celebrex 200 mg twice daily for prevention of heterotrophic bone ossification for a total of 21 days post operative. The patient was advised to hold physical therapy and continue with home exercises as recommended.\par  Prescription was given to the patient for tizanidine for spasm ( she has used in the past)\par She was advised to continue to use Tylenol 1000mg 3 x day and to use tramadol as needed for analgesic relief. Patient has her first post operative appointment scheduled for 1 week and was advised to return at that appointment. Dermabond tape to the left anterior hip incision remains intact - will be removed next week\par Patient verbalized understanding of all instructions and all of her questions were addressed to her satisfaction. The patient was advised to call the office with any further questions or concerns.\par \par

## 2023-04-12 ENCOUNTER — NON-APPOINTMENT (OUTPATIENT)
Age: 73
End: 2023-04-12

## 2023-04-12 RX ORDER — APIXABAN 2.5 MG/1
2.5 TABLET, FILM COATED ORAL
Qty: 8 | Refills: 0 | Status: ACTIVE | COMMUNITY
Start: 2023-04-12 | End: 1900-01-01

## 2023-05-02 ENCOUNTER — APPOINTMENT (OUTPATIENT)
Dept: ORTHOPEDIC SURGERY | Facility: CLINIC | Age: 73
End: 2023-05-02
Payer: MEDICARE

## 2023-05-02 VITALS — DIASTOLIC BLOOD PRESSURE: 83 MMHG | SYSTOLIC BLOOD PRESSURE: 148 MMHG | HEART RATE: 76 BPM

## 2023-05-02 PROCEDURE — 99024 POSTOP FOLLOW-UP VISIT: CPT

## 2023-05-02 PROCEDURE — 73502 X-RAY EXAM HIP UNI 2-3 VIEWS: CPT

## 2023-06-20 ENCOUNTER — APPOINTMENT (OUTPATIENT)
Dept: ORTHOPEDIC SURGERY | Facility: CLINIC | Age: 73
End: 2023-06-20

## 2023-08-23 ENCOUNTER — APPOINTMENT (OUTPATIENT)
Dept: ORTHOPEDIC SURGERY | Facility: CLINIC | Age: 73
End: 2023-08-23
Payer: MEDICARE

## 2023-08-23 DIAGNOSIS — S46.319A STRAIN OF MUSCLE, FASCIA AND TENDON OF TRICEPS, UNSPECIFIED ARM, INITIAL ENCOUNTER: ICD-10-CM

## 2023-08-23 PROCEDURE — 99203 OFFICE O/P NEW LOW 30 MIN: CPT

## 2023-08-23 PROCEDURE — 73070 X-RAY EXAM OF ELBOW: CPT | Mod: RT

## 2023-08-23 NOTE — PHYSICAL EXAM
[NL (150)] : flexion 150 degrees [NL (0)] : extension 0 degrees [Pain with Extension] : pain with extension [NL (90)] : supination 90 degrees [5___] : flexion 5[unfilled]/5 [] : light touch intact [Right] : right elbow [There are no fractures, subluxations or dislocations. No significant abnormalities are seen] : There are no fractures, subluxations or dislocations. No significant abnormalities are seen [Degenerative change] : Degenerative change

## 2023-08-23 NOTE — DISCUSSION/SUMMARY
[de-identified] : I reviewed patient's radiographs and discussed her condition and treatment options.  I advised return to lifting, but less weight and more repetition with focus on her form.  Patient voiced understanding and agreement with the plan.

## 2023-08-23 NOTE — HISTORY OF PRESENT ILLNESS
[de-identified] : Patient presents for RT elbow pain. Patient states she rows and lifts weights. States she started feeling pain in July. Was doing PT and thinks she has tennis elbow. Takes Diclofenac and doesn't want to because of concern for stomach ulcers. Is interested in cortisone shot. Patient is RHD and is experiencing tingling. Pain has gotten worse in the last 2 days in posterior aspect of elbow.

## 2023-09-13 ENCOUNTER — APPOINTMENT (OUTPATIENT)
Dept: ORTHOPEDIC SURGERY | Facility: CLINIC | Age: 73
End: 2023-09-13

## 2023-10-05 ENCOUNTER — APPOINTMENT (OUTPATIENT)
Dept: ULTRASOUND IMAGING | Facility: CLINIC | Age: 73
End: 2023-10-05
Payer: MEDICARE

## 2023-10-05 ENCOUNTER — RESULT REVIEW (OUTPATIENT)
Age: 73
End: 2023-10-05

## 2023-10-05 ENCOUNTER — APPOINTMENT (OUTPATIENT)
Dept: MAMMOGRAPHY | Facility: CLINIC | Age: 73
End: 2023-10-05
Payer: MEDICARE

## 2023-10-05 PROCEDURE — 76641 ULTRASOUND BREAST COMPLETE: CPT | Mod: 26,50,GY

## 2023-10-05 PROCEDURE — 76856 US EXAM PELVIC COMPLETE: CPT

## 2023-10-05 PROCEDURE — 77067 SCR MAMMO BI INCL CAD: CPT | Mod: 26

## 2023-10-05 PROCEDURE — 76830 TRANSVAGINAL US NON-OB: CPT

## 2023-10-05 PROCEDURE — 76641 ULTRASOUND BREAST COMPLETE: CPT | Mod: 50,GY

## 2023-10-05 PROCEDURE — 77063 BREAST TOMOSYNTHESIS BI: CPT

## 2023-10-16 NOTE — DISCHARGE NOTE PROVIDER - HOSPITAL COURSE
Patient is in the supine position.   The body was positioned using the following devices: safety strap and gel pad mattress.  The head was positioned using the following devices: regular pillow.  The left arm was positioned using the following devices: arm board and gel arm pads.  The right arm was positioned using the following devices: arm board and gel arm pads.  The patient was positioned by Demetria Andre Olson, Samantha R This patient was admitted to [facility] with a history of severe degenerative joint disease of the left hip.  Patient underwent Pre-Surgical Testing and was medically cleared to undergo elective procedure. Patient underwent left anterior hip replacement by Dr. Sarmiento on 3/15/23. Procedure was well tolerated.  No operative or susanna-operative complications arose during patient's hospital course.  Patient received antibiotic according to SCIP guidelines for infection prevention.  Eliquis 2.5 mg was given for DVT prophylaxis, in addition to the use of SCDs.  Anesthesia, Medical Hospitalist, Physical Therapy and Occupational Therapy were consulted. Patient is stable for discharge with a good prognosis.  Appropriate discharge instructions and medications are provided in this document.

## 2024-03-07 NOTE — PRE-OP CHECKLIST - WEIGHT IN LBS
"[facility]  Discharge Summary - Medicine & Pediatrics       Date of Admission:  3/5/2024  Date of Discharge:  3/7/2024  Discharging Provider: Marcus Powell MD  Discharge Service: [facility] Medicine Service    Discharge Diagnoses   1. Human metapneumovirus (hMPV) pneumonia    2. Exacerbation of asthma, unspecified asthma severity, unspecified whether persistent    3. Fever, unspecified fever cause    4. Infection due to human metapneumovirus (hMPV)    5. Encounter for screening for COVID-19          Clinically Significant Risk Factors     # Obesity: Estimated body mass index is 38.97 kg/m  as calculated from the following:    Height as of this encounter: 1.6 m (5' 3\").    Weight as of this encounter: 99.8 kg (220 lb).       Follow-ups Needed After Discharge   Follow-up Appointments     Adult [facility] Follow-up and recommended labs and tests      Follow up with primary care provider, Franca Mishra, within 2 weeks,   for hospital follow- up. No follow up labs or test are needed.     Appointments on [place] and/or [facility] (with [facility] or [facility]   provider or service). Call [phone] if you haven't heard regarding   these appointments within 7 days of discharge.              Discharge Disposition   Discharged to home  Condition at discharge: [facility]    Hospital Course   Mojgan Jimenez is a 52 year old female with a past medical history of asthma, hypertension, Hyperlipidemia, morbid obesity (BMI 41) s/p recent gastric sleeve, stress cardiomyopathy/Hfimpef and recent admission for CAP who presents for acute hypoxic respiratory failure concerning for severe asthma exacerbation. The following problems were addressed during her hospitalization.     Acute Hypoxic respiratory failure  Asthma Exacerbation, Human Metapneumovirus trigger   History AHRF requiring intubation in 10/2023 for 14 days. Vitals have been stable, initially desaturating into the mid-low 80's . " No abnormalities on CBC of BMP, although there is notable hyperglycemia likely 2/2 to steroids. Respiratory viral panel identified Human Metapneumovirus.  Since patient is not on O2 at home at rest, current presentation is significantly away from baseline. Patient does have O2 at home from previous hospitalization. OK to use up to 2L while recovering. Patient was treated with Q4 PRN Duonebs, and ok to continue these at home as needed. Patient says she has a supply of these as well. Continue prednisone 40 mg daily to complete a 7 day course (last day 3/12). Ok to take flutter valve home and use PRN. Education given regarding the infectiousness of human metapneumovirus and precautions surrounding contacts. Return precautions discussed.     Hypertension  Stress cardiomyopathy  10/09/23: Echo with ejection fraction is 30-35% (moderately reduced). 01/05/24 Echo: left ventricular function is normal with an EF of 55-60%. This would meet the criteria of Hfimpef. Presentation was not consistent with a heart failure presentation: no crackles on exam, no bilateral lower extremity edema and normal BNP. Low concern for acute heart failure exacerbation. OK to restart BP meds outside the hospital.        Consultations This Hospital Stay   PHYSICAL THERAPY ADULT IP CONSULT  OCCUPATIONAL THERAPY ADULT IP CONSULT  RESPIRATORY CARE IP CONSULT  CARE MANAGEMENT / SOCIAL WORK IP CONSULT    Code Status   Full Code       The patient was discussed with DO [facility] Teaching Service  [facility]  [address] [address]  Phone: [phone]  Fax: [phone]  ______________________________________________________________________    Physical Exam   Vital Signs: Temp: 98.2  F (36.8  C) Temp src: Oral BP: (!) 143/85 Pulse: 95   Resp: 22 SpO2: 95 % O2 Device: Nasal cannula Oxygen Delivery: 1 LPM  Weight: 220 lbs 0 oz  Physical Exam  Constitutional:        Appearance: Normal appearance.   HENT:      Head: Normocephalic and atraumatic.      Nose: Nose normal.      Mouth/Throat:      Mouth: Mucous membranes are moist.   Eyes:      Extraocular Movements: Extraocular movements intact.      Conjunctiva/sclera: Conjunctivae normal.   Cardiovascular:      Rate and Rhythm: Normal rate and regular rhythm.      Heart sounds: Normal heart sounds.   Pulmonary:      Effort: Pulmonary effort is normal.      Breath sounds: Wheezing present.   Abdominal:      General: Abdomen is flat. There is no distension.      Tenderness: There is no abdominal tenderness.   Musculoskeletal:         General: Normal range of motion.      Cervical back: Normal range of motion.   Skin:     General: Skin is warm and dry.   Neurological:      General: No focal deficit present.      Mental Status: She is alert and oriented to person, place, and time.   Psychiatric:         Mood and Affect: Mood normal.         Behavior: Behavior normal.         Thought Content: Thought content normal.         Judgment: Judgment normal.             Primary Care Physician   Farnca Mishra    Discharge Orders      Reason for your hospital stay    You were in the hospital for an asthma exacerbation caused by Human Metapneumovirus     Activity    Your activity upon discharge: activity as tolerated     Adult [facility] Follow-up and recommended labs and tests    Follow up with primary care provider, Franca Mishra, within 2 weeks, for hospital follow- up. No follow up labs or test are needed.     Appointments on [place] and/or [facility] (with [facility] or [facility] provider or service). Call [phone] if you haven't heard regarding these appointments within 7 days of discharge.     Diet    Follow this diet upon discharge: Orders Placed This Encounter      Combination Diet Regular Diet Adult       Significant Results and Procedures   Results for orders placed or performed during the hospital encounter of 03/05/24   XR  Chest 2 Views    Narrative    CHEST TWO VIEWS 3/5/2024 12:19 PM     HISTORY: sob    COMPARISON: Chest x-ray 1/9/2024.       Impression    IMPRESSION: Cardiopericardial silhouette is within normal limits.  Similar mild opacity of the right lung base which may represent  atelectasis or scarring. No new focal airspace consolidation. No  pleural effusion. No discernible pneumothorax. No acute displaced  fracture.    BEKA AGUILLON MD         SYSTEM ID:  [id number]       Discharge Medications   Current Discharge Medication List        START taking these medications    Details   guaiFENesin (MUCINEX) 600 MG 12 hr tablet Take 2 tablets (1,200 mg) by mouth 2 times daily  Qty: 10 tablet, Refills: 0    Associated Diagnoses: Exacerbation of asthma, unspecified asthma severity, unspecified whether persistent; Infection due to human metapneumovirus (hMPV)      predniSONE (DELTASONE) 20 MG tablet Take 2 tablets (40 mg) by mouth daily for 5 days  Qty: 10 tablet, Refills: 0    Associated Diagnoses: Exacerbation of asthma, unspecified asthma severity, unspecified whether persistent      sodium chloride (OCEAN) 0.65 % nasal spray Use as needed to clear nasal congestion.  Qty: 15 mL, Refills: 0    Associated Diagnoses: Exacerbation of asthma, unspecified asthma severity, unspecified whether persistent; Infection due to human metapneumovirus (hMPV)           CONTINUE these medications which have NOT CHANGED    Details   ADVAIR -21 MCG/ACT inhaler Inhale 2 Puffs by mouth two times daily.*      albuterol (PROAIR HFA) 108 (90 Base) MCG/ACT inhaler Inhale 1-2 puffs into the lungs every 4 hours as needed for shortness of breath / dyspnea or wheezing  Qty: 8.5 g, Refills: 11    Comments: Pharmacy may dispense brand covered by insurance (Proair, or proventil or ventolin or generic albuterol inhaler)  Associated Diagnoses: Moderate persistent asthma without complication      albuterol (PROVENTIL) (2.5 MG/3ML) 0.083% neb solution Take 1  vial (2.5 mg) by nebulization every 6 hours as needed for shortness of breath or wheezing  Qty: 3 mL, Refills: 4    Associated Diagnoses: Moderate persistent asthma without complication      amLODIPine (NORVASC) 10 MG tablet Take 1 tablet (10 mg) by mouth daily  Qty: 30 tablet, Refills: 1    Associated Diagnoses: Cardiomyopathy, unspecified type (H); Hypertension, unspecified type      carvedilol (COREG) 25 MG tablet Take 1 tablet (25 mg) by mouth 2 times daily (with meals)  Qty: 60 tablet, Refills: 1    Associated Diagnoses: Cardiomyopathy, unspecified type (H); Hypertension, unspecified type      cetirizine (ZYRTEC) 10 MG tablet Take 10 mg by mouth daily      cholecalciferol (VITAMIN D3) 125 mcg (5000 units) capsule Take 1 capsule (125 mcg) by mouth daily  Qty: 30 capsule, Refills: 1    Associated Diagnoses: Debility      cloNIDine (CATAPRES-TTS1) 0.1 MG/24HR WK patch Place 1 patch onto the skin once a week Reminder: Remove previous patch before applying new patch.  If partial dose is needed, use adhesive bandage to cover/block proportional surface area of the patch. Patches should NOT be cut.  Qty: 4 patch, Refills: 1    Associated Diagnoses: Hypertension, unspecified type      cyanocobalamin (VITAMIN B-12) 1000 MCG sublingual tablet Place 1 tablet (1,000 mcg) under the tongue daily  Qty: 30 tablet, Refills: 1    Associated Diagnoses: Debility      gabapentin (NEURONTIN) 300 MG capsule Take 1 capsule (300 mg) by mouth at bedtime  Qty: 30 capsule, Refills: 1    Associated Diagnoses: LINA (generalized anxiety disorder)      Lidocaine (LIDOCARE) 4 % Patch Place 1 patch onto the skin every 24 hours To prevent lidocaine toxicity, patient should be patch free for 12 hrs daily.Apply patch(s) to neck for neck pain. To prevent lidocaine toxicity, patient should be patch free for 12 hrs daily. Patches may be cut to smaller size prior to removing release liner. Reminder: Remove previous patch before applying new patch.   NEVER APPLY HEAT OVER PATCH which increases absorption and may lead to local anesthetic toxicity. Do not apply over area where liposomal bupivacaine was injected for 96 hours post injection.  Qty: 15 patch, Refills: 1    Associated Diagnoses: Neck pain      magnesium oxide (MAG-OX) 400 MG tablet Take 1 tablet (400 mg) by mouth daily  Qty: 30 tablet, Refills: 1    Associated Diagnoses: Debility      Pediatric Multivitamins-Iron (MULTIVITAMINS PLUS IRON CHILD) 18 MG CHEW Take 1 chew tab by mouth 2 times daily Ok to substitute with any chewable that contains 18 mg of iron, Vitamin A, Thiamine and Zinc.  Qty: 180 tablet, Refills: 3    Comments: Ok to substitute with any chewable that contains 18 mg of iron, Vitamin A, Thiamine and Zinc.  Associated Diagnoses: Morbid obesity (H); S/P bariatric surgery; Intestinal malabsorption, unspecified type      propranolol (INDERAL) 10 MG tablet Take 10 mg by mouth 3 times daily as needed      rosuvastatin (CRESTOR) 10 MG tablet Take 1 tablet (10 mg) by mouth daily  Qty: 90 tablet, Refills: 9    Associated Diagnoses: Hyperlipidemia LDL goal <160      ursodiol (ACTIGALL) 300 MG capsule Take 1 capsule (300 mg) by mouth 2 times daily for 180 days Start 2 weeks after surgery, do not open-take with warm liquid. Take twice a day for 6 months.  Qty: 180 capsule, Refills: 1    Comments: Start 2 weeks after surgery, do not open-take with warm liquid. Take twice a day for 6 months.  Associated Diagnoses: Morbid obesity (H); S/P bariatric surgery; Rapid weight loss      venlafaxine (EFFEXOR) 75 MG tablet Take 1 tablet (75 mg) by mouth 3 times daily  Qty: 30 tablet, Refills: 1    Associated Diagnoses: LINA (generalized anxiety disorder)      acetaminophen (TYLENOL) 500 MG tablet Take 500-1,000 mg by mouth every 6 hours as needed for mild pain           Allergies   No Known Allergies   162.7

## 2024-10-07 ENCOUNTER — RESULT REVIEW (OUTPATIENT)
Age: 74
End: 2024-10-07

## 2024-10-07 ENCOUNTER — APPOINTMENT (OUTPATIENT)
Dept: MAMMOGRAPHY | Facility: CLINIC | Age: 74
End: 2024-10-07
Payer: MEDICARE

## 2024-10-07 ENCOUNTER — APPOINTMENT (OUTPATIENT)
Dept: RADIOLOGY | Facility: CLINIC | Age: 74
End: 2024-10-07
Payer: MEDICARE

## 2024-10-07 ENCOUNTER — APPOINTMENT (OUTPATIENT)
Dept: ULTRASOUND IMAGING | Facility: CLINIC | Age: 74
End: 2024-10-07
Payer: MEDICARE

## 2024-10-07 PROCEDURE — 76830 TRANSVAGINAL US NON-OB: CPT

## 2024-10-07 PROCEDURE — 76856 US EXAM PELVIC COMPLETE: CPT

## 2024-10-07 PROCEDURE — 76641 ULTRASOUND BREAST COMPLETE: CPT | Mod: 50,GA

## 2024-10-07 PROCEDURE — 77063 BREAST TOMOSYNTHESIS BI: CPT

## 2024-10-07 PROCEDURE — 77080 DXA BONE DENSITY AXIAL: CPT

## 2024-10-07 PROCEDURE — 77067 SCR MAMMO BI INCL CAD: CPT

## 2024-10-19 ENCOUNTER — NON-APPOINTMENT (OUTPATIENT)
Age: 74
End: 2024-10-19

## 2024-11-18 NOTE — PATIENT PROFILE ADULT - DO YOU FEEL THREATENED BY OTHERS?
no Complexity (Necessary For Coding; Major - 90 Day Global With Some Exceptions; Minor - 10 Day Global): minor Initial Decision For Surgery?: Yes Date Of Surgery - Today Or Tomorrow?: today Discussion: The nature of the lesion(s) was discussed.  Questions were answered.  I recommend surgery as appropriate treatment due to the site, size and histologic type of the tumor.  Surgery will mitigate risk of poor outcomes due to recurrence.  The procedure, morbidity (bruising, swelling, wound care, bandaging), activity restrictions, time off work and complications (bleeding, infection, scarring) were reviewed.  The potential appearance of the scar was discussed.  Questions were answered.  Their procedure was performed today, see dictation. Risk Assessment Explanation (Does Not Render In The Note): Clinical determination of the probability and/or consequences of an event, such as surgery. Clinical assessment of the level of risk is affected by the nature of the event under consideration for the patient. Modifier 57 is used to indicate an Evaluation and Management (E/M) service resulted in the initial decision to perform surgery either the day before a major surgery (90 day global) or the day of a major surgery.

## 2024-12-31 ENCOUNTER — NON-APPOINTMENT (OUTPATIENT)
Age: 74
End: 2024-12-31

## 2025-01-02 ENCOUNTER — APPOINTMENT (OUTPATIENT)
Dept: RHEUMATOLOGY | Facility: CLINIC | Age: 75
End: 2025-01-02
Payer: MEDICARE

## 2025-01-02 ENCOUNTER — NON-APPOINTMENT (OUTPATIENT)
Age: 75
End: 2025-01-02

## 2025-01-02 VITALS
OXYGEN SATURATION: 98 % | HEART RATE: 71 BPM | HEIGHT: 68 IN | SYSTOLIC BLOOD PRESSURE: 140 MMHG | DIASTOLIC BLOOD PRESSURE: 80 MMHG | WEIGHT: 157 LBS | BODY MASS INDEX: 23.79 KG/M2

## 2025-01-02 DIAGNOSIS — H04.123 DRY MOUTH, UNSPECIFIED: ICD-10-CM

## 2025-01-02 DIAGNOSIS — R68.2 DRY MOUTH, UNSPECIFIED: ICD-10-CM

## 2025-01-02 PROCEDURE — G2211 COMPLEX E/M VISIT ADD ON: CPT

## 2025-01-02 PROCEDURE — 36415 COLL VENOUS BLD VENIPUNCTURE: CPT

## 2025-01-02 PROCEDURE — 99205 OFFICE O/P NEW HI 60 MIN: CPT

## 2025-01-02 RX ORDER — PILOCARPINE HYDROCHLORIDE 5 MG/1
5 TABLET, FILM COATED ORAL 3 TIMES DAILY
Qty: 270 | Refills: 1 | Status: ACTIVE | COMMUNITY
Start: 2025-01-02 | End: 1900-01-01

## 2025-01-08 ENCOUNTER — TRANSCRIPTION ENCOUNTER (OUTPATIENT)
Age: 75
End: 2025-01-08

## 2025-01-09 LAB
25(OH)D3 SERPL-MCNC: 52.3 NG/ML
ALBUMIN SERPL ELPH-MCNC: 4.2 G/DL
ALP BLD-CCNC: 53 U/L
ALT SERPL-CCNC: 25 U/L
ANA PAT FLD IF-IMP: ABNORMAL
ANA SER IF-ACNC: ABNORMAL
ANCA AB SER-IMP: ABNORMAL
ANION GAP SERPL CALC-SCNC: 15 MMOL/L
APPEARANCE: CLEAR
AST SERPL-CCNC: 33 U/L
BILIRUB SERPL-MCNC: 0.3 MG/DL
BILIRUBIN URINE: NEGATIVE
BLOOD URINE: NEGATIVE
BUN SERPL-MCNC: 17 MG/DL
C-ANCA SER-ACNC: NEGATIVE
C3 SERPL-MCNC: 128 MG/DL
C4 SERPL-MCNC: 29 MG/DL
CALCIUM SERPL-MCNC: 10.1 MG/DL
CCP AB SER IA-ACNC: <8 U/ML
CENTROMERE IGG SER-ACNC: >8 AL
CHLORIDE SERPL-SCNC: 101 MMOL/L
CO2 SERPL-SCNC: 22 MMOL/L
COLOR: YELLOW
CREAT SERPL-MCNC: 0.68 MG/DL
CREAT SPEC-SCNC: 31 MG/DL
CREAT/PROT UR: 0.2 RATIO
CRP SERPL-MCNC: <3 MG/L
DSDNA AB SER-ACNC: 2 IU/ML
EGFR: 91 ML/MIN/1.73M2
ENA RNP AB SER IA-ACNC: 0.2 AL
ENA SCL70 IGG SER IA-ACNC: <0.2 AL
ENA SM AB SER IA-ACNC: <0.2 AL
ENA SS-A AB SER IA-ACNC: <0.2 AL
ENA SS-B AB SER IA-ACNC: <0.2 AL
G6PD SER-CCNC: 12.6 U/G HGB
GLUCOSE QUALITATIVE U: NEGATIVE MG/DL
GLUCOSE SERPL-MCNC: 91 MG/DL
KETONES URINE: NEGATIVE MG/DL
LEUKOCYTE ESTERASE URINE: NEGATIVE
NITRITE URINE: NEGATIVE
P-ANCA TITR SER IF: NEGATIVE
PH URINE: 6.5
POTASSIUM SERPL-SCNC: 4.7 MMOL/L
PROT SERPL-MCNC: 7.5 G/DL
PROT UR-MCNC: 6 MG/DL
PROTEIN URINE: NEGATIVE MG/DL
RF+CCP IGG SER-IMP: NEGATIVE
RHEUMATOID FACT SER QL: <10 IU/ML
SODIUM SERPL-SCNC: 138 MMOL/L
SPECIFIC GRAVITY URINE: 1.01
TSH SERPL-ACNC: 2.51 UIU/ML
UROBILINOGEN URINE: 0.2 MG/DL

## 2025-01-14 ENCOUNTER — APPOINTMENT (OUTPATIENT)
Dept: RHEUMATOLOGY | Facility: CLINIC | Age: 75
End: 2025-01-14
Payer: MEDICARE

## 2025-01-14 DIAGNOSIS — R68.2 DRY MOUTH, UNSPECIFIED: ICD-10-CM

## 2025-01-14 DIAGNOSIS — H04.123 DRY MOUTH, UNSPECIFIED: ICD-10-CM

## 2025-01-14 DIAGNOSIS — R76.8 OTHER SPECIFIED ABNORMAL IMMUNOLOGICAL FINDINGS IN SERUM: ICD-10-CM

## 2025-01-14 PROCEDURE — 99214 OFFICE O/P EST MOD 30 MIN: CPT

## 2025-01-14 PROCEDURE — G2211 COMPLEX E/M VISIT ADD ON: CPT

## 2025-01-15 ENCOUNTER — TRANSCRIPTION ENCOUNTER (OUTPATIENT)
Age: 75
End: 2025-01-15

## 2025-01-16 ENCOUNTER — TRANSCRIPTION ENCOUNTER (OUTPATIENT)
Age: 75
End: 2025-01-16

## 2025-01-28 NOTE — CARE COORDINATION ASSESSMENT. - NSDCPLANSERVICES_GEN_ALL_CORE
Orthopedic Consult Note     ADMISSION DATE:  1/27/2025  CONSULTING PHYSICIAN: Dr Amadeo Caballero  ATTENDING PHYSICIAN:  Rajiv Gamino,      I was asked to see Kia Wilder at the request of ER.      REASON FOR CONSULT: Right shoulder fracture dislocation    Chief Complaint   Patient presents with    Fall       DOI: 1/27/2025  KANCHAN: Ground-level fall    History of Present Illness:     Kia Wilder is a 64 year old female patient who sustained a ground-level fall on 1/27/2025 and was found to have right glenohumeral joint fracture dislocation with fracture involvement of the greater tuberosity.  Pt was admitted from the ED for surgical management of the above issue    Patient reports that she had a mechanical fall where she which she fell onto her right upper extremity.  She had immediate pain and inability to move her right shoulder.  She was brought to the ED with a found her to have a right glenohumeral joint dislocation with associated greater tuberosity fracture.  Reduction was attempted under sedation by the emergency room team and was unsuccessful.  Patient was therefore admitted for surgical management of this issue.  Denies numbness or paresthesias to the right upper extremity.  Denies pain to other extremities.    Pre-injury mobility: Prior to injury patient had full use of her bilateral upper extremities and was able to ambulate on her own without use of assistive devices.    Past Medical History:     Past Medical History:   Diagnosis Date    Arthritis     Essential (primary) hypertension     High cholesterol     High cholesterol        Past Surgical History:   No past surgical history on file.    Medications:     Current Outpatient Medications   Medication Instructions    atorvastatin (LIPITOR) 20 mg, Oral, DAILY    blood glucose meter 1 m, Does not apply, 3 TIMES DAILY, Test blood sugar 3 times daily. Diagnosis: e11.9. Meter: one touch meeter    blood glucose test strip 1 m, Other, 3 TIMES DAILY, Test  blood sugar 3 times daily. Diagnosis: e11.9. Meter: One touch meter    bumetanide (BUMEX) 0.5 mg, Oral, DAILY    butalbital-APAP-caffeine (FIORICET) -40 MG per tablet 1 tablet, Oral, EVERY 6 HOURS PRN    Cholecalciferol 50 mcg (2,000 units) tablet EVERY 24 HOURS    DISPENSE Glucose test strips for New Diabetic Pt testing 3 times per day    gabapentin (NEURONTIN) 100 mg, Oral, 3 TIMES DAILY    Lancets 28G Misc Use 3 times daily    Lidocaine 3 % Cream 1 Application, Transdermal, EVERY 8 HOURS SCHEDULED    lisinopril (ZESTRIL) 40 mg, Oral, DAILY, Take one tab by mouth daily    meloxicam (MOBIC) 7.5 mg, Oral, 2 TIMES DAILY PRN    metoPROLOL succinate (TOPROL-XL) 50 mg, Oral, DAILY    naproxen (NAPROSYN) 375 mg, Oral, 2 TIMES DAILY    NIFEdipine CC (ADALAT CC) 60 mg, Oral, DAILY    OneTouch Delica Lancets 30G Misc 1 Lancet, Does not apply, 3 TIMES DAILY, One touch brand    pantoprazole (PROTONIX) 20 mg, Oral, DAILY    Semaglutide 3 mg, Oral, DAILY BEFORE BREAKFAST       Allergies:   ALLERGIES:  No Known Allergies    Social History:     Social History     Tobacco Use    Smoking status: Never    Smokeless tobacco: Never   Vaping Use    Vaping status: never used   Substance Use Topics    Alcohol use: Never    Drug use: Never     Occupation: Retired  Home Support: Lives alone, does have a caretaker    Physical Exam:       Visit Vitals  BP (!) 168/71   Pulse 70   Temp 97 °F (36.1 °C)   Resp 18   Ht 5' 1\" (1.549 m)   Wt 108.8 kg (239 lb 13.8 oz)   SpO2 96%   BMI 45.32 kg/m²        Gen: NAD, well developed, well nourished  Psych: Normal affect and mood, Aox3, conversant  CV: Well perfused, pulses intact and regular  Resp: comfortable on RA, no audible abnormalities  Abd: soft, nontender, nondistended    Musculoskeletal:    RUE: Arm in sling, tender to palpation about the shoulder with pain with any range of motion of the shoulder.          +TU/OK/x23/Opp          SILT FDWS/VIF/VSF/lat shoulder          2+ radial, CR  brisk    Relevant Labs     HGB (g/dL)   Date Value   01/28/2025 13.3     PLT (K/mcL)   Date Value   01/28/2025 365     INR (no units)   Date Value   01/27/2025 1.1       Sodium (mmol/L)   Date Value   01/28/2025 136     Potassium (mmol/L)   Date Value   01/28/2025 4.4     Creatinine (mg/dL)   Date Value   01/28/2025 1.06 (H)        WBC:   WBC (K/mcL)   Date Value   01/28/2025 17.0 (H)     Imaging:   Imaging studies personally interpreted by me:    Radiographs of the right shoulder demonstrate a anteroinferior glenohumeral dislocation with associated greater tuberosity fracture.  CT imaging of the shoulder shows some comminution of the tuberosity fragment and impaction and incarceration of the humeral head on the anteroinferior glenoid.    Assessment and Plan:   Kia Wilder is a 64 year old female patient who sustained the below injuries after a ground-level fall on 1/27/2025    Assessment:  1).  Right glenohumeral joint fracture dislocation with fracture involvement of the greater tuberosity    Operative and nonoperative treatment options are present and were discussed at length with the patient.  The fracture displacement, persistent dislocation unable to be closed reduced, and instability indicate the patient for operative treatment.  The risks of operative intervention, including, but not limited to, anesthetic risk, infection, bleeding, nerve injury, deep vein thrombosis, pulmonary embolus, paralysis, stroke, and heart attack were discussed, the patient shows understanding of the risks.  The benefits of stability, earlier mobility and return to functional activities were also discussed, patient shows understanding of the benefits. The patient wishes to proceed with operative intervention.      Plan:  - Proceed to the OR for right glenohumeral joint open reduction, right proximal humerus/greater tuberosity open reduction and internal fixation  - WB: Nonweightbearing right upper extremity pending OR  - Diet:  N.p.o. pending OR  - Abx: Ancef on-call to OR  - Pain control: As needed  - DVT ppx: Hold pending OR    - appreciate medical clearance by admitting team    Aamdeo Caballero MD  Orthopedic Trauma     CM met with patient at bedside.  Patient. A&O x 4. Pt s/p  PROCEDURES: Total lanterior lt hip replacement. Pt  resting comfortably / Pt has no complaints at this time.  CM explained role of CM and availability to assist with discharge planning throughout stay.   Provided CM contact information and pt. verbalized understanding. Patient lives in a private house alone with 5+13+13 steps to navigate. Patient has a ling term policy with [facility] and they will private HHA 12 hr x7 days a week during recovery and transport to [facility]. Patient identified her significant other Delonte Blackmon [phone] as a source of support  for her and will transport her home. Pt. is agreeable with PT/OT's recommendations for d/c plan to home with HC/PT.  CM explained home care expectations, process, insurance provisions and home safety with good understanding.   Offered list of [facility] and pt. chose [facility] at home care agency.  Provided discharge resources folder. CM made referral accordingly.  Informed them of Anticipated  DC date for 3/16/2023 and for SOC 3/17/2023.  Patient provided with info on the transitional care management/health solutions team who will be following her upon DC.  Pt verbalizing understanding and in agreement with d/c plans.   DME: Pt has  2 rolling walker, 2  commodes, crutches and shower chair  at home.  PCP: Dr Martínez Singletary [phone]  Pt stated she will be receiving meds to bed from [facility] pharmacy prior to DC.

## 2025-02-07 ENCOUNTER — APPOINTMENT (OUTPATIENT)
Dept: PULMONOLOGY | Facility: CLINIC | Age: 75
End: 2025-02-07
Payer: MEDICARE

## 2025-02-07 VITALS
HEIGHT: 68 IN | RESPIRATION RATE: 16 BRPM | OXYGEN SATURATION: 96 % | SYSTOLIC BLOOD PRESSURE: 130 MMHG | DIASTOLIC BLOOD PRESSURE: 80 MMHG | BODY MASS INDEX: 23.79 KG/M2 | WEIGHT: 157 LBS | HEART RATE: 70 BPM | TEMPERATURE: 97.7 F

## 2025-02-07 DIAGNOSIS — G47.33 OBSTRUCTIVE SLEEP APNEA (ADULT) (PEDIATRIC): ICD-10-CM

## 2025-02-07 DIAGNOSIS — Z82.49 FAMILY HISTORY OF ISCHEMIC HEART DISEASE AND OTHER DISEASES OF THE CIRCULATORY SYSTEM: ICD-10-CM

## 2025-02-07 DIAGNOSIS — Z86.2 PERSONAL HISTORY OF DISEASES OF THE BLOOD AND BLOOD-FORMING ORGANS AND CERTAIN DISORDERS INVOLVING THE IMMUNE MECHANISM: ICD-10-CM

## 2025-02-07 DIAGNOSIS — R94.2 ABNORMAL RESULTS OF PULMONARY FUNCTION STUDIES: ICD-10-CM

## 2025-02-07 DIAGNOSIS — Z87.39 PERSONAL HISTORY OF OTHER DISEASES OF THE MUSCULOSKELETAL SYSTEM AND CONNECTIVE TISSUE: ICD-10-CM

## 2025-02-07 DIAGNOSIS — Z80.52 FAMILY HISTORY OF MALIGNANT NEOPLASM OF BLADDER: ICD-10-CM

## 2025-02-07 DIAGNOSIS — Z63.4 DISAPPEARANCE AND DEATH OF FAMILY MEMBER: ICD-10-CM

## 2025-02-07 DIAGNOSIS — G43.109 MIGRAINE WITH AURA, NOT INTRACTABLE, W/OUT STATUS MIGRAINOSUS: ICD-10-CM

## 2025-02-07 DIAGNOSIS — R53.82 CHRONIC FATIGUE, UNSPECIFIED: ICD-10-CM

## 2025-02-07 DIAGNOSIS — K21.9 GASTRO-ESOPHAGEAL REFLUX DISEASE W/OUT ESOPHAGITIS: ICD-10-CM

## 2025-02-07 DIAGNOSIS — M35.00 SICCA SYNDROME, UNSPECIFIED: ICD-10-CM

## 2025-02-07 PROCEDURE — ZZZZZ: CPT

## 2025-02-07 PROCEDURE — 95012 NITRIC OXIDE EXP GAS DETER: CPT

## 2025-02-07 PROCEDURE — 94060 EVALUATION OF WHEEZING: CPT

## 2025-02-07 PROCEDURE — 94727 GAS DIL/WSHOT DETER LNG VOL: CPT

## 2025-02-07 PROCEDURE — 94729 DIFFUSING CAPACITY: CPT

## 2025-02-07 PROCEDURE — 99204 OFFICE O/P NEW MOD 45 MIN: CPT | Mod: 25

## 2025-02-07 PROCEDURE — 94618 PULMONARY STRESS TESTING: CPT

## 2025-02-07 SDOH — SOCIAL STABILITY - SOCIAL INSECURITY: DISSAPEARANCE AND DEATH OF FAMILY MEMBER: Z63.4

## 2025-02-10 ENCOUNTER — TRANSCRIPTION ENCOUNTER (OUTPATIENT)
Age: 75
End: 2025-02-10

## 2025-02-11 ENCOUNTER — OUTPATIENT (OUTPATIENT)
Dept: OUTPATIENT SERVICES | Facility: HOSPITAL | Age: 75
LOS: 1 days | End: 2025-02-11
Payer: MEDICARE

## 2025-02-11 ENCOUNTER — APPOINTMENT (OUTPATIENT)
Dept: CT IMAGING | Facility: CLINIC | Age: 75
End: 2025-02-11
Payer: MEDICARE

## 2025-02-11 DIAGNOSIS — R76.8 OTHER SPECIFIED ABNORMAL IMMUNOLOGICAL FINDINGS IN SERUM: ICD-10-CM

## 2025-02-11 DIAGNOSIS — Z96.651 PRESENCE OF RIGHT ARTIFICIAL KNEE JOINT: Chronic | ICD-10-CM

## 2025-02-11 DIAGNOSIS — Z98.890 OTHER SPECIFIED POSTPROCEDURAL STATES: Chronic | ICD-10-CM

## 2025-02-11 DIAGNOSIS — Z98.89 OTHER SPECIFIED POSTPROCEDURAL STATES: Chronic | ICD-10-CM

## 2025-02-11 DIAGNOSIS — Z96.652 PRESENCE OF LEFT ARTIFICIAL KNEE JOINT: Chronic | ICD-10-CM

## 2025-02-11 DIAGNOSIS — Z96.641 PRESENCE OF RIGHT ARTIFICIAL HIP JOINT: Chronic | ICD-10-CM

## 2025-02-11 PROCEDURE — 71250 CT THORAX DX C-: CPT

## 2025-02-11 PROCEDURE — 71250 CT THORAX DX C-: CPT | Mod: 26

## 2025-02-12 ENCOUNTER — NON-APPOINTMENT (OUTPATIENT)
Age: 75
End: 2025-02-12

## 2025-02-14 ENCOUNTER — TRANSCRIPTION ENCOUNTER (OUTPATIENT)
Age: 75
End: 2025-02-14

## 2025-02-25 DIAGNOSIS — R93.89 ABNORMAL FINDINGS ON DIAGNOSTIC IMAGING OF OTHER SPECIFIED BODY STRUCTURES: ICD-10-CM

## 2025-03-21 ENCOUNTER — APPOINTMENT (OUTPATIENT)
Dept: PULMONOLOGY | Facility: CLINIC | Age: 75
End: 2025-03-21
Payer: MEDICARE

## 2025-03-21 PROCEDURE — 99213 OFFICE O/P EST LOW 20 MIN: CPT | Mod: 93

## 2025-04-04 ENCOUNTER — APPOINTMENT (OUTPATIENT)
Dept: RHEUMATOLOGY | Facility: CLINIC | Age: 75
End: 2025-04-04

## 2025-05-20 ENCOUNTER — APPOINTMENT (OUTPATIENT)
Dept: PULMONOLOGY | Facility: CLINIC | Age: 75
End: 2025-05-20

## 2025-06-04 NOTE — PHYSICAL THERAPY INITIAL EVALUATION ADULT - PHYSICAL ASSIST/NONPHYSICAL ASSIST: GAIT, REHAB EVAL
verbal cues/1 person + 1 person to manage equipment Patient/Caregiver provided printed discharge information.

## (undated) DEVICE — SUT VICRYL PLUS 1 27" CP UNDYED

## (undated) DEVICE — SAW BLADE STRYKER SAGITTAL AGGRESSIVE 25X86.5X1.32MM

## (undated) DEVICE — DRSG COBAN 6"

## (undated) DEVICE — SOL IRR BAG NS 0.9% 3000ML

## (undated) DEVICE — ORTHOALIGN KNEEALIGN TIBIAL AND FEMORAL

## (undated) DEVICE — ORTHOALIGN PLUS UNIT

## (undated) DEVICE — SUT MONOCRYL 3-0 18" PS-1

## (undated) DEVICE — WOUND IRR IRRISEPT W 0.5 CHG

## (undated) DEVICE — SYR LUER LOK 20CC

## (undated) DEVICE — SYR LUER LOK 50CC

## (undated) DEVICE — PACK TOTAL HIP

## (undated) DEVICE — DRAPE XL SHEET 77X98"

## (undated) DEVICE — DRSG COMBINE 5X9"

## (undated) DEVICE — ELCTR AQUAMANTYS BIPOLAR SEALER 6.0

## (undated) DEVICE — PACK TOTAL KNEE

## (undated) DEVICE — NDL SPINAL 18G X 3.5" (PINK)

## (undated) DEVICE — SYM-STRYKER SYSTEM 7: Type: DURABLE MEDICAL EQUIPMENT

## (undated) DEVICE — KIT OPTIVAC CEMENT MIXER 40GM

## (undated) DEVICE — POSITIONER POSITIONING ROLL  5X17"

## (undated) DEVICE — SUT VICRYL PLUS 2-0 27" CP-1 UNDYED

## (undated) DEVICE — WARMING BLANKET UPPER ADULT

## (undated) DEVICE — SOL IRR POUR H2O 1500ML

## (undated) DEVICE — HOOD FLYTE STRYKER HELMET SHIELD

## (undated) DEVICE — DRSG DERMABOND PRINEO 60CM

## (undated) DEVICE — SUT STRATAFIX SPIRAL MONOCRYL PLUS 4-0 14CM PS-2 UNDYED

## (undated) DEVICE — SOL IRR POUR NS 0.9% 500ML

## (undated) DEVICE — SOLIDIFIER CANN EXPRESS 3K

## (undated) DEVICE — SUT MONOSOF 3-0 30" C-16

## (undated) DEVICE — DRAPE BILATERAL LIMB 72" X 120"

## (undated) DEVICE — VENODYNE/SCD SLEEVE CALF MEDIUM

## (undated) DEVICE — ELCTR STRYKER NEPTUNE SMOKE EVACUATION PENCIL (GREEN)

## (undated) DEVICE — DRSG STOCKINETTE TUBULAR COTTON 2PLY 6X72"

## (undated) DEVICE — DRSG STOCKINETTE IMPERVIOUS XL

## (undated) DEVICE — DRAPE MAYO STAND 30"

## (undated) DEVICE — DRAPE IOBAN 10" X 8"

## (undated) DEVICE — GOWN XL W TOWEL

## (undated) DEVICE — DRSG XEROFORM 1 X 8"

## (undated) DEVICE — SPECIMEN CONTAINER PET

## (undated) DEVICE — DRAPE TOP SHEET 53" X 101"

## (undated) DEVICE — TOURNIQUET CUFF 34" DUAL PORT W PLC

## (undated) DEVICE — DRSG CURITY GAUZE SPONGE 4 X 4" 12-PLY

## (undated) DEVICE — ELCTR ROCKER SWITCH PENCIL BLUE 10FT

## (undated) DEVICE — DRAPE C ARM 41X140"

## (undated) DEVICE — HANDPIECE INTERPULSE W MULTI TIP